# Patient Record
Sex: FEMALE | Race: BLACK OR AFRICAN AMERICAN | NOT HISPANIC OR LATINO | Employment: PART TIME | URBAN - METROPOLITAN AREA
[De-identification: names, ages, dates, MRNs, and addresses within clinical notes are randomized per-mention and may not be internally consistent; named-entity substitution may affect disease eponyms.]

---
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
## 2023-02-11 ENCOUNTER — HEALTH MAINTENANCE LETTER (OUTPATIENT)
Age: 18
End: 2023-02-11

## 2023-08-11 ENCOUNTER — OFFICE VISIT (OUTPATIENT)
Dept: MIDWIFE SERVICES | Facility: CLINIC | Age: 18
End: 2023-08-11
Payer: COMMERCIAL

## 2023-08-11 VITALS
DIASTOLIC BLOOD PRESSURE: 74 MMHG | HEIGHT: 69 IN | BODY MASS INDEX: 27.95 KG/M2 | SYSTOLIC BLOOD PRESSURE: 117 MMHG | WEIGHT: 188.7 LBS | HEART RATE: 87 BPM | OXYGEN SATURATION: 100 % | TEMPERATURE: 97.9 F

## 2023-08-11 DIAGNOSIS — A54.9 GONORRHEA: Primary | ICD-10-CM

## 2023-08-11 DIAGNOSIS — B96.89 BACTERIAL VAGINOSIS: ICD-10-CM

## 2023-08-11 DIAGNOSIS — N89.8 VAGINAL DISCHARGE: ICD-10-CM

## 2023-08-11 DIAGNOSIS — Z11.3 ROUTINE SCREENING FOR STI (SEXUALLY TRANSMITTED INFECTION): ICD-10-CM

## 2023-08-11 DIAGNOSIS — N76.0 BACTERIAL VAGINOSIS: ICD-10-CM

## 2023-08-11 DIAGNOSIS — A59.9 TRICHIMONIASIS: ICD-10-CM

## 2023-08-11 LAB
CLUE CELLS: PRESENT
TRICHOMONAS, WET PREP: PRESENT
WBC'S/HIGH POWER FIELD, WET PREP: ABNORMAL
YEAST, WET PREP: ABNORMAL

## 2023-08-11 PROCEDURE — 87591 N.GONORRHOEAE DNA AMP PROB: CPT | Performed by: ADVANCED PRACTICE MIDWIFE

## 2023-08-11 PROCEDURE — 99204 OFFICE O/P NEW MOD 45 MIN: CPT | Performed by: ADVANCED PRACTICE MIDWIFE

## 2023-08-11 PROCEDURE — 87210 SMEAR WET MOUNT SALINE/INK: CPT | Performed by: ADVANCED PRACTICE MIDWIFE

## 2023-08-11 PROCEDURE — 87491 CHLMYD TRACH DNA AMP PROBE: CPT | Performed by: ADVANCED PRACTICE MIDWIFE

## 2023-08-11 NOTE — PROGRESS NOTES
"SUBJECTIVE:  Berta ia a 18 year old  who presents to clinic today for STI testing. She has recently started to have vaginal symptoms such as extra discharge, feeling that her PH is off, and a different smell. She has had similar symptoms in the past, and had chlamydia with those symptoms. She is requesting vaginal swab STI testing. She has sex infrequently with a male partner. This partner is also having sex with others.     OBJECTIVE:  /74 (BP Location: Left arm, Patient Position: Sitting, Cuff Size: Adult Regular)   Pulse 87   Temp 97.9  F (36.6  C) (Temporal)   Ht 1.753 m (5' 9\")   Wt 85.6 kg (188 lb 11.2 oz)   LMP 2023 (Exact Date)   SpO2 100%   Breastfeeding No   BMI 27.87 kg/m    Not otherwise examined today.    ASSESSMENT/PLAN:  (A54.9) Gonorrhea  (primary encounter diagnosis)  Plan: cefTRIAXone (ROCEPHIN) in lidocaine 1% (PF) for        IM administration only 500 mg    (Z11.3) Routine screening for STI (sexually transmitted infection)  Plan: NEISSERIA GONORRHOEA PCR, CHLAMYDIA TRACHOMATIS        PCR, Wet prep - Clinic Collect    (N89.8) Vaginal discharge  Plan: NEISSERIA GONORRHOEA PCR, CHLAMYDIA TRACHOMATIS        PCR, Wet prep - Clinic Collect    (N76.0,  B96.89) Bacterial vaginosis  Plan: metroNIDAZOLE (FLAGYL) 500 MG tablet  (A59.9) Trichimoniasis  Plan: metroNIDAZOLE (FLAGYL) 500 MG tablet        Discussed recommendations for full panel STI testing including blood testing due to her history of an STI in the past as well as having sex with a high risk individual. Berta declines blood testing at this time.     Yara ZULETA    I was present with the RODM student who participated in the service and in the documentation of the services provided. I have verified the history and personally performed the physical exam and medical decision making, as documented by the student and edited by me.     Evonne Fox, CAROLA SCHILLING    "

## 2023-08-12 LAB
C TRACH DNA SPEC QL NAA+PROBE: NEGATIVE
N GONORRHOEA DNA SPEC QL NAA+PROBE: POSITIVE

## 2023-08-14 RX ORDER — CEFTRIAXONE SODIUM 1 G
500 VIAL (EA) INJECTION ONCE
Status: COMPLETED | OUTPATIENT
Start: 2023-08-14 | End: 2023-08-15

## 2023-08-14 RX ORDER — METRONIDAZOLE 500 MG/1
500 TABLET ORAL 2 TIMES DAILY
Qty: 14 TABLET | Refills: 0 | Status: SHIPPED | OUTPATIENT
Start: 2023-08-14 | End: 2023-08-21

## 2023-08-15 ENCOUNTER — ALLIED HEALTH/NURSE VISIT (OUTPATIENT)
Dept: NURSING | Facility: CLINIC | Age: 18
End: 2023-08-15
Payer: COMMERCIAL

## 2023-08-15 DIAGNOSIS — A54.9 GONORRHEA: Primary | ICD-10-CM

## 2023-08-15 PROCEDURE — 99207 PR NO CHARGE NURSE ONLY: CPT

## 2023-08-15 PROCEDURE — 96372 THER/PROPH/DIAG INJ SC/IM: CPT | Performed by: ADVANCED PRACTICE MIDWIFE

## 2023-08-15 RX ADMIN — Medication 500 MG: at 12:27

## 2023-08-24 DIAGNOSIS — L25.9 CONTACT DERMATITIS, UNSPECIFIED CONTACT DERMATITIS TYPE, UNSPECIFIED TRIGGER: ICD-10-CM

## 2023-08-24 RX ORDER — TRIAMCINOLONE ACETONIDE 1 MG/G
CREAM TOPICAL 2 TIMES DAILY
Qty: 30 G | Refills: 0 | Status: SHIPPED | OUTPATIENT
Start: 2023-08-24

## 2023-09-13 ENCOUNTER — VIRTUAL VISIT (OUTPATIENT)
Dept: PSYCHOLOGY | Facility: CLINIC | Age: 18
End: 2023-09-13
Payer: COMMERCIAL

## 2023-09-13 DIAGNOSIS — F43.23 ADJUSTMENT DISORDER WITH MIXED ANXIETY AND DEPRESSED MOOD: Primary | ICD-10-CM

## 2023-09-13 PROCEDURE — 90791 PSYCH DIAGNOSTIC EVALUATION: CPT | Mod: 95 | Performed by: SOCIAL WORKER

## 2023-09-13 ASSESSMENT — PATIENT HEALTH QUESTIONNAIRE - PHQ9
SUM OF ALL RESPONSES TO PHQ QUESTIONS 1-9: 12
10. IF YOU CHECKED OFF ANY PROBLEMS, HOW DIFFICULT HAVE THESE PROBLEMS MADE IT FOR YOU TO DO YOUR WORK, TAKE CARE OF THINGS AT HOME, OR GET ALONG WITH OTHER PEOPLE: SOMEWHAT DIFFICULT
SUM OF ALL RESPONSES TO PHQ QUESTIONS 1-9: 12

## 2023-09-13 ASSESSMENT — ANXIETY QUESTIONNAIRES
GAD7 TOTAL SCORE: 16
4. TROUBLE RELAXING: NEARLY EVERY DAY
GAD7 TOTAL SCORE: 16
2. NOT BEING ABLE TO STOP OR CONTROL WORRYING: NOT AT ALL
IF YOU CHECKED OFF ANY PROBLEMS ON THIS QUESTIONNAIRE, HOW DIFFICULT HAVE THESE PROBLEMS MADE IT FOR YOU TO DO YOUR WORK, TAKE CARE OF THINGS AT HOME, OR GET ALONG WITH OTHER PEOPLE: SOMEWHAT DIFFICULT
5. BEING SO RESTLESS THAT IT IS HARD TO SIT STILL: NEARLY EVERY DAY
1. FEELING NERVOUS, ANXIOUS, OR ON EDGE: SEVERAL DAYS
7. FEELING AFRAID AS IF SOMETHING AWFUL MIGHT HAPPEN: NEARLY EVERY DAY
6. BECOMING EASILY ANNOYED OR IRRITABLE: NEARLY EVERY DAY
3. WORRYING TOO MUCH ABOUT DIFFERENT THINGS: NEARLY EVERY DAY

## 2023-09-13 ASSESSMENT — COLUMBIA-SUICIDE SEVERITY RATING SCALE - C-SSRS
2. HAVE YOU ACTUALLY HAD ANY THOUGHTS OF KILLING YOURSELF?: NO
ATTEMPT LIFETIME: NO
1. HAVE YOU WISHED YOU WERE DEAD OR WISHED YOU COULD GO TO SLEEP AND NOT WAKE UP?: NO
6. HAVE YOU EVER DONE ANYTHING, STARTED TO DO ANYTHING, OR PREPARED TO DO ANYTHING TO END YOUR LIFE?: NO
TOTAL  NUMBER OF ABORTED OR SELF INTERRUPTED ATTEMPTS LIFETIME: NO
TOTAL  NUMBER OF INTERRUPTED ATTEMPTS LIFETIME: NO
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
## 2023-09-13 NOTE — PATIENT INSTRUCTIONS
Dear Berta,     It was nice meeting you today. Thank you for your willingness to speak with me. Today we discussed starting therapy. Before your next visit, please think about what you want to change in your life through therapy, what are your expectations for your therapy work, 2 - 3 goals you would like to accomplish in treatment, and how we will know when you've achieved them. If you struggle with this, think about this .... when you walk out of here for the last time and you have done what you came in here to do, what do you want to be different? What would you do more of or less of? What would you start doing or stop doing? What might you think differently about?    My therapy sessions are generally 45 minutes long. Please call [phone] to make, change or cancel appointments. Please allow 24 hours notice if you need to cancel or change your visit.     I am available for in-person care on Wednesdays at [facility], [facility] [address], [address] [place] [address]. The office is adjacent to the [employer] shop and across from the [facility].     I am recommending the following:  Call to schedule an appointment in primary care to address breathing symptoms and establish with primary care provider.     Abstain from alcohol and all mood-altering substances. While it can have a temporary positive impact on our mood, it is a depressant and will likely lead to lower mood and anxiety. It also negatively impacts our sleep.    Individual therapy for added support, processing feelings, identifying and developing additional coping skills, and learning and implementing healthy ways of managing stress and emotions.     Daily self care. Be intentional about taking time to engage in an activity that feels good each day.     Sleep, nutrition, exercise, and social support. These areas are important for good mental health.     Crisis  Lines  Crisis Text Line  Text 600501  You will be connected with a trained live crisis counselor to provide support.    [employer] Hotline  5.025.523.6342 [hope]    línea de crisis española  689.346.5029    [facility] [employer] Helpline  711.795.8361    National Hope Line  3.072.773.1870 [hope]    National Suicide Prevention Lifeline  Free and confidential support  988 or 1.520.226.TALK [8255]  http://suicidepreventionlifeline.org    The [place] Project (LGBTQ Youth Crisis Line)  4.874.799.6912  text START to 550-538    's Crisis Line  0.600.471.5183 (Press 1)  or text 899285    [facility] Mental Health Crisis Response  Within [place], call **CRISIS [**899582] to be connected to a mental health professional who can assist you.    [facility] Crisis  [phone]  [facility] Mobile Crisis  [phone]  [facility] Crisis  [phone]  [facility] Mobile Crisis  [phone] (adults)  [phone] (children)  [facility] Mobile Crisis  [phone] (adults)  [phone] (children)  [facility] Mobile Crisis  816.156.8278  [facility] Mobile Crisis  008.906.5055    Community Resources  Fast Tracker  Linking people to mental health and substance use disorder resources  fasttrackermn.org     [place] Mental Health Warmline  Peer to peer support  5 pm to 9 am 7 days/week  5.513.314.9708  https://mnwitw.org/samuel    National [place] on Mental Illness (DION)  872.966.9654 or 1.888.DION.HELPS  https://namimn.org/    [facility]  [facility] residents [address]  [phone]    [facility]  Free mental health counseling  https://walkin.org/  612.870.0565 X2    Mental Health Apps  Calm Harm  https://calmharm.co.uk/  My3  https://my3app.org/  [place] Safety Plan  https://www.[place]afetyplan.org/  VirtualHopeBox

## 2023-09-19 ENCOUNTER — VIRTUAL VISIT (OUTPATIENT)
Dept: PSYCHOLOGY | Facility: CLINIC | Age: 18
End: 2023-09-19
Payer: COMMERCIAL

## 2023-09-19 DIAGNOSIS — F43.23 ADJUSTMENT DISORDER WITH MIXED ANXIETY AND DEPRESSED MOOD: Primary | ICD-10-CM

## 2023-09-19 PROCEDURE — 90834 PSYTX W PT 45 MINUTES: CPT | Mod: VID | Performed by: SOCIAL WORKER

## 2023-09-19 NOTE — PROGRESS NOTES
"  [facility]                                   Progress Note    Patient Name: Berta Oseguera  Date: 9/19/2023        Service Type: Individual  Attendees:  Patient attended alone      Session Start Time: 0720  Session End Time: 0810     Session Length: 38-52 min  Session #: 1    Service Modality:  Phone Visit:      Provider verified identity through the following two step process.  Patient provided:  Patient is known previously to provider and Patient was verified at admission/transfer    Telephone Visit: The patient's condition can be safely assessed and treated via synchronous audio telemedicine encounter.      Reason for Audio Telemedicine Visit: Patient has requested telehealth visit and Patient convenience (e.g. access to timely appointments / distance to available provider)    Originating Site (Patient Location): Patient's home    Distant Site (Provider Location): Provider Remote Setting- Home Office    Consent:  The patient/guardian has verbally consented to:     1. The potential risks and benefits of telemedicine (telephone visit) versus in person care;    The patient has been notified of the following:      \"We have found that certain health care needs can be provided without the need for a face to face visit.  This service lets us provide the care you need with a phone conversation.       I will have full access to your [facility] medical record during this entire phone call.   I will be taking notes for your medical record.      Since this is like an office visit, we will bill your insurance company for this service.       There are potential benefits and risks of telephone visits (e.g. limits to patient confidentiality) that differ from in-person visits.?Confidentiality still applies for telephone services, and nobody will record the visit.  It is important to be in a quiet, private space that is free of distractions (including cell phone or other devices) during the visit.??    " "  If during the course of the call I believe a telephone visit is not appropriate, you will not be charged for this service\"     Consent has been obtained for this service by care team member: Yes     DATA  Interactive Complexity: No  Crisis: No      Progress Since Last Session (related to symptoms/goals/homework):   Symptoms: No change - denies significant change to sx    Homework: Completed in session     Episode of Care Goals: Satisfactory progress - PREPARATION (Decided to change - considering how); Intervened by negotiating a change plan and determining options / strategies for behavior change, identifying triggers, exploring social supports, and working towards setting a date to begin behavior change    Current/Ongoing Stressors and Concerns: independent, social, strong family support, financial stress, trauma hx, recently quit marijuana (9/2023)     Treatment Objective(s) Addressed in This Session: Rapport building, treatment planning    Intervention: Did not arrive to scheduled appointment. Called patient who had overslept and was amenable to meeting by phone. There were a few instances where she was distracted, as she was moving her parked vehicle to the garage and had issues unlocking a door. Presents with euthymic mood, anxious affect; open, engaged and responsive to interventions. States that she continues to abstain from marijuana, though continues vaping, which she states she doesn't believe is healthy for her. Denies significant change to symptoms or stressors. Identifies preference to address treatment planning, then to address interpersonal conflict and stress. Engaged in treatment planning. Identifies the treatment goals related to depression, anxiety and self-esteem. Completed the treatment plan. Expresses interest in meeting twice weekly, though amenable to once/week to start. Worked on strengthening the relationship through the implementation of collaborative approach, agreement on goals, " demonstration of empathy, verbalization of positive regard, and collection of feedback.      Assessments completed prior to visit:  PHQ9:       6/21/2022    10:05 PM 9/13/2023    10:54 AM   PHQ-9 SCORE   PHQ-9 Total Score MyChart 19 (Moderately severe depression) 12 (Moderate depression)   PHQ-9 Total Score 19 12     GAD7:       9/13/2023    10:56 AM   YG-7 SCORE   Total Score 16 (severe anxiety)   Total Score 16       ASSESSMENT: Current Emotional/Mental Status (status of significant symptoms):   Risk status (Self/Other harm or suicidal ideation)   Patient denies current fears or concerns for personal safety.   Patient denies current or recent suicidal ideation or behaviors.   Patient denies current or recent homicidal ideation or behaviors.   Patient denies current or recent self injurious behavior or ideation.   Patient denies other safety concerns.   Patient reports there has been no change in risk factors since their last session.     Patient reports there has been no change in protective factors since their last session.     Recommended that patient call 911 or go to the local ED should there be a change in any of these risk factors.     Appearance:   Unable to assess    Eye Contact:   Unable to assess    Psychomotor Behavior: Unable to assess    Attitude:   Cooperative  Pleasant   Orientation:   All   Speech    Rate / Production: Normal/ Responsive    Volume:  Soft    Mood:    Anxious  Normal Euthymic   Affect:    Appropriate    Thought Content:  Clear    Thought Form:  Coherent  Logical    Insight:    Fair      Medication Review: No current psychiatric medications prescribed     Medication Compliance: NA     Changes in Health Issues: None reported     Chemical Use Review:   Substance Use: Chemical use reviewed, no active concerns identified    Quit marijuana September 2023; hx daily use.     Tobacco Use: No change in amount of tobacco use since last session.  Patient declined discussion at this  "time    Diagnosis:  1. Adjustment disorder with mixed anxiety and depressed mood      Collateral Reports Completed: Not Applicable    PLAN: Personal goals for 2023    Matters to address at future session(s): How to handle situations with people trying to talk to you.     Next appt(s): 9/27, 10/2, 10/9  Prefers Mondays/Wednesdays ([facility]).    Waitlist: 9/20, 9/25 April ALMAS Mendenhall, LICSW  9/19/2023                                                   ______________________________________________________________________    Individual Treatment Plan    Patient's Name: Berta Oseguera  YOB: 2005    Date of Creation: 9/19/2023   Date Treatment Plan Last Reviewed/Revised: 9/19/2023; will next review 12/18/23     DSM5 Diagnoses: Adjustment Disorders  309.28 (F43.23) With mixed anxiety and depressed mood  Psychosocial/Contextual Factors: independent, social, strong family support, financial stress, trauma hx, recently quit marijuana (9/2023)  PROMIS (reviewed every 90 days): PROMIS-10 Scores      9/13/2023    11:11 AM   PROMIS-10 Total Score w/o Sub Scores   PROMIS TOTAL - SUBSCORES 20      Referral/Collaboration: Referral to another professional/service is not indicated at this time.    Anticipated number of session for this episode of care: 9-12 sessions  Anticipation frequency of session: Weekly  Anticipated Duration of each session: 38-52 minutes  Treatment plan will be reviewed in 90 days or when goals have been changed.     Measurable Treatment Goal(s) related to diagnosis/functional impairment(s)    \"To gain self-love and self-confidence again, talk about a lot of stuff that's been bothering me, to build a bonding relationship with you [therapist].\"     Goal 1: Patient will experience decrease in depressive symptoms as evidenced by PHQ-9 scores.     I will know I've met my goal when I have better conversations about my life and what I'm doing, and am less sad and irritable.     Objective " A    Patient will verbalize an accurate understanding of depression.  Status: New - Date: 9/19/23    Intervention(s): Therapist will, consistent with the treatment model, discuss how cognitive, behavioral, interpersonal, and/or other factors (e.g. family history) contribute to depression.     Objective B  Patient will verbalize an understanding of the rationale for treatment of depression.  Status: New - Date: 9/19/23    Intervention(s): Therapist will, consistent with the treatment model, discuss how change in cognitive, behavioral, interpersonal, and/or other factors can help alleviate depression and return to previous level of effective functioning.     Objective C    Patient will identify and replace thoughts and beliefs that support depression.  Status: New - Date: 9/19/23    Intervention(s): Therapist will conduct cognitive-behavioral therapy, first conveying the connection among cognition, depressive feelings, and actions. Assign the patient to self-monitor thoughts, feelings, and actions in a journal; process the journal material to identify, challenge, and change depressive thinking patterns and replace them with reality-based thoughts. Identify, challenge, and change depressive thinking patterns and replace them with reality-based thoughts. Assign  behavioral experiments  in and outside of sessions that test depressive automatic thoughts and beliefs against more reality-based ones toward a sustained shift reflecting hopefulness, motivation, confidence, and an improved self-concept. Facilitate and reinforce the patient's shift from biased depressive self-talk and beliefs to reality-based alternatives that enhance emotion regulation and increase adaptive functioning. Explore and restructure underlying assumptions and schema reflected in biased self-talk that may place the patient at risk for relapse or recurrence; help build the patient's self-concept from unlovable, worthless, helpless, or incompetent to  empowering alternatives.    Objective D  Patient will learn and implement behavioral strategies to overcome depression.  Status: New - Date: 9/19/23     Intervention(s): Therapist will engage the patient in  behavioral activation,  increasing his/her/their activity level and contact with sources of reward, while identifying processes that inhibit or obstruct activation; use instruction, rehearsal, role-playing, role reversal, as needed, to facilitate activity in the patient's daily life; reinforce success, problem-solve obstacles. Assist the patient in developing skills that increase the likelihood of deriving pleasure and meaning from behavioral activation (e.g., assertiveness skills, developing an exercise plan, less internal/more external focus, increased social involvement); reinforce success; problem-solve obstacles toward sustained, rewarding activation.    Objective E  Patient will increasingly verbalize hopeful and positive statements regarding self, others, and the future.  Status: New - Date: 9/19/23    Intervention(s): Therapist will teach more about depression and how to recognize and accept some sadness as a normal variation in feeling. Assign the patient to write positive affirmation statements regarding themselves and the future.      Objective F  Patient will learn and implement problem-solving and decision-making skills.                     Status: New - Date: 9/19/23     Intervention(s): Therapist will conduct problem-solving therapy using techniques such as psychoeducation, modeling, and role-playing to teach patient problem-solving skills (i.e., defining a problem specifically, generating possible solutions, evaluating the pros and cons of each solution, selecting and implementing a plan of action, evaluating the efficacy of the plan, accepting or revising the plan); accepting or revising the plan); role-play application of the problem-solving skill to a real life issue. Encourage the development  of a positive problem orientation in which problems and solving them are viewed as a natural part of life and not something to be feared, despaired, or avoided; reinforce successes toward sustained, effective use.    Objective G    Patient will verbalize an understanding of healthy and unhealthy emotions with the intent of increasing the use of healthy emotions to guide actions.  Status: New - Date: 9/19/23    Intervention(s): Therapist will use a process-experiential approach consistent with emotion-focused therapy to create a safe, nurturing environment in which the patient can process emotions, learning to identify and regulate unhealthy feelings and to generate more adaptive ones that then guide actions.     Goal 2: Patient will experience decrease in anxiety symptoms as evidenced by YG-7 scores.    I will know I've met my goal when I am spending more time doing things just for myself, like being outside or going for walks, and doing things, not just sitting with friends looking at social media.      Objective A    Patient will verbalize an understanding of the cognitive, physiological, and behavioral components of anxiety and its treatment.  Status: New - Date: 9/19/23    Intervention(s): Therapist will discuss how anxiety typically involves excessive worry about unrealistically appraised threats, various bodily expressions of overarousal, hypervigilance, and avoidance of what is threatening that interact to maintain the problem. Discuss how treatment targets worry, anxiety symptoms, and avoidance to help the patient manage worry effectively, reduce overarousal, eliminate unnecessary avoidance, and reengage in rewarding activities.    Objective B  Patient will verbalize an understanding of the role that thinking plays in worry, anxiety, and avoidance.  Status: New - Date: 9/19/23    Intervention(s): Therapist will discuss examples demonstrating how unproductive worry typically overestimates the probability of  threats and underestimates or overlooks the patient's ability to manage realistic demands. Assist the patient in analyzing worries by examining potential biases such as the probability of the negative expectation occurring, the real consequences of it occurring, ability to control the outcome, the worst possible outcome, and ability to accept it. Help the patient gain insight into the notion that worry creates acute and/or chronic anxious apprehension, leading to avoidance that precludes finding solutions to problems.    Objective C  Patient will identify, challenge, and replace biased, fearful self-talk with positive, realistic, and empowering self-talk.  Status: New - Date: 9/19/23    Intervention(s): Therapist will utilize techniques from cognitive behavioral therapies including intolerance of uncertainty and metacognitive therapies explore the patient's self-talk, underlying assumptions, schema, or metacognition that mediate anxiety; assist the patient in challenging and changing biases; conduct behavioral experiments to test biased versus unbiased predictions toward dispelling unproductive worry and increasing self-confidence in addressing the subject of worry. Assign homework exercises to identify fearful self-talk, identify biases in the self-talk, generate alternatives, and test them through behavioral experiments; review and reinforce success, providing corrective feedback toward improvement.    Objective D  Patient will learn and implement calming skills to reduce overall anxiety and manage anxiety.  Status: New - Date: 9/19/23    Intervention(s): Therapist will teach calming/relaxation/mindfulness skills (e.g., applied relaxation, progressive muscle relaxation, cue controlled relaxation; mindful breathing; biofeedback) and how to discriminate better between relaxation and tension; teach the patient how to apply these skills to daily life. Assign homework in which he/she/they practice  calming/relaxation/mindfulness skills, gradually applying them progressively from non-anxiety-provoking to anxiety-provoking situations; review and reinforce success; resolve obstacles toward sustained implementation.    Objective E  Patient will learn and implement problem-solving strategies for realistically addressing worries.  Status: New - Date: 9/19/23    Intervention(s): Therapist will teach problem-solving/solution-finding strategies to replace unproductive worry involving specifically defining a problem, generating options for addressing it, evaluating the pros and cons of each option, selecting and implementing an action plan, and reevaluating and refining the action.    Goal 3: Patient will establish an inward sense of self-worth, confidence, and competence.     I will know I've met my goal when I am wearing something other than sweats outside of work.       Objective A  Patient will increase insight into the historical and current sources of low self-esteem.  Status: New - Date: 9/19/23    Intervention(s): Therapist will help patient become aware of fear of rejection and its connection with past rejection or abandonment experiences; begin to contrast past experiences of pain with present experiences of acceptance and competence. Discuss, emphasize, and interpret the patient's incidents of abuse and how they have affected feelings about self.    Objective B  Patient will decrease the verbalized fear of rejection while increasing statements of self-acceptance.  Status: New - Date: 9/19/23    Intervention(s): Therapist will assign the patient to write positive affirmation statements regarding themselves and the future. Verbally reinforce the patient s use of positive statements of confidence and accomplishments.    Objective C  Patient will identify positive traits and talents about self.  Status: New - Date: 9/19/23    Intervention(s): Therapist will assign patient the exercise of identifying his/her/their  positive physical characteristics in a mirror to help him/her/them become more comfortable with himself/herself/themselves. Ask the patient to keep building a list of positive traits and have him/her/them read the list at the beginning and end of each session     Objective D  Patient will identify and replace negative self-talk messages used to reinforce low self-confidence.  Status: New - Date: 9/19/23     Intervention(s): Therapist will help the patient identify distorted, negative beliefs about self and the world and replace these messages with more realistic, affirmative messages. Ask the patient to complete and process self-esteem-building exercises from recommended self-help books (e.g., Ten Days to Self Esteem by Maeve; The Self-Esteem  by Haroon Haines [place], and Ayad; 10 Simple Solutions for Building Self-Esteem by Mary Jane).     Objective E  Patient will identify and engage in activities that would improve self-image by being consistent with one s values.  Status: New - Date: 9/19/23    Intervention(s): Therapist will help patient analyze his/her/their values and the congruence or incongruence between them and the patient s daily activities. Identify and assign activities congruent with the patient s values; process them toward improving self-concept and self-esteem.    Objective F  Patient will decrease the frequency of negative self-descriptive statements and increase frequency of positive self-descriptive statements.  Status: New - Date: 9/19/23    Intervention(s): Therapist will assist the patient in becoming aware of how he/she/they express or act out negative self-feelings. Help patient reframe his/her/their negative self-assessment. Assist in developing positive self-talk as a way of boosting confidence and self-image.    Objective G    Patient will demonstrate an increased ability to identify and express personal feelings.  Status: New - Date: 9/19/23    Intervention(s):  Therapist will assist patient in identifying and labeling emotions.    Objective H  Patient will articulate a plan to be proactive in trying to get identified needs met.  Status: New - Date: 9/19/23    Intervention(s): Therapist will assist the patient in identifying and verbalizing needs, met and unmet. Assist the patient in developing a specific action plan to get each need met.    Objective I    Patient will positively acknowledge verbal compliments from others.  Status: New - Date: 9/19/23    Intervention(s): Therapist will assign patient to be aware of and acknowledge graciously (without discounting) praise and compliments from others.    Objective J  Patient will take verbal responsibility for accomplishments without discounting.  Status: New - Date: 9/19/23    Intervention(s): Therapist will ask patient list accomplishments; process the integration of these into his/her/their self-image.    Patient has reviewed and agreed to the above plan.    Loree Mendenhall, [facility]  September 19, 2023

## 2023-09-27 ENCOUNTER — VIRTUAL VISIT (OUTPATIENT)
Dept: PSYCHOLOGY | Facility: CLINIC | Age: 18
End: 2023-09-27
Payer: COMMERCIAL

## 2023-09-27 DIAGNOSIS — F43.23 ADJUSTMENT DISORDER WITH MIXED ANXIETY AND DEPRESSED MOOD: Primary | ICD-10-CM

## 2023-09-27 PROCEDURE — 90834 PSYTX W PT 45 MINUTES: CPT | Mod: 95 | Performed by: SOCIAL WORKER

## 2023-09-27 ASSESSMENT — ANXIETY QUESTIONNAIRES
GAD7 TOTAL SCORE: 20
3. WORRYING TOO MUCH ABOUT DIFFERENT THINGS: NEARLY EVERY DAY
4. TROUBLE RELAXING: NEARLY EVERY DAY
GAD7 TOTAL SCORE: 20
6. BECOMING EASILY ANNOYED OR IRRITABLE: NEARLY EVERY DAY
2. NOT BEING ABLE TO STOP OR CONTROL WORRYING: NEARLY EVERY DAY
1. FEELING NERVOUS, ANXIOUS, OR ON EDGE: MORE THAN HALF THE DAYS
7. FEELING AFRAID AS IF SOMETHING AWFUL MIGHT HAPPEN: NEARLY EVERY DAY
IF YOU CHECKED OFF ANY PROBLEMS ON THIS QUESTIONNAIRE, HOW DIFFICULT HAVE THESE PROBLEMS MADE IT FOR YOU TO DO YOUR WORK, TAKE CARE OF THINGS AT HOME, OR GET ALONG WITH OTHER PEOPLE: EXTREMELY DIFFICULT
5. BEING SO RESTLESS THAT IT IS HARD TO SIT STILL: NEARLY EVERY DAY

## 2023-09-27 ASSESSMENT — PATIENT HEALTH QUESTIONNAIRE - PHQ9
SUM OF ALL RESPONSES TO PHQ QUESTIONS 1-9: 13
SUM OF ALL RESPONSES TO PHQ QUESTIONS 1-9: 13
10. IF YOU CHECKED OFF ANY PROBLEMS, HOW DIFFICULT HAVE THESE PROBLEMS MADE IT FOR YOU TO DO YOUR WORK, TAKE CARE OF THINGS AT HOME, OR GET ALONG WITH OTHER PEOPLE: VERY DIFFICULT

## 2023-09-27 NOTE — TELEPHONE ENCOUNTER
"Routing refill request to provider for review/approval because:  A break in medication    Last Written Prescription Date:  12/15/22  Last Fill Quantity: 30g ,  # refills: 0   Last office visit provider:  12/15/22    Requested Prescriptions   Pending Prescriptions Disp Refills    triamcinolone (KENALOG) 0.1 % external cream [Pharmacy Med Name: TRIAMCINOLONE 0.1% CREAM   30GM] 30 g 0     Sig: APPLY TOPICALLY TO THE AFFECTED AREA TWICE DAILY       Topical Steroids and Nonsteroidals Protocol Passed - 8/24/2023 12:16 PM        Passed - Patient is age 6 or older        Passed - Authorizing prescriber's most recent note related to this medication read.     If refill request is for ophthalmic use, please forward request to provider for approval.          Passed - High potency steroid not ordered        Passed - Recent (12 mo) or future (30 days) visit within the authorizing provider's specialty     Patient has had an office visit with the authorizing provider or a provider within the authorizing providers department within the previous 12 mos or has a future within next 30 days. See \"Patient Info\" tab in inbasket, or \"Choose Columns\" in Meds & Orders section of the refill encounter.              Passed - Medication is active on med list             Jannette Thorpe RN 08/24/23 3:09 PM  "
36.5
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
## 2023-10-02 ENCOUNTER — VIRTUAL VISIT (OUTPATIENT)
Dept: PSYCHOLOGY | Facility: CLINIC | Age: 18
End: 2023-10-02
Payer: COMMERCIAL

## 2023-10-02 DIAGNOSIS — F43.23 ADJUSTMENT DISORDER WITH MIXED ANXIETY AND DEPRESSED MOOD: Primary | ICD-10-CM

## 2023-10-02 PROCEDURE — 90837 PSYTX W PT 60 MINUTES: CPT | Mod: 95 | Performed by: SOCIAL WORKER
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
## 2023-10-06 ENCOUNTER — OFFICE VISIT (OUTPATIENT)
Dept: MIDWIFE SERVICES | Facility: CLINIC | Age: 18
End: 2023-10-06

## 2023-10-06 VITALS
DIASTOLIC BLOOD PRESSURE: 80 MMHG | BODY MASS INDEX: 28.05 KG/M2 | HEIGHT: 69 IN | WEIGHT: 189.4 LBS | SYSTOLIC BLOOD PRESSURE: 130 MMHG | TEMPERATURE: 97.8 F | HEART RATE: 80 BPM

## 2023-10-06 DIAGNOSIS — R30.0 DYSURIA: ICD-10-CM

## 2023-10-06 DIAGNOSIS — A59.01 TRICHOMONAL VAGINITIS: Primary | ICD-10-CM

## 2023-10-06 DIAGNOSIS — N89.8 VAGINAL ITCHING: ICD-10-CM

## 2023-10-06 DIAGNOSIS — N89.8 VAGINAL DISCHARGE: ICD-10-CM

## 2023-10-06 LAB
CLUE CELLS: ABNORMAL
TRICHOMONAS, WET PREP: PRESENT
WBC'S/HIGH POWER FIELD, WET PREP: ABNORMAL
YEAST, WET PREP: ABNORMAL

## 2023-10-06 PROCEDURE — 87591 N.GONORRHOEAE DNA AMP PROB: CPT | Performed by: ADVANCED PRACTICE MIDWIFE

## 2023-10-06 PROCEDURE — 99214 OFFICE O/P EST MOD 30 MIN: CPT | Performed by: ADVANCED PRACTICE MIDWIFE

## 2023-10-06 PROCEDURE — 87491 CHLMYD TRACH DNA AMP PROBE: CPT | Performed by: ADVANCED PRACTICE MIDWIFE

## 2023-10-06 PROCEDURE — 87210 SMEAR WET MOUNT SALINE/INK: CPT | Performed by: ADVANCED PRACTICE MIDWIFE

## 2023-10-06 RX ORDER — METRONIDAZOLE 500 MG/1
500 TABLET ORAL 2 TIMES DAILY
Qty: 14 TABLET | Refills: 0 | Status: SHIPPED | OUTPATIENT
Start: 2023-10-06 | End: 2023-10-13

## 2023-10-06 NOTE — RESULT ENCOUNTER NOTE
Dear Berta,    Your test results are attached below. You still have an infection with trichomonas bacteria. I prescribed you antibiotics to treat the infection to your [facility] pharmacy. You should take the full course of antibiotics and this should clear up your symptoms. If you would like you can return to the clinic a month after treatment in order to repeat the testing to make sure you cleared the infection. I can also prescribed expedited partner treatment- a course of antibiotics for your sexual partner if you wish.  If you have any questions, please contact me via [employer] or you can call our office at [phone].    Floridalma Black, SHAKIR, APRN SHAKIR, CNM

## 2023-10-06 NOTE — PROGRESS NOTES
"S: Berta presents to clinic with concerns of changes in vaginal discharge and would like to be re-tested. Appointment scheduled for UTI symptoms, she declines urinary symptoms but would still like to test urine today to confirm she does not have infection. Positive Trichomonas, BV, and GC on 8/11/23. She reports symptoms improved after tx. She is no longer with same partner, but did have intercourse with him after treatment. She reports continued yellow discharge with itching. She reports using OTC vaginal pH soap. Reviewed other cleansing methods including avoiding harsh soaps and cleansing with water only.   O: /80   Pulse 80   Temp 97.8  F (36.6  C)   Ht 1.753 m (5' 9\")   Wt 85.9 kg (189 lb 6.4 oz)   LMP 10/01/2023 (Exact Date)   BMI 27.97 kg/m    A/P:   (N89.8) Vaginal itching  (primary encounter diagnosis)  Comment:   Plan: CANCELED: UA with Microscopic reflex to Culture    (N89.8) Vaginal discharge  Comment:   Plan: NEISSERIA GONORRHOEA PCR, CHLAMYDIA TRACHOMATIS        PCR, Wet prep - Clinic Collect    (R30.0) Dysuria  Comment:   Plan: UA with Microscopic reflex to Culture - lab         collect        (A59.01) Trichomonal vaginitis  Comment:   Plan: metroNIDAZOLE (FLAGYL) 500 MG tablet      MARSHA Casper    I was present with the SHAKIR student who participated in the service and in the documentation of the services provided. I have verified the history and personally performed the physical exam and medical decision making, as documented by the student and edited by me.     Floridalma Lama CNM, APRN SHAKIR SCHILLING      "

## 2023-10-07 LAB
C TRACH DNA SPEC QL NAA+PROBE: NEGATIVE
N GONORRHOEA DNA SPEC QL NAA+PROBE: NEGATIVE

## 2023-10-07 NOTE — RESULT ENCOUNTER NOTE
Dear Berta,    Your test results are attached below. Great news. Your sti tests results were negative.  If you have any questions, please contact me via [facility] or you can call our office at [phone].    Floridalma Black CNM, APRN SHAKIR, CNM

## 2023-10-09 ENCOUNTER — VIRTUAL VISIT (OUTPATIENT)
Dept: PSYCHOLOGY | Facility: CLINIC | Age: 18
End: 2023-10-09
Payer: COMMERCIAL

## 2023-10-09 DIAGNOSIS — F43.23 ADJUSTMENT DISORDER WITH MIXED ANXIETY AND DEPRESSED MOOD: Primary | ICD-10-CM

## 2023-10-09 PROCEDURE — 90837 PSYTX W PT 60 MINUTES: CPT | Mod: VID | Performed by: SOCIAL WORKER
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
## 2023-10-18 ENCOUNTER — VIRTUAL VISIT (OUTPATIENT)
Dept: PSYCHOLOGY | Facility: CLINIC | Age: 18
End: 2023-10-18
Payer: COMMERCIAL

## 2023-10-18 DIAGNOSIS — F43.23 ADJUSTMENT DISORDER WITH MIXED ANXIETY AND DEPRESSED MOOD: Primary | ICD-10-CM

## 2023-10-18 PROCEDURE — 90834 PSYTX W PT 45 MINUTES: CPT | Mod: VID | Performed by: SOCIAL WORKER

## 2023-10-18 ASSESSMENT — ANXIETY QUESTIONNAIRES
7. FEELING AFRAID AS IF SOMETHING AWFUL MIGHT HAPPEN: SEVERAL DAYS
GAD7 TOTAL SCORE: 6
GAD7 TOTAL SCORE: 6
3. WORRYING TOO MUCH ABOUT DIFFERENT THINGS: SEVERAL DAYS
IF YOU CHECKED OFF ANY PROBLEMS ON THIS QUESTIONNAIRE, HOW DIFFICULT HAVE THESE PROBLEMS MADE IT FOR YOU TO DO YOUR WORK, TAKE CARE OF THINGS AT HOME, OR GET ALONG WITH OTHER PEOPLE: VERY DIFFICULT
2. NOT BEING ABLE TO STOP OR CONTROL WORRYING: SEVERAL DAYS
6. BECOMING EASILY ANNOYED OR IRRITABLE: SEVERAL DAYS
4. TROUBLE RELAXING: NOT AT ALL
1. FEELING NERVOUS, ANXIOUS, OR ON EDGE: MORE THAN HALF THE DAYS
5. BEING SO RESTLESS THAT IT IS HARD TO SIT STILL: NOT AT ALL

## 2023-10-18 ASSESSMENT — PATIENT HEALTH QUESTIONNAIRE - PHQ9
10. IF YOU CHECKED OFF ANY PROBLEMS, HOW DIFFICULT HAVE THESE PROBLEMS MADE IT FOR YOU TO DO YOUR WORK, TAKE CARE OF THINGS AT HOME, OR GET ALONG WITH OTHER PEOPLE: VERY DIFFICULT
SUM OF ALL RESPONSES TO PHQ QUESTIONS 1-9: 9
SUM OF ALL RESPONSES TO PHQ QUESTIONS 1-9: 9
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
## 2023-11-01 ENCOUNTER — VIRTUAL VISIT (OUTPATIENT)
Dept: PSYCHOLOGY | Facility: CLINIC | Age: 18
End: 2023-11-01
Payer: COMMERCIAL

## 2023-11-01 DIAGNOSIS — F43.23 ADJUSTMENT DISORDER WITH MIXED ANXIETY AND DEPRESSED MOOD: Primary | ICD-10-CM

## 2023-11-01 PROCEDURE — 90837 PSYTX W PT 60 MINUTES: CPT | Mod: VID | Performed by: SOCIAL WORKER

## 2023-11-01 NOTE — PROGRESS NOTES
"  [facility]                                   Progress Note    Patient Name: Berta Oseguera  Date: 11/1/2023          Service Type: Individual  Attendees:  Patient attended alone      Session Start Time: 1009  Session End Time: 1115     Session Length: 53+ min  Session #: 6    Service Modality:  Phone Visit:      Provider verified identity through the following two step process.  Patient provided:  Patient is known previously to provider and Patient was verified at admission/transfer    Telephone Visit: The patient's condition can be safely assessed and treated via synchronous audio telemedicine encounter.      Reason for Audio Telemedicine Visit: Patient has requested telehealth visit and Patient convenience (e.g. access to timely appointments / distance to available provider)    Originating Site (Patient Location): Patient's home    Distant Site (Provider Location): [facility]    Consent:  The patient/guardian has verbally consented to:     1. The potential risks and benefits of telemedicine (telephone visit) versus in person care;    The patient has been notified of the following:      \"We have found that certain health care needs can be provided without the need for a face to face visit.  This service lets us provide the care you need with a phone conversation.       I will have full access to your [facility] medical record during this entire phone call.   I will be taking notes for your medical record.      Since this is like an office visit, we will bill your insurance company for this service.       There are potential benefits and risks of telephone visits (e.g. limits to patient confidentiality) that differ from in-person visits.? Confidentiality still applies for telephone services, and nobody will record the visit.  It is important to be in a quiet, private space that is free of distractions (including cell phone or other devices) during the " "visit.??      If during the course of the call I believe a telephone visit is not appropriate, you will not be charged for this service\"     Consent has been obtained for this service by care team member: Yes     DATA  Extended Session (53+ minutes): PROLONGED SERVICE IN THE OUTPATIENT SETTING REQUIRING DIRECT (FACE-TO-FACE) PATIENT CONTACT BEYOND THE USUAL SERVICE:    - Longer session due to limited access to mental health appointments and necessity to address patient's distress / complexity  Interactive Complexity: No  Crisis: No      Progress Since Last Session (related to symptoms/goals/homework):   Symptoms: No change - denies significant change to sx    Homework: Achieved / completed to satisfaction     Episode of Care Goals: Satisfactory progress - PREPARATION (Decided to change - considering how); Intervened by negotiating a change plan and determining options / strategies for behavior change, identifying triggers, exploring social supports, and working towards setting a date to begin behavior change    Current/Ongoing Stressors and Concerns: independent, social, strong family support, financial stress, trauma hx, recently quit marijuana (9/2023)     Treatment Objective(s) Addressed in This Session:   Verbalize an accurate understanding of depression.  Verbalize an understanding of the rationale for treatment of depression.  Identify and replace thoughts and beliefs that support depression.  Increasingly verbalize hopeful and positive statements regarding self, others, and the future.  Verbalize an understanding of healthy and unhealthy emotions with the intent of increasing the use of healthy emotions to guide actions.  Verbalize an understanding of the cognitive, physiological, and behavioral components of anxiety and its treatment.  Identify, challenge, and replace biased, fearful self-talk with positive, realistic, and empowering self-talk.  Increase insight into the historical and current sources of low " self-esteem.  Decrease the verbalized fear of rejection while increasing statements of self-acceptance.  Identify positive traits and talents about self.  Identify and replace negative self-talk messages used to reinforce low self-confidence.  Identify and engage in activities that would improve self-image by being consistent with one s values.  Decrease the frequency of negative self-descriptive statements and increase frequency of positive self-descriptive statements.  Demonstrate an increased ability to identify and express personal feelings.  Articulate a plan to be proactive in trying to get identified needs met.  Positively acknowledge verbal compliments from others.  Take verbal responsibility for accomplishments without discounting.  Learn and implement:  behavioral strategies to overcome depression  problem-solving and decision-making skills  calming skills to reduce overall anxiety and manage anxiety  problem-solving strategies for realistically addressing worries    Intervention: Did not arrive to virtual appointment. Called and agreed to meet via telephone. Presents with euthymic mood, congruent affect. Endorses stable symptoms. The focus of this session was on identifying deficits and building skills in interpersonal effectiveness. Provided active listening as patient reviewed and processed interpersonal dynamics and conflict. Identified and gently confronted tendency towards all or nothing thinking, and discussed how this may be contributing to distress. Provided empathy, validation, and unconditional positive regard. Patient was openly engaged. She was responsive to the interventions offered.      Assessments completed prior to visit:  PHQ9:       6/21/2022    10:05 PM 9/13/2023    10:54 AM 9/27/2023     1:03 PM 10/18/2023     5:54 PM   PHQ-9 SCORE   PHQ-9 Total Score MyChart 19 (Moderately severe depression) 12 (Moderate depression) 13 (Moderate depression) 9 (Mild depression)   PHQ-9 Total Score 19  12 13 9     GAD7:       9/13/2023    10:56 AM 9/27/2023     1:03 PM 10/18/2023     5:55 PM   YG-7 SCORE   Total Score 16 (severe anxiety) 20 (severe anxiety) 6 (mild anxiety)   Total Score 16 20 6       ASSESSMENT: Current Emotional/Mental Status (status of significant symptoms):   Risk status (Self/Other harm or suicidal ideation)   Patient denies current fears or concerns for personal safety.   Patient denies current or recent suicidal ideation or behaviors.   Patient denies current or recent homicidal ideation or behaviors.   Patient denies current or recent self injurious behavior or ideation.   Patient denies other safety concerns.   Patient reports there has been no change in risk factors since their last session.     Patient reports there has been no change in protective factors since their last session.    Recommended that patient call 911 or go to the local ED should there be a change in any of these risk factors.     Appearance:   Unable to assess    Eye Contact:   Unable to assess    Psychomotor Behavior: Unable to assess    Attitude:   Cooperative  Pleasant   Orientation:   All   Speech    Rate / Production: Normal/ Responsive    Volume:  Soft    Mood:    Anxious  Normal Euthymic   Affect:    Appropriate    Thought Content:  Clear    Thought Form:  Coherent  Logical    Insight:    Fair      Medication Review: No current psychiatric medications prescribed     Medication Compliance: NA     Changes in Health Issues: None reported     Chemical Use Review:  Substance Use: Problem use continues with no change since last session, Reviewed concerns related to health related substance abuse risk  Patient declined discussion at this time      Quit marijuana September 2023; hx daily use.  Restarted marijuana late September 2023, using socially/recreationally.    Tobacco Use: No change in amount of tobacco use since last session.  Patient declined discussion at this time    Diagnosis:  1. Adjustment disorder with  "mixed anxiety and depressed mood      Collateral Reports Completed: Not Applicable    PLAN: Self-care.     Matters to address at future session(s): trauma related to brother's episode of keny    Next appt(s): 11/20, 11/27  Waitlist week of 11/13: Mon/Wed     Prefers Mondays/Wednesdays ([facility]).    Loree Mendenhall, LICSW  11/1/2023                                                   ______________________________________________________________________    Individual Treatment Plan    Patient's Name: Berta Oseguera  YOB: 2005    Date of Creation: 9/19/2023   Date Treatment Plan Last Reviewed/Revised: 9/19/2023; will next review 12/18/23     DSM5 Diagnoses: Adjustment Disorders  309.28 (F43.23) With mixed anxiety and depressed mood  Psychosocial/Contextual Factors: independent, social, strong family support, financial stress, trauma hx, recently quit marijuana (9/2023)  PROMIS (reviewed every 90 days): PROMIS-10 Scores      9/13/2023    11:11 AM   PROMIS-10 Total Score w/o Sub Scores   PROMIS TOTAL - SUBSCORES 20      Referral/Collaboration: Referral to another professional/service is not indicated at this time.    Anticipated number of session for this episode of care: 9-12 sessions  Anticipation frequency of session: Weekly  Anticipated Duration of each session: 38-52 minutes  Treatment plan will be reviewed in 90 days or when goals have been changed.     Measurable Treatment Goal(s) related to diagnosis/functional impairment(s)    \"To gain self-love and self-confidence again, talk about a lot of stuff that's been bothering me, to build a bonding relationship with you [therapist].\"     Goal 1: Patient will experience decrease in depressive symptoms as evidenced by PHQ-9 scores.     I will know I've met my goal when I have better conversations about my life and what I'm doing, and am less sad and irritable.     Objective A    Patient will verbalize an accurate understanding of " depression.  Status: New - Date: 9/19/23    Intervention(s): Therapist will, consistent with the treatment model, discuss how cognitive, behavioral, interpersonal, and/or other factors (e.g. family history) contribute to depression.     Objective B  Patient will verbalize an understanding of the rationale for treatment of depression.  Status: New - Date: 9/19/23    Intervention(s): Therapist will, consistent with the treatment model, discuss how change in cognitive, behavioral, interpersonal, and/or other factors can help alleviate depression and return to previous level of effective functioning.     Objective C    Patient will identify and replace thoughts and beliefs that support depression.  Status: New - Date: 9/19/23    Intervention(s): Therapist will conduct cognitive-behavioral therapy, first conveying the connection among cognition, depressive feelings, and actions. Assign the patient to self-monitor thoughts, feelings, and actions in a journal; process the journal material to identify, challenge, and change depressive thinking patterns and replace them with reality-based thoughts. Identify, challenge, and change depressive thinking patterns and replace them with reality-based thoughts. Assign  behavioral experiments  in and outside of sessions that test depressive automatic thoughts and beliefs against more reality-based ones toward a sustained shift reflecting hopefulness, motivation, confidence, and an improved self-concept. Facilitate and reinforce the patient's shift from biased depressive self-talk and beliefs to reality-based alternatives that enhance emotion regulation and increase adaptive functioning. Explore and restructure underlying assumptions and schema reflected in biased self-talk that may place the patient at risk for relapse or recurrence; help build the patient's self-concept from unlovable, worthless, helpless, or incompetent to empowering alternatives.    Objective D  Patient will learn  and implement behavioral strategies to overcome depression.  Status: New - Date: 9/19/23     Intervention(s): Therapist will engage the patient in  behavioral activation,  increasing his/her/their activity level and contact with sources of reward, while identifying processes that inhibit or obstruct activation; use instruction, rehearsal, role-playing, role reversal, as needed, to facilitate activity in the patient's daily life; reinforce success, problem-solve obstacles. Assist the patient in developing skills that increase the likelihood of deriving pleasure and meaning from behavioral activation (e.g., assertiveness skills, developing an exercise plan, less internal/more external focus, increased social involvement); reinforce success; problem-solve obstacles toward sustained, rewarding activation.    Objective E  Patient will increasingly verbalize hopeful and positive statements regarding self, others, and the future.  Status: New - Date: 9/19/23    Intervention(s): Therapist will teach more about depression and how to recognize and accept some sadness as a normal variation in feeling. Assign the patient to write positive affirmation statements regarding themselves and the future.      Objective F  Patient will learn and implement problem-solving and decision-making skills.                     Status: New - Date: 9/19/23     Intervention(s): Therapist will conduct problem-solving therapy using techniques such as psychoeducation, modeling, and role-playing to teach patient problem-solving skills (i.e., defining a problem specifically, generating possible solutions, evaluating the pros and cons of each solution, selecting and implementing a plan of action, evaluating the efficacy of the plan, accepting or revising the plan); accepting or revising the plan); role-play application of the problem-solving skill to a real life issue. Encourage the development of a positive problem orientation in which problems and  solving them are viewed as a natural part of life and not something to be feared, despaired, or avoided; reinforce successes toward sustained, effective use.    Objective G    Patient will verbalize an understanding of healthy and unhealthy emotions with the intent of increasing the use of healthy emotions to guide actions.  Status: New - Date: 9/19/23    Intervention(s): Therapist will use a process-experiential approach consistent with emotion-focused therapy to create a safe, nurturing environment in which the patient can process emotions, learning to identify and regulate unhealthy feelings and to generate more adaptive ones that then guide actions.     Goal 2: Patient will experience decrease in anxiety symptoms as evidenced by YG-7 scores.    I will know I've met my goal when I am spending more time doing things just for myself, like being outside or going for walks, and doing things, not just sitting with friends looking at social media.      Objective A    Patient will verbalize an understanding of the cognitive, physiological, and behavioral components of anxiety and its treatment.  Status: New - Date: 9/19/23    Intervention(s): Therapist will discuss how anxiety typically involves excessive worry about unrealistically appraised threats, various bodily expressions of overarousal, hypervigilance, and avoidance of what is threatening that interact to maintain the problem. Discuss how treatment targets worry, anxiety symptoms, and avoidance to help the patient manage worry effectively, reduce overarousal, eliminate unnecessary avoidance, and reengage in rewarding activities.    Objective B  Patient will verbalize an understanding of the role that thinking plays in worry, anxiety, and avoidance.  Status: New - Date: 9/19/23    Intervention(s): Therapist will discuss examples demonstrating how unproductive worry typically overestimates the probability of threats and underestimates or overlooks the patient's  ability to manage realistic demands. Assist the patient in analyzing worries by examining potential biases such as the probability of the negative expectation occurring, the real consequences of it occurring, ability to control the outcome, the worst possible outcome, and ability to accept it. Help the patient gain insight into the notion that worry creates acute and/or chronic anxious apprehension, leading to avoidance that precludes finding solutions to problems.    Objective C  Patient will identify, challenge, and replace biased, fearful self-talk with positive, realistic, and empowering self-talk.  Status: New - Date: 9/19/23    Intervention(s): Therapist will utilize techniques from cognitive behavioral therapies including intolerance of uncertainty and metacognitive therapies explore the patient's self-talk, underlying assumptions, schema, or metacognition that mediate anxiety; assist the patient in challenging and changing biases; conduct behavioral experiments to test biased versus unbiased predictions toward dispelling unproductive worry and increasing self-confidence in addressing the subject of worry. Assign homework exercises to identify fearful self-talk, identify biases in the self-talk, generate alternatives, and test them through behavioral experiments; review and reinforce success, providing corrective feedback toward improvement.    Objective D  Patient will learn and implement calming skills to reduce overall anxiety and manage anxiety.  Status: New - Date: 9/19/23    Intervention(s): Therapist will teach calming/relaxation/mindfulness skills (e.g., applied relaxation, progressive muscle relaxation, cue controlled relaxation; mindful breathing; biofeedback) and how to discriminate better between relaxation and tension; teach the patient how to apply these skills to daily life. Assign homework in which he/she/they practice calming/relaxation/mindfulness skills, gradually applying them  progressively from non-anxiety-provoking to anxiety-provoking situations; review and reinforce success; resolve obstacles toward sustained implementation.    Objective E  Patient will learn and implement problem-solving strategies for realistically addressing worries.  Status: New - Date: 9/19/23    Intervention(s): Therapist will teach problem-solving/solution-finding strategies to replace unproductive worry involving specifically defining a problem, generating options for addressing it, evaluating the pros and cons of each option, selecting and implementing an action plan, and reevaluating and refining the action.    Goal 3: Patient will establish an inward sense of self-worth, confidence, and competence.     I will know I've met my goal when I am wearing something other than sweats outside of work.       Objective A  Patient will increase insight into the historical and current sources of low self-esteem.  Status: New - Date: 9/19/23    Intervention(s): Therapist will help patient become aware of fear of rejection and its connection with past rejection or abandonment experiences; begin to contrast past experiences of pain with present experiences of acceptance and competence. Discuss, emphasize, and interpret the patient's incidents of abuse and how they have affected feelings about self.    Objective B  Patient will decrease the verbalized fear of rejection while increasing statements of self-acceptance.  Status: New - Date: 9/19/23    Intervention(s): Therapist will assign the patient to write positive affirmation statements regarding themselves and the future. Verbally reinforce the patient s use of positive statements of confidence and accomplishments.    Objective C  Patient will identify positive traits and talents about self.  Status: New - Date: 9/19/23    Intervention(s): Therapist will assign patient the exercise of identifying his/her/their positive physical characteristics in a mirror to help  him/her/them become more comfortable with himself/herself/themselves. Ask the patient to keep building a list of positive traits and have him/her/them read the list at the beginning and end of each session     Objective D  Patient will identify and replace negative self-talk messages used to reinforce low self-confidence.  Status: New - Date: 9/19/23     Intervention(s): Therapist will help the patient identify distorted, negative beliefs about self and the world and replace these messages with more realistic, affirmative messages. Ask the patient to complete and process self-esteem-building exercises from recommended self-help books (e.g., Ten Days to Self Esteem by Maeve; The Self-Esteem  by Haroon Haines [place], and Ayad; 10 Simple Solutions for Building Self-Esteem by Mary Jane).     Objective E  Patient will identify and engage in activities that would improve self-image by being consistent with one s values.  Status: New - Date: 9/19/23    Intervention(s): Therapist will help patient analyze his/her/their values and the congruence or incongruence between them and the patient s daily activities. Identify and assign activities congruent with the patient s values; process them toward improving self-concept and self-esteem.    Objective F  Patient will decrease the frequency of negative self-descriptive statements and increase frequency of positive self-descriptive statements.  Status: New - Date: 9/19/23    Intervention(s): Therapist will assist the patient in becoming aware of how he/she/they express or act out negative self-feelings. Help patient reframe his/her/their negative self-assessment. Assist in developing positive self-talk as a way of boosting confidence and self-image.    Objective G    Patient will demonstrate an increased ability to identify and express personal feelings.  Status: New - Date: 9/19/23    Intervention(s): Therapist will assist patient in identifying and labeling  emotions.    Objective H  Patient will articulate a plan to be proactive in trying to get identified needs met.  Status: New - Date: 9/19/23    Intervention(s): Therapist will assist the patient in identifying and verbalizing needs, met and unmet. Assist the patient in developing a specific action plan to get each need met.    Objective I    Patient will positively acknowledge verbal compliments from others.  Status: New - Date: 9/19/23    Intervention(s): Therapist will assign patient to be aware of and acknowledge graciously (without discounting) praise and compliments from others.    Objective J  Patient will take verbal responsibility for accomplishments without discounting.  Status: New - Date: 9/19/23    Intervention(s): Therapist will ask patient list accomplishments; process the integration of these into his/her/their self-image.    Patient has reviewed and agreed to the above plan.    Loree Mendenhall, ALANA  September 19, 2023

## 2023-11-13 ENCOUNTER — OFFICE VISIT (OUTPATIENT)
Dept: MIDWIFE SERVICES | Facility: CLINIC | Age: 18
End: 2023-11-13

## 2023-11-13 VITALS
SYSTOLIC BLOOD PRESSURE: 140 MMHG | BODY MASS INDEX: 27.02 KG/M2 | HEART RATE: 86 BPM | OXYGEN SATURATION: 96 % | DIASTOLIC BLOOD PRESSURE: 79 MMHG | WEIGHT: 183 LBS

## 2023-11-13 DIAGNOSIS — Z11.3 ROUTINE SCREENING FOR STI (SEXUALLY TRANSMITTED INFECTION): Primary | ICD-10-CM

## 2023-11-13 DIAGNOSIS — A59.9 TRICHIMONIASIS: ICD-10-CM

## 2023-11-13 PROCEDURE — 99213 OFFICE O/P EST LOW 20 MIN: CPT | Performed by: ADVANCED PRACTICE MIDWIFE

## 2023-11-13 RX ORDER — METRONIDAZOLE 500 MG/1
500 TABLET ORAL 2 TIMES DAILY
Qty: 14 TABLET | Refills: 1 | Status: SHIPPED | OUTPATIENT
Start: 2023-11-13 | End: 2023-11-20

## 2023-11-13 NOTE — PROGRESS NOTES
Berta Oseguera is a 18 year old who presents to the clinic for continued symptoms of trichomonas. She reports that she had a positive wet prep for trich several weeks ago. She started a course of metronidazole, however, after two days she lost the rest of her prescription did not complete treatment r/t a death in the family and disruption to her normal routine. She requests a new prescription for trichomonas and would like to schedule a follow up in 1-2 wks for proof of cure testing. She denies the need for expedited partner therapy as partner is no longer her partner and far away now.     O: BP (!) 140/79   Pulse 86   Wt 83 kg (183 lb)   LMP 11/02/2023 (Exact Date)   SpO2 96%   BMI 27.02 kg/m    General: well appearing, in NAD  Cardiac: well perfused  Respiratory: non-labored breathing on room air   Psych: alert and oriented     A/P:      (A59.9) Trichimoniasis  Comment: Based on symptomology and previous wet prep result without treatment.   Plan: metroNIDAZOLE (FLAGYL) 500 MG tablet        Plan for clinic visit in 1-2 wks for test of cure. Denies need of contraception currently, or any other needs.         Floridalma Lama, SHAKIR, APRN RODM

## 2023-11-15 ENCOUNTER — VIRTUAL VISIT (OUTPATIENT)
Dept: PSYCHOLOGY | Facility: CLINIC | Age: 18
End: 2023-11-15
Payer: COMMERCIAL

## 2023-11-15 DIAGNOSIS — F43.23 ADJUSTMENT DISORDER WITH MIXED ANXIETY AND DEPRESSED MOOD: Primary | ICD-10-CM

## 2023-11-15 PROCEDURE — 90837 PSYTX W PT 60 MINUTES: CPT | Mod: 95 | Performed by: SOCIAL WORKER

## 2023-11-15 ASSESSMENT — PATIENT HEALTH QUESTIONNAIRE - PHQ9
10. IF YOU CHECKED OFF ANY PROBLEMS, HOW DIFFICULT HAVE THESE PROBLEMS MADE IT FOR YOU TO DO YOUR WORK, TAKE CARE OF THINGS AT HOME, OR GET ALONG WITH OTHER PEOPLE: NOT DIFFICULT AT ALL
SUM OF ALL RESPONSES TO PHQ QUESTIONS 1-9: 7
SUM OF ALL RESPONSES TO PHQ QUESTIONS 1-9: 7

## 2023-11-15 ASSESSMENT — ANXIETY QUESTIONNAIRES
3. WORRYING TOO MUCH ABOUT DIFFERENT THINGS: NEARLY EVERY DAY
GAD7 TOTAL SCORE: 7
2. NOT BEING ABLE TO STOP OR CONTROL WORRYING: SEVERAL DAYS
7. FEELING AFRAID AS IF SOMETHING AWFUL MIGHT HAPPEN: NOT AT ALL
6. BECOMING EASILY ANNOYED OR IRRITABLE: SEVERAL DAYS
4. TROUBLE RELAXING: SEVERAL DAYS
1. FEELING NERVOUS, ANXIOUS, OR ON EDGE: SEVERAL DAYS
GAD7 TOTAL SCORE: 7
5. BEING SO RESTLESS THAT IT IS HARD TO SIT STILL: NOT AT ALL
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
## 2023-11-20 ENCOUNTER — VIRTUAL VISIT (OUTPATIENT)
Dept: PSYCHOLOGY | Facility: CLINIC | Age: 18
End: 2023-11-20
Payer: COMMERCIAL

## 2023-11-20 DIAGNOSIS — F43.23 ADJUSTMENT DISORDER WITH MIXED ANXIETY AND DEPRESSED MOOD: Primary | ICD-10-CM

## 2023-11-20 PROCEDURE — 90837 PSYTX W PT 60 MINUTES: CPT | Mod: 95 | Performed by: SOCIAL WORKER
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
## 2023-11-29 ENCOUNTER — VIRTUAL VISIT (OUTPATIENT)
Dept: PSYCHOLOGY | Facility: CLINIC | Age: 18
End: 2023-11-29
Payer: COMMERCIAL

## 2023-11-29 DIAGNOSIS — F43.23 ADJUSTMENT DISORDER WITH MIXED ANXIETY AND DEPRESSED MOOD: Primary | ICD-10-CM

## 2023-11-29 PROCEDURE — 90837 PSYTX W PT 60 MINUTES: CPT | Mod: 95 | Performed by: SOCIAL WORKER
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
## 2023-12-06 ENCOUNTER — TELEPHONE (OUTPATIENT)
Dept: PSYCHOLOGY | Facility: CLINIC | Age: 18
End: 2023-12-06

## 2023-12-06 NOTE — TELEPHONE ENCOUNTER
Contacted from waitlist to offer appointment. Unable to reach patient. VM left.     Loree Mendenhall [facility] on 12/6/2023 at 5:14 PM

## 2023-12-07 ENCOUNTER — VIRTUAL VISIT (OUTPATIENT)
Dept: PSYCHOLOGY | Facility: CLINIC | Age: 18
End: 2023-12-07
Payer: COMMERCIAL

## 2023-12-07 ENCOUNTER — TELEPHONE (OUTPATIENT)
Dept: PSYCHOLOGY | Facility: CLINIC | Age: 18
End: 2023-12-07

## 2023-12-07 DIAGNOSIS — F43.23 ADJUSTMENT DISORDER WITH MIXED ANXIETY AND DEPRESSED MOOD: Primary | ICD-10-CM

## 2023-12-07 PROCEDURE — 90834 PSYTX W PT 45 MINUTES: CPT | Mod: 95 | Performed by: SOCIAL WORKER

## 2023-12-07 NOTE — TELEPHONE ENCOUNTER
Contacted from waitlist to offer appointment. Patient amenable to meeting at 1630. Advised to call when ready.     Loree Mendenhall [facility] on 12/7/2023 at 4:08 PM

## 2023-12-07 NOTE — PROGRESS NOTES
"  [facility]                                   Progress Note    Patient Name: Berta Oseguera  Date: 12/7/2023           Service Type: Individual  Attendees:  Patient attended alone      Session Start Time: 1634  Session End Time: 1719     Session Length: 38 - 52 min  Session #: 10    Service Modality:  Phone Visit:      Provider verified identity through the following two step process.  Patient provided:  Patient is known previously to provider and Patient was verified at admission/transfer    Telephone Visit: The patient's condition can be safely assessed and treated via synchronous audio telemedicine encounter.      Reason for Audio Telemedicine Visit: Patient has requested telehealth visit and Patient convenience (e.g. access to timely appointments / distance to available provider)    Originating Site (Patient Location): Patient's place of employment    Distant Site (Provider Location): Provider Remote Setting- Home Office    Consent:  The patient/guardian has verbally consented to:     1. The potential risks and benefits of telemedicine (telephone visit) versus in person care;    The patient has been notified of the following:      \"We have found that certain health care needs can be provided without the need for a face to face visit.  This service lets us provide the care you need with a phone conversation.       I will have full access to your [facility] medical record during this entire phone call.   I will be taking notes for your medical record.      Since this is like an office visit, we will bill your insurance company for this service.       There are potential benefits and risks of telephone visits (e.g. limits to patient confidentiality) that differ from in-person visits.? Confidentiality still applies for telephone services, and nobody will record the visit.  It is important to be in a quiet, private space that is free of distractions (including cell phone or other devices) " "during the visit.??      If during the course of the call I believe a telephone visit is not appropriate, you will not be charged for this service\"     Consent has been obtained for this service by care team member: Yes     DATA  Extended Session (53+ minutes): No  Interactive Complexity: No  Crisis: No      Progress Since Last Session (related to symptoms/goals/homework):   Symptoms: No change - stable    Homework: Achieved / completed to satisfaction     Episode of Care Goals: Satisfactory progress - PREPARATION (Decided to change - considering how); Intervened by negotiating a change plan and determining options / strategies for behavior change, identifying triggers, exploring social supports, and working towards setting a date to begin behavior change    Current/Ongoing Stressors and Concerns: independent, social, strong family support, financial stress, trauma hx, recently quit marijuana (9/2023)     Treatment Objective(s) Addressed in This Session:   Verbalize an accurate understanding of depression.  Verbalize an understanding of the rationale for treatment of depression.  Identify and replace thoughts and beliefs that support depression.  Increasingly verbalize hopeful and positive statements regarding self, others, and the future.  Verbalize an understanding of healthy and unhealthy emotions with the intent of increasing the use of healthy emotions to guide actions.  Verbalize an understanding of the cognitive, physiological, and behavioral components of anxiety and its treatment.  Identify, challenge, and replace biased, fearful self-talk with positive, realistic, and empowering self-talk.  Increase insight into the historical and current sources of low self-esteem.  Decrease the verbalized fear of rejection while increasing statements of self-acceptance.  Identify positive traits and talents about self.  Identify and replace negative self-talk messages used to reinforce low self-confidence.  Identify and " engage in activities that would improve self-image by being consistent with one s values.  Decrease the frequency of negative self-descriptive statements and increase frequency of positive self-descriptive statements.  Demonstrate an increased ability to identify and express personal feelings.  Articulate a plan to be proactive in trying to get identified needs met.  Positively acknowledge verbal compliments from others.  Take verbal responsibility for accomplishments without discounting.  Learn and implement:  behavioral strategies to overcome depression  problem-solving and decision-making skills  calming skills to reduce overall anxiety and manage anxiety  problem-solving strategies for realistically addressing worries    Intervention: Presents with euthymic mood, congruent affect. Endorses stable symptoms. Shares that sister has made comments that she has body dysmorphia. Assessed symptoms with no significant findings. Provided active listening as patient reviewed and processed events since last session, including interpersonal relationships and work. Identified need for boundaries. 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 Processed anxiety related to new job endeavor. Explored self-talk and beliefs about self. Assisted in identifying and challenging biases, and generating appraisals that correct for biases. Provided empathy, validation, and unconditional positive regard. Patient was openly engaged. She responded well to the interventions and was able to use the session to make sense of her feelings and experiences.     Assessments completed prior to visit:  PHQ9:       6/21/2022    10:05 PM 9/13/2023    10:54 AM 9/27/2023     1:03 PM 10/18/2023     5:54 PM 11/15/2023    11:03 AM   PHQ-9 SCORE   PHQ-9 Total Score Brandon 19 (Moderately severe depression) 12 (Moderate depression) 13 (Moderate depression) 9 (Mild depression) 7  (Mild depression)   PHQ-9 Total Score 19 12 13 9 7    7     GAD7:       9/13/2023    10:56 AM 9/27/2023     1:03 PM 10/18/2023     5:55 PM 11/15/2023    11:02 AM   YG-7 SCORE   Total Score 16 (severe anxiety) 20 (severe anxiety) 6 (mild anxiety) 7 (mild anxiety)   Total Score 16 20 6 7    7       ASSESSMENT: Current Emotional/Mental Status (status of significant symptoms):   Risk status (Self/Other harm or suicidal ideation)   Patient denies current fears or concerns for personal safety.   Patient denies current or recent suicidal ideation or behaviors.   Patient denies current or recent homicidal ideation or behaviors.   Patient denies current or recent self injurious behavior or ideation.   Patient denies other safety concerns.   Patient reports there has been no change in risk factors since their last session.     Patient reports there has been no change in protective factors since their last session.    Recommended that patient call 911 or go to the local ED should there be a change in any of these risk factors.     Appearance:   Unable to assess    Eye Contact:   Unable to assess    Psychomotor Behavior: Unable to assess    Attitude:   Cooperative  Pleasant   Orientation:   All   Speech    Rate / Production: Normal/ Responsive    Volume:  Normal    Mood:    Normal Euthymic   Affect:    Appropriate    Thought Content:  Clear    Thought Form:  Coherent  Logical    Insight:    Fair      Medication Review: No current psychiatric medications prescribed     Medication Compliance: NA     Changes in Health Issues: None reported     Chemical Use Review:  Substance Use: Problem use continues with no change since last session, Reviewed concerns related to health related substance abuse risk  Patient declined discussion at this time      Quit marijuana September 2023; hx daily use.  Restarted marijuana late September 2023, using socially/recreationally.    Tobacco Use: No change in amount of tobacco use since last  "session.  Patient declined discussion at this time    Diagnosis:  1. 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        Matters to address at future session(s): trauma related to brother's episode of keny    Next appt(s): self-schedule online  Waitlist week of 12/11-18, Monday or Wednesday.     Prefers Mondays/Wednesdays ([facility]).    April L Abdirashid [facility]  12/7/2023                                                  ______________________________________________________________________    Individual Treatment Plan    Patient's Name: Berta Oseguera  YOB: 2005    Date of Creation: 9/19/2023   Date Treatment Plan Last Reviewed/Revised: 9/19/2023; will next review 12/18/23     DSM5 Diagnoses: Adjustment Disorders  309.28 (F43.23) With mixed anxiety and depressed mood  Psychosocial/Contextual Factors: independent, social, strong family support, financial stress, trauma hx, recently quit marijuana (9/2023)  PROMIS (reviewed every 90 days): PROMIS-10 Scores      9/13/2023    11:11 AM   PROMIS-10 Total Score w/o Sub Scores   PROMIS TOTAL - SUBSCORES 20      Referral/Collaboration: Referral to another professional/service is not indicated at this time.    Anticipated number of session for this episode of care: 9-12 sessions  Anticipation frequency of session: Weekly  Anticipated Duration of each session: 38-52 minutes  Treatment plan will be reviewed in 90 days or when goals have been changed.     Measurable Treatment Goal(s) related to diagnosis/functional impairment(s)    \"To gain self-love and self-confidence again, talk about a lot of stuff that's been bothering me, to build a bonding relationship with you [therapist].\"     Goal 1: Patient will experience decrease in depressive symptoms as evidenced by PHQ-9 scores.     I will know I've met my goal when I have better conversations about " my life and what I'm doing, and am less sad and irritable.     Objective A    Patient will verbalize an accurate understanding of depression.  Status: New - Date: 9/19/23    Intervention(s): Therapist will, consistent with the treatment model, discuss how cognitive, behavioral, interpersonal, and/or other factors (e.g. family history) contribute to depression.     Objective B  Patient will verbalize an understanding of the rationale for treatment of depression.  Status: New - Date: 9/19/23    Intervention(s): Therapist will, consistent with the treatment model, discuss how change in cognitive, behavioral, interpersonal, and/or other factors can help alleviate depression and return to previous level of effective functioning.     Objective C    Patient will identify and replace thoughts and beliefs that support depression.  Status: New - Date: 9/19/23    Intervention(s): Therapist will conduct cognitive-behavioral therapy, first conveying the connection among cognition, depressive feelings, and actions. Assign the patient to self-monitor thoughts, feelings, and actions in a journal; process the journal material to identify, challenge, and change depressive thinking patterns and replace them with reality-based thoughts. Identify, challenge, and change depressive thinking patterns and replace them with reality-based thoughts. Assign  behavioral experiments  in and outside of sessions that test depressive automatic thoughts and beliefs against more reality-based ones toward a sustained shift reflecting hopefulness, motivation, confidence, and an improved self-concept. Facilitate and reinforce the patient's shift from biased depressive self-talk and beliefs to reality-based alternatives that enhance emotion regulation and increase adaptive functioning. Explore and restructure underlying assumptions and schema reflected in biased self-talk that may place the patient at risk for relapse or recurrence; help build the  patient's self-concept from unlovable, worthless, helpless, or incompetent to empowering alternatives.    Objective D  Patient will learn and implement behavioral strategies to overcome depression.  Status: New - Date: 9/19/23     Intervention(s): Therapist will engage the patient in  behavioral activation,  increasing his/her/their activity level and contact with sources of reward, while identifying processes that inhibit or obstruct activation; use instruction, rehearsal, role-playing, role reversal, as needed, to facilitate activity in the patient's daily life; reinforce success, problem-solve obstacles. Assist the patient in developing skills that increase the likelihood of deriving pleasure and meaning from behavioral activation (e.g., assertiveness skills, developing an exercise plan, less internal/more external focus, increased social involvement); reinforce success; problem-solve obstacles toward sustained, rewarding activation.    Objective E  Patient will increasingly verbalize hopeful and positive statements regarding self, others, and the future.  Status: New - Date: 9/19/23    Intervention(s): Therapist will teach more about depression and how to recognize and accept some sadness as a normal variation in feeling. Assign the patient to write positive affirmation statements regarding themselves and the future.      Objective F  Patient will learn and implement problem-solving and decision-making skills.                     Status: New - Date: 9/19/23     Intervention(s): Therapist will conduct problem-solving therapy using techniques such as psychoeducation, modeling, and role-playing to teach patient problem-solving skills (i.e., defining a problem specifically, generating possible solutions, evaluating the pros and cons of each solution, selecting and implementing a plan of action, evaluating the efficacy of the plan, accepting or revising the plan); accepting or revising the plan); role-play  application of the problem-solving skill to a real life issue. Encourage the development of a positive problem orientation in which problems and solving them are viewed as a natural part of life and not something to be feared, despaired, or avoided; reinforce successes toward sustained, effective use.    Objective G    Patient will verbalize an understanding of healthy and unhealthy emotions with the intent of increasing the use of healthy emotions to guide actions.  Status: New - Date: 9/19/23    Intervention(s): Therapist will use a process-experiential approach consistent with emotion-focused therapy to create a safe, nurturing environment in which the patient can process emotions, learning to identify and regulate unhealthy feelings and to generate more adaptive ones that then guide actions.     Goal 2: Patient will experience decrease in anxiety symptoms as evidenced by YG-7 scores.    I will know I've met my goal when I am spending more time doing things just for myself, like being outside or going for walks, and doing things, not just sitting with friends looking at social media.      Objective A    Patient will verbalize an understanding of the cognitive, physiological, and behavioral components of anxiety and its treatment.  Status: New - Date: 9/19/23    Intervention(s): Therapist will discuss how anxiety typically involves excessive worry about unrealistically appraised threats, various bodily expressions of overarousal, hypervigilance, and avoidance of what is threatening that interact to maintain the problem. Discuss how treatment targets worry, anxiety symptoms, and avoidance to help the patient manage worry effectively, reduce overarousal, eliminate unnecessary avoidance, and reengage in rewarding activities.    Objective B  Patient will verbalize an understanding of the role that thinking plays in worry, anxiety, and avoidance.  Status: New - Date: 9/19/23    Intervention(s): Therapist will discuss  examples demonstrating how unproductive worry typically overestimates the probability of threats and underestimates or overlooks the patient's ability to manage realistic demands. Assist the patient in analyzing worries by examining potential biases such as the probability of the negative expectation occurring, the real consequences of it occurring, ability to control the outcome, the worst possible outcome, and ability to accept it. Help the patient gain insight into the notion that worry creates acute and/or chronic anxious apprehension, leading to avoidance that precludes finding solutions to problems.    Objective C  Patient will identify, challenge, and replace biased, fearful self-talk with positive, realistic, and empowering self-talk.  Status: New - Date: 9/19/23    Intervention(s): Therapist will utilize techniques from cognitive behavioral therapies including intolerance of uncertainty and metacognitive therapies explore the patient's self-talk, underlying assumptions, schema, or metacognition that mediate anxiety; assist the patient in challenging and changing biases; conduct behavioral experiments to test biased versus unbiased predictions toward dispelling unproductive worry and increasing self-confidence in addressing the subject of worry. Assign homework exercises to identify fearful self-talk, identify biases in the self-talk, generate alternatives, and test them through behavioral experiments; review and reinforce success, providing corrective feedback toward improvement.    Objective D  Patient will learn and implement calming skills to reduce overall anxiety and manage anxiety.  Status: New - Date: 9/19/23    Intervention(s): Therapist will teach calming/relaxation/mindfulness skills (e.g., applied relaxation, progressive muscle relaxation, cue controlled relaxation; mindful breathing; biofeedback) and how to discriminate better between relaxation and tension; teach the patient how to apply these  skills to daily life. Assign homework in which he/she/they practice calming/relaxation/mindfulness skills, gradually applying them progressively from non-anxiety-provoking to anxiety-provoking situations; review and reinforce success; resolve obstacles toward sustained implementation.    Objective E  Patient will learn and implement problem-solving strategies for realistically addressing worries.  Status: New - Date: 9/19/23    Intervention(s): Therapist will teach problem-solving/solution-finding strategies to replace unproductive worry involving specifically defining a problem, generating options for addressing it, evaluating the pros and cons of each option, selecting and implementing an action plan, and reevaluating and refining the action.    Goal 3: Patient will establish an inward sense of self-worth, confidence, and competence.     I will know I've met my goal when I am wearing something other than sweats outside of work.       Objective A  Patient will increase insight into the historical and current sources of low self-esteem.  Status: New - Date: 9/19/23    Intervention(s): Therapist will help patient become aware of fear of rejection and its connection with past rejection or abandonment experiences; begin to contrast past experiences of pain with present experiences of acceptance and competence. Discuss, emphasize, and interpret the patient's incidents of abuse and how they have affected feelings about self.    Objective B  Patient will decrease the verbalized fear of rejection while increasing statements of self-acceptance.  Status: New - Date: 9/19/23    Intervention(s): Therapist will assign the patient to write positive affirmation statements regarding themselves and the future. Verbally reinforce the patient s use of positive statements of confidence and accomplishments.    Objective C  Patient will identify positive traits and talents about self.  Status: New - Date: 9/19/23    Intervention(s):  Therapist will assign patient the exercise of identifying his/her/their positive physical characteristics in a mirror to help him/her/them become more comfortable with himself/herself/themselves. Ask the patient to keep building a list of positive traits and have him/her/them read the list at the beginning and end of each session     Objective D  Patient will identify and replace negative self-talk messages used to reinforce low self-confidence.  Status: New - Date: 9/19/23     Intervention(s): Therapist will help the patient identify distorted, negative beliefs about self and the world and replace these messages with more realistic, affirmative messages. Ask the patient to complete and process self-esteem-building exercises from recommended self-help books (e.g., Ten Days to Self Esteem by Maeve; The Self-Esteem  by Haroon Haines [place] and Ayad; 10 Simple Solutions for Building Self-Esteem by Mary Jane).     Objective E  Patient will identify and engage in activities that would improve self-image by being consistent with one s values.  Status: New - Date: 9/19/23    Intervention(s): Therapist will help patient analyze his/her/their values and the congruence or incongruence between them and the patient s daily activities. Identify and assign activities congruent with the patient s values; process them toward improving self-concept and self-esteem.    Objective F  Patient will decrease the frequency of negative self-descriptive statements and increase frequency of positive self-descriptive statements.  Status: New - Date: 9/19/23    Intervention(s): Therapist will assist the patient in becoming aware of how he/she/they express or act out negative self-feelings. Help patient reframe his/her/their negative self-assessment. Assist in developing positive self-talk as a way of boosting confidence and self-image.    Objective G    Patient will demonstrate an increased ability to identify and express  personal feelings.  Status: New - Date: 9/19/23    Intervention(s): Therapist will assist patient in identifying and labeling emotions.    Objective H  Patient will articulate a plan to be proactive in trying to get identified needs met.  Status: New - Date: 9/19/23    Intervention(s): Therapist will assist the patient in identifying and verbalizing needs, met and unmet. Assist the patient in developing a specific action plan to get each need met.    Objective I    Patient will positively acknowledge verbal compliments from others.  Status: New - Date: 9/19/23    Intervention(s): Therapist will assign patient to be aware of and acknowledge graciously (without discounting) praise and compliments from others.    Objective J  Patient will take verbal responsibility for accomplishments without discounting.  Status: New - Date: 9/19/23    Intervention(s): Therapist will ask patient list accomplishments; process the integration of these into his/her/their self-image.    Patient has reviewed and agreed to the above plan.    Loree Mendenhall, [facility]  September 19, 2023

## 2023-12-14 ENCOUNTER — MYC MEDICAL ADVICE (OUTPATIENT)
Dept: PSYCHOLOGY | Facility: CLINIC | Age: 18
End: 2023-12-14

## 2023-12-19 NOTE — TELEPHONE ENCOUNTER
Called and spoke with patient who confirms ability to meet virtually tomorrow.     Loree Mendenhall [facility] on 12/19/2023 at 2:13 PM
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
## 2023-12-20 ENCOUNTER — VIRTUAL VISIT (OUTPATIENT)
Dept: PSYCHOLOGY | Facility: CLINIC | Age: 18
End: 2023-12-20
Payer: COMMERCIAL

## 2023-12-20 DIAGNOSIS — F43.23 ADJUSTMENT DISORDER WITH MIXED ANXIETY AND DEPRESSED MOOD: Primary | ICD-10-CM

## 2023-12-20 PROCEDURE — 90837 PSYTX W PT 60 MINUTES: CPT | Mod: 95 | Performed by: SOCIAL WORKER

## 2023-12-20 ASSESSMENT — ANXIETY QUESTIONNAIRES
IF YOU CHECKED OFF ANY PROBLEMS ON THIS QUESTIONNAIRE, HOW DIFFICULT HAVE THESE PROBLEMS MADE IT FOR YOU TO DO YOUR WORK, TAKE CARE OF THINGS AT HOME, OR GET ALONG WITH OTHER PEOPLE: SOMEWHAT DIFFICULT
3. WORRYING TOO MUCH ABOUT DIFFERENT THINGS: MORE THAN HALF THE DAYS
4. TROUBLE RELAXING: NOT AT ALL
GAD7 TOTAL SCORE: 7
6. BECOMING EASILY ANNOYED OR IRRITABLE: NOT AT ALL
5. BEING SO RESTLESS THAT IT IS HARD TO SIT STILL: NOT AT ALL
2. NOT BEING ABLE TO STOP OR CONTROL WORRYING: NEARLY EVERY DAY
7. FEELING AFRAID AS IF SOMETHING AWFUL MIGHT HAPPEN: NOT AT ALL
1. FEELING NERVOUS, ANXIOUS, OR ON EDGE: MORE THAN HALF THE DAYS
GAD7 TOTAL SCORE: 7

## 2023-12-20 ASSESSMENT — PATIENT HEALTH QUESTIONNAIRE - PHQ9
10. IF YOU CHECKED OFF ANY PROBLEMS, HOW DIFFICULT HAVE THESE PROBLEMS MADE IT FOR YOU TO DO YOUR WORK, TAKE CARE OF THINGS AT HOME, OR GET ALONG WITH OTHER PEOPLE: SOMEWHAT DIFFICULT
SUM OF ALL RESPONSES TO PHQ QUESTIONS 1-9: 3
SUM OF ALL RESPONSES TO PHQ QUESTIONS 1-9: 3

## 2023-12-20 NOTE — PROGRESS NOTES
"  [facility]                                   Progress Note    Patient Name: Berta Oseguera  Date: 12/20/2023           Service Type: Individual  Attendees:  Patient attended alone      Session Start Time: 1507  Session End Time: 1602     Session Length: 53+ min  Session #: 11    Service Modality:  Phone Visit:      Provider verified identity through the following two step process.  Patient provided:  Patient is known previously to provider and Patient was verified at admission/transfer  Telephone Visit: The patient's condition can be safely assessed and treated via synchronous audio telemedicine encounter.    Reason for Audio Telemedicine Visit: Patient has requested telehealth visit and Patient convenience (e.g. access to timely appointments / distance to available provider)  Originating Site (Patient Location): Patient's place of employment  Distant Site (Provider Location): Provider Remote Setting- Home Office  Consent:  The patient/guardian has verbally consented to:   1. The potential risks and benefits of telemedicine (telephone visit) versus in person care;  The patient has been notified of the following:    \"We have found that certain health care needs can be provided without the need for a face to face visit.  This service lets us provide the care you need with a phone conversation.    I will have full access to your [facility] medical record during this entire phone call.   I will be taking notes for your medical record.   Since this is like an office visit, we will bill your insurance company for this service.    There are potential benefits and risks of telephone visits (e.g. limits to patient confidentiality) that differ from in-person visits.? Confidentiality still applies for telephone services, and nobody will record the visit.  It is important to be in a quiet, private space that is free of distractions (including cell phone or other devices) during the visit.??    If " "during the course of the call I believe a telephone visit is not appropriate, you will not be charged for this service\"  Consent has been obtained for this service by care team member: Yes     DATA  Extended Session (53+ minutes): PROLONGED SERVICE IN THE OUTPATIENT SETTING REQUIRING DIRECT (FACE-TO-FACE) PATIENT CONTACT BEYOND THE USUAL SERVICE:    - Longer session due to limited access to mental health appointments and necessity to address patient's distress / complexity    - Patient's presenting concerns require more intensive intervention than could be completed within the usual service  Interactive Complexity: No  Crisis: No      Progress Since Last Session (related to symptoms/goals/homework):   Symptoms: No change - stable    Homework: Achieved / completed to satisfaction     Episode of Care Goals: Satisfactory progress - PREPARATION (Decided to change - considering how); Intervened by negotiating a change plan and determining options / strategies for behavior change, identifying triggers, exploring social supports, and working towards setting a date to begin behavior change    Current/Ongoing Stressors and Concerns: independent, social, strong family support, financial stress, trauma hx, recently quit marijuana (9/2023)     Treatment Objective(s) Addressed in This Session:   Verbalize an accurate understanding of depression.  Verbalize an understanding of the rationale for treatment of depression.  Identify and replace thoughts and beliefs that support depression.  Increasingly verbalize hopeful and positive statements regarding self, others, and the future.  Verbalize an understanding of healthy and unhealthy emotions with the intent of increasing the use of healthy emotions to guide actions.  Verbalize an understanding of the cognitive, physiological, and behavioral components of anxiety and its treatment.  Identify, challenge, and replace biased, fearful self-talk with positive, realistic, and empowering " "self-talk.  Increase insight into the historical and current sources of low self-esteem.  Decrease the verbalized fear of rejection while increasing statements of self-acceptance.  Identify positive traits and talents about self.  Identify and replace negative self-talk messages used to reinforce low self-confidence.  Identify and engage in activities that would improve self-image by being consistent with one s values.  Decrease the frequency of negative self-descriptive statements and increase frequency of positive self-descriptive statements.  Demonstrate an increased ability to identify and express personal feelings.  Articulate a plan to be proactive in trying to get identified needs met.  Positively acknowledge verbal compliments from others.  Take verbal responsibility for accomplishments without discounting.  Learn and implement:  behavioral strategies to overcome depression  problem-solving and decision-making skills  calming skills to reduce overall anxiety and manage anxiety  problem-solving strategies for realistically addressing worries    Intervention: Presents with euthymic mood, congruent affect. Reports lower mood and increased anxiety secondary to financial stress, which is not reflected in the PHQ and YG scores. Checked-in with patient with regards to therapeutic process, progress, and relationship. Patient endorses positive rapport and reports feeling that therapy is beneficial. Reports that therapy \"helps [her] a lot, safe place to work through my feelings and better understand myself.\" Denies concerns, aspects that aren't working, or need for change from writer with regards to style or process. Expresses desire to continue working towards previously identified treatment goals, which were updated to reflect growth and additional opportunities. Treatment plan updated accordingly. Provided active listening as patient reviewed and processed events since last session, including BB coaching, and job " search. Explored the ways patient expresses or acts out uncomfortable feelings. Worked to build self-awareness to support patient in choosing differently in the future. Provided empathy, validation, and unconditional positive regard. Patient was openly engaged. She responded well to the interventions and was able to use the session to make sense of her feelings and experiences.     Assessments completed prior to visit:  PHQ9:       6/21/2022    10:05 PM 9/13/2023    10:54 AM 9/27/2023     1:03 PM 10/18/2023     5:54 PM 11/15/2023    11:03 AM 12/20/2023     3:14 PM   PHQ-9 SCORE   PHQ-9 Total Score MyChart 19 (Moderately severe depression) 12 (Moderate depression) 13 (Moderate depression) 9 (Mild depression) 7 (Mild depression) 3 (Minimal depression)   PHQ-9 Total Score 19 12 13 9 7    7 3     GAD7:       9/13/2023    10:56 AM 9/27/2023     1:03 PM 10/18/2023     5:55 PM 11/15/2023    11:02 AM 12/20/2023     3:16 PM   YG-7 SCORE   Total Score 16 (severe anxiety) 20 (severe anxiety) 6 (mild anxiety) 7 (mild anxiety) 7 (mild anxiety)   Total Score 16 20 6 7    7 7       ASSESSMENT: Current Emotional/Mental Status (status of significant symptoms):   Risk status (Self/Other harm or suicidal ideation)   Patient denies current fears or concerns for personal safety.   Patient denies current or recent suicidal ideation or behaviors.   Patient denies current or recent homicidal ideation or behaviors.   Patient denies current or recent self injurious behavior or ideation.   Patient denies other safety concerns.   Patient reports there has been no change in risk factors since their last session.     Patient reports there has been no change in protective factors since their last session.    Recommended that patient call 911 or go to the local ED should there be a change in any of these risk factors.     Appearance:   Unable to assess    Eye Contact:   Unable to assess    Psychomotor Behavior: Unable to assess     Attitude:   Cooperative  Pleasant   Orientation:   All   Speech    Rate / Production: Normal/ Responsive    Volume:  Normal    Mood:    Normal Euthymic   Affect:    Appropriate    Thought Content:  Clear    Thought Form:  Coherent  Logical    Insight:    Fair      Medication Review: No current psychiatric medications prescribed     Medication Compliance: NA     Changes in Health Issues: None reported     Chemical Use Review:  Substance Use: Problem use continues with no change since last session, Reviewed concerns related to health related substance abuse risk  Patient declined discussion at this time      Quit marijuana September 2023; hx daily use.  Restarted marijuana late September 2023, using socially/recreationally.    Tobacco Use: No change in amount of tobacco use since last session.  Patient declined discussion at this time    Diagnosis:  1. Adjustment disorder with mixed anxiety and depressed mood      Collateral Reports Completed: Not Applicable    PLAN: self-schedule follow up online      Next appt(s): self-schedule online  Waitlist week of 12/25, 1/2 Monday or Wednesday.  Prefers Mondays/Wednesdays ([facility]).    April ALMAS [facility]  12/20/2023                                                  ______________________________________________________________________    Individual Treatment Plan    Patient's Name: Berta Oseguera  YOB: 2005    Date of Creation: 9/19/2023   Date Treatment Plan Last Reviewed/Revised: 12/20/2023; will next review 3/19/24      DSM5 Diagnoses: Adjustment Disorders  309.28 (F43.23) With mixed anxiety and depressed mood  Psychosocial/Contextual Factors: independent, social, strong family support, financial stress, trauma hx, recently quit marijuana (9/2023)  PROMIS (reviewed every 90 days): PROMIS-10 Scores      9/13/2023    11:11 AM 12/20/2023     3:19 PM   PROMIS-10 Total Score w/o Sub Scores   PROMIS TOTAL - SUBSCORES 20 25     "  Referral/Collaboration: Referral to another professional/service is not indicated at this time.    Anticipated number of session for this episode of care: 9-12 sessions  Anticipation frequency of session: Weekly  Anticipated Duration of each session: 38-52 minutes  Treatment plan will be reviewed in 90 days or when goals have been changed.     Measurable Treatment Goal(s) related to diagnosis/functional impairment(s)    \"To gain self-love and self-confidence again, talk about a lot of stuff that's been bothering me, to build a bonding relationship with you [therapist].\"     Goal 1: Patient will experience decrease in depressive symptoms as evidenced by PHQ-9 scores.    I will know I've met my goal when I have better conversations about my life and what I'm doing, and am less sad and irritable.   12/20/23: \"Marci the same. I'm less irritable, but still .... I\"m just always worried.\"     Objective A    Patient will verbalize an accurate understanding of depression.  Status:  Started 9/19/23, continued 12/20/23    Intervention(s): Therapist will, consistent with the treatment model, discuss how cognitive, behavioral, interpersonal, and/or other factors (e.g. family history) contribute to depression.     Objective B  Patient will verbalize an understanding of the rationale for treatment of depression.  Status:  Started 9/19/23, continued 12/20/23    Intervention(s): Therapist will, consistent with the treatment model, discuss how change in cognitive, behavioral, interpersonal, and/or other factors can help alleviate depression and return to previous level of effective functioning.     Objective C    Patient will identify and replace thoughts and beliefs that support depression.  Status:  Started 9/19/23, continued 12/20/23    Intervention(s): Therapist will conduct cognitive-behavioral therapy, first conveying the connection among cognition, depressive feelings, and actions. Assign the patient to self-monitor " thoughts, feelings, and actions in a journal; process the journal material to identify, challenge, and change depressive thinking patterns and replace them with reality-based thoughts. Identify, challenge, and change depressive thinking patterns and replace them with reality-based thoughts. Assign  behavioral experiments  in and outside of sessions that test depressive automatic thoughts and beliefs against more reality-based ones toward a sustained shift reflecting hopefulness, motivation, confidence, and an improved self-concept. Facilitate and reinforce the patient's shift from biased depressive self-talk and beliefs to reality-based alternatives that enhance emotion regulation and increase adaptive functioning. Explore and restructure underlying assumptions and schema reflected in biased self-talk that may place the patient at risk for relapse or recurrence; help build the patient's self-concept from unlovable, worthless, helpless, or incompetent to empowering alternatives.    Objective D  Patient will learn and implement behavioral strategies to overcome depression.  Status:  Started 9/19/23, continued 12/20/23     Intervention(s): Therapist will engage the patient in  behavioral activation,  increasing his/her/their activity level and contact with sources of reward, while identifying processes that inhibit or obstruct activation; use instruction, rehearsal, role-playing, role reversal, as needed, to facilitate activity in the patient's daily life; reinforce success, problem-solve obstacles. Assist the patient in developing skills that increase the likelihood of deriving pleasure and meaning from behavioral activation (e.g., assertiveness skills, developing an exercise plan, less internal/more external focus, increased social involvement); reinforce success; problem-solve obstacles toward sustained, rewarding activation.    Objective E  Patient will increasingly verbalize hopeful and positive statements  regarding self, others, and the future.  Status:  Started 9/19/23, continued 12/20/23    Intervention(s): Therapist will teach more about depression and how to recognize and accept some sadness as a normal variation in feeling. Assign the patient to write positive affirmation statements regarding themselves and the future.      Objective F  Patient will learn and implement problem-solving and decision-making skills.                     Status:  Started 9/19/23, continued 12/20/23     Intervention(s): Therapist will conduct problem-solving therapy using techniques such as psychoeducation, modeling, and role-playing to teach patient problem-solving skills (i.e., defining a problem specifically, generating possible solutions, evaluating the pros and cons of each solution, selecting and implementing a plan of action, evaluating the efficacy of the plan, accepting or revising the plan); accepting or revising the plan); role-play application of the problem-solving skill to a real life issue. Encourage the development of a positive problem orientation in which problems and solving them are viewed as a natural part of life and not something to be feared, despaired, or avoided; reinforce successes toward sustained, effective use.    Objective G    Patient will verbalize an understanding of healthy and unhealthy emotions with the intent of increasing the use of healthy emotions to guide actions.  Status:  Started 9/19/23, continued 12/20/23    Intervention(s): Therapist will use a process-experiential approach consistent with emotion-focused therapy to create a safe, nurturing environment in which the patient can process emotions, learning to identify and regulate unhealthy feelings and to generate more adaptive ones that then guide actions.     Goal 2: Patient will experience decrease in anxiety symptoms as evidenced by YG-7 scores.   I will know I've met my goal when I am spending more time doing things just for myself,  "like being outside or going for walks, and doing things, not just sitting with friends looking at social media.    12/20/23: \"This has been better. I hang out with my siblings and family a lot more.\" Worrying more, always worried about the next bill, how I'm gonna pay it, how I'm gonna pay for food and gas.\"     Objective A    Patient will verbalize an understanding of the cognitive, physiological, and behavioral components of anxiety and its treatment.  Status:  Started 9/19/23, continued 12/20/23    Intervention(s): Therapist will discuss how anxiety typically involves excessive worry about unrealistically appraised threats, various bodily expressions of overarousal, hypervigilance, and avoidance of what is threatening that interact to maintain the problem. Discuss how treatment targets worry, anxiety symptoms, and avoidance to help the patient manage worry effectively, reduce overarousal, eliminate unnecessary avoidance, and reengage in rewarding activities.    Objective B  Patient will verbalize an understanding of the role that thinking plays in worry, anxiety, and avoidance.  Status:  Started 9/19/23, continued 12/20/23    Intervention(s): Therapist will discuss examples demonstrating how unproductive worry typically overestimates the probability of threats and underestimates or overlooks the patient's ability to manage realistic demands. Assist the patient in analyzing worries by examining potential biases such as the probability of the negative expectation occurring, the real consequences of it occurring, ability to control the outcome, the worst possible outcome, and ability to accept it. Help the patient gain insight into the notion that worry creates acute and/or chronic anxious apprehension, leading to avoidance that precludes finding solutions to problems.    Objective C  Patient will identify, challenge, and replace biased, fearful self-talk with positive, realistic, and empowering self-talk.  Status:  " Started 9/19/23, continued 12/20/23    Intervention(s): Therapist will utilize techniques from cognitive behavioral therapies including intolerance of uncertainty and metacognitive therapies explore the patient's self-talk, underlying assumptions, schema, or metacognition that mediate anxiety; assist the patient in challenging and changing biases; conduct behavioral experiments to test biased versus unbiased predictions toward dispelling unproductive worry and increasing self-confidence in addressing the subject of worry. Assign homework exercises to identify fearful self-talk, identify biases in the self-talk, generate alternatives, and test them through behavioral experiments; review and reinforce success, providing corrective feedback toward improvement.    Objective D  Patient will learn and implement calming skills to reduce overall anxiety and manage anxiety.  Status:  Started 9/19/23, continued 12/20/23    Intervention(s): Therapist will teach calming/relaxation/mindfulness skills (e.g., applied relaxation, progressive muscle relaxation, cue controlled relaxation; mindful breathing; biofeedback) and how to discriminate better between relaxation and tension; teach the patient how to apply these skills to daily life. Assign homework in which he/she/they practice calming/relaxation/mindfulness skills, gradually applying them progressively from non-anxiety-provoking to anxiety-provoking situations; review and reinforce success; resolve obstacles toward sustained implementation.    Objective E  Patient will learn and implement problem-solving strategies for realistically addressing worries.  Status:  Started 9/19/23, continued 12/20/23    Intervention(s): Therapist will teach problem-solving/solution-finding strategies to replace unproductive worry involving specifically defining a problem, generating options for addressing it, evaluating the pros and cons of each option, selecting and implementing an action plan,  and reevaluating and refining the action.    Goal 3: Patient will establish an inward sense of self-worth, confidence, and competence.    I will know I've met my goal when I am wearing something other than sweats outside of work.    12/20/23: No change. Wears sweats a lot in winter because of cold. Will take more pride in appearance. Would like to pick at pimples less.     Objective A  Patient will increase insight into the historical and current sources of low self-esteem.  Status:  Started 9/19/23, continued 12/20/23    Intervention(s): Therapist will help patient become aware of fear of rejection and its connection with past rejection or abandonment experiences; begin to contrast past experiences of pain with present experiences of acceptance and competence. Discuss, emphasize, and interpret the patient's incidents of abuse and how they have affected feelings about self.    Objective B  Patient will decrease the verbalized fear of rejection while increasing statements of self-acceptance.  Status:  Started 9/19/23, continued 12/20/23    Intervention(s): Therapist will assign the patient to write positive affirmation statements regarding themselves and the future. Verbally reinforce the patient s use of positive statements of confidence and accomplishments.    Objective C  Patient will identify positive traits and talents about self.  Status:  Started 9/19/23, continued 12/20/23    Intervention(s): Therapist will assign patient the exercise of identifying his/her/their positive physical characteristics in a mirror to help him/her/them become more comfortable with himself/herself/themselves. Ask the patient to keep building a list of positive traits and have him/her/them read the list at the beginning and end of each session     Objective D  Patient will identify and replace negative self-talk messages used to reinforce low self-confidence.  Status:  Started 9/19/23, continued 12/20/23     Intervention(s): Therapist  will help the patient identify distorted, negative beliefs about self and the world and replace these messages with more realistic, affirmative messages. Ask the patient to complete and process self-esteem-building exercises from recommended self-help books (e.g., Ten Days to Self Esteem by Maeve; The Self-Esteem  by Haroon Haines [place] and Ayad; 10 Simple Solutions for Building Self-Esteem by Mary Jane).     Objective E  Patient will identify and engage in activities that would improve self-image by being consistent with one s values.  Status:  Started 9/19/23, continued 12/20/23    Intervention(s): Therapist will help patient analyze his/her/their values and the congruence or incongruence between them and the patient s daily activities. Identify and assign activities congruent with the patient s values; process them toward improving self-concept and self-esteem.    Objective F  Patient will decrease the frequency of negative self-descriptive statements and increase frequency of positive self-descriptive statements.  Status:  Started 9/19/23, continued 12/20/23    Intervention(s): Therapist will assist the patient in becoming aware of how he/she/they express or act out negative self-feelings. Help patient reframe his/her/their negative self-assessment. Assist in developing positive self-talk as a way of boosting confidence and self-image.    Objective G    Patient will demonstrate an increased ability to identify and express personal feelings.  Status:  Started 9/19/23, continued 12/20/23    Intervention(s): Therapist will assist patient in identifying and labeling emotions.    Objective H  Patient will articulate a plan to be proactive in trying to get identified needs met.  Status:  Started 9/19/23, continued 12/20/23    Intervention(s): Therapist will assist the patient in identifying and verbalizing needs, met and unmet. Assist the patient in developing a specific action plan to get each  need met.    Objective I    Patient will positively acknowledge verbal compliments from others.  Status:  Started 9/19/23, continued 12/20/23    Intervention(s): Therapist will assign patient to be aware of and acknowledge graciously (without discounting) praise and compliments from others.    Objective J  Patient will take verbal responsibility for accomplishments without discounting.  Status:  Started 9/19/23, continued 12/20/23    Intervention(s): Therapist will ask patient list accomplishments; process the integration of these into his/her/their self-image.    Patient has [id number] and agreed to the above [email or web address]    Loree Mendenhall, LICSW  December 20, 2023

## 2024-01-04 ENCOUNTER — VIRTUAL VISIT (OUTPATIENT)
Dept: PSYCHOLOGY | Facility: CLINIC | Age: 19
End: 2024-01-04
Payer: COMMERCIAL

## 2024-01-04 DIAGNOSIS — F43.23 ADJUSTMENT DISORDER WITH MIXED ANXIETY AND DEPRESSED MOOD: Primary | ICD-10-CM

## 2024-01-04 PROCEDURE — 90837 PSYTX W PT 60 MINUTES: CPT | Mod: FQ | Performed by: SOCIAL WORKER

## 2024-01-04 NOTE — PROGRESS NOTES
"  [facility]                                   Progress Note    Patient Name: Berta Oseguera  Date: 1/4/2024            Service Type: Individual  Attendees:  Patient attended alone      Session Start Time: 1621  Session End Time: 1725     Session Length: 53+ min  Session #: 12    Service Modality:  Phone Visit:      Provider verified identity through the following two step process.  Patient provided:  Patient is known previously to provider and Patient was verified at admission/transfer  Telephone Visit: The patient's condition can be safely assessed and treated via synchronous audio telemedicine encounter.    Reason for Audio Telemedicine Visit: Patient has requested telehealth visit and Patient convenience (e.g. access to timely appointments / distance to available provider)  Originating Site (Patient Location): Patient's place of employment  Distant Site (Provider Location): Provider Remote Setting- Home Office  Consent:  The patient/guardian has verbally consented to:   1. The potential risks and benefits of telemedicine (telephone visit) versus in person care;  The patient has been notified of the following:    \"We have found that certain health care needs can be provided without the need for a face to face visit.  This service lets us provide the care you need with a phone conversation.    I will have full access to your [facility] medical record during this entire phone call.   I will be taking notes for your medical record.   Since this is like an office visit, we will bill your insurance company for this service.    There are potential benefits and risks of telephone visits (e.g. limits to patient confidentiality) that differ from in-person visits.? Confidentiality still applies for telephone services, and nobody will record the visit.  It is important to be in a quiet, private space that is free of distractions (including cell phone or other devices) during the visit.??    If " "during the course of the call I believe a telephone visit is not appropriate, you will not be charged for this service\"  Consent has been obtained for this service by care team member: Yes     DATA  Extended Session (53+ minutes): PROLONGED SERVICE IN THE OUTPATIENT SETTING REQUIRING DIRECT (FACE-TO-FACE) PATIENT CONTACT BEYOND THE USUAL SERVICE:    - Longer session due to limited access to mental health appointments and necessity to address patient's distress / complexity    - Patient's presenting concerns require more intensive intervention than could be completed within the usual service  Interactive Complexity: No  Crisis: No      Progress Since Last Session (related to symptoms/goals/homework):   Symptoms: No change - stable    Homework: Achieved / completed to satisfaction     Episode of Care Goals: Satisfactory progress - PREPARATION (Decided to change - considering how); Intervened by negotiating a change plan and determining options / strategies for behavior change, identifying triggers, exploring social supports, and working towards setting a date to begin behavior change    Current/Ongoing Stressors and Concerns: independent, social, strong family support, financial stress, trauma hx, recently quit marijuana (9/2023)     Treatment Objective(s) Addressed in This Session:   Verbalize an accurate understanding of depression.  Verbalize an understanding of the rationale for treatment of depression.  Identify and replace thoughts and beliefs that support depression.  Increasingly verbalize hopeful and positive statements regarding self, others, and the future.  Verbalize an understanding of healthy and unhealthy emotions with the intent of increasing the use of healthy emotions to guide actions.  Verbalize an understanding of the cognitive, physiological, and behavioral components of anxiety and its treatment.  Identify, challenge, and replace biased, fearful self-talk with positive, realistic, and empowering " "self-talk.  Increase insight into the historical and current sources of low self-esteem.  Decrease the verbalized fear of rejection while increasing statements of self-acceptance.  Identify positive traits and talents about self.  Identify and replace negative self-talk messages used to reinforce low self-confidence.  Identify and engage in activities that would improve self-image by being consistent with one s values.  Decrease the frequency of negative self-descriptive statements and increase frequency of positive self-descriptive statements.  Demonstrate an increased ability to identify and express personal feelings.  Articulate a plan to be proactive in trying to get identified needs met.  Positively acknowledge verbal compliments from others.  Take verbal responsibility for accomplishments without discounting.  Learn and implement:  behavioral strategies to overcome depression  problem-solving and decision-making skills  calming skills to reduce overall anxiety and manage anxiety  problem-solving strategies for realistically addressing worries    Intervention: Contacted from waitlist; patient amenable to meeting. Presents with euthymic mood, congruent affect. Endorses stable symptoms. Reports feeling \"irritated today,\" citing frustrations at work as the source. Processed work frustrations. Assisted in identifying specific behaviors that have caused difficulty. Processed negative consequences of behavior(s), and worked to increase awareness of the impact of behavior on self and others. Shares that she has started a new job at a local [employer] and will be working six days/week; is looking forward to the additional income, which has helped to alleviate some stress. Explored romantic feelings towards two peers. Assisted in reducing the defensiveness so as to be able to build relationships. Reinforced demonstration of insight into the impact of her behavior on self and others. Provided empathy, validation, and " unconditional positive regard. Patient was active and engaged in all aspects of the session. She responded well to the interventions and was able to use the session to make sense of her feelings and experiences.     Assessments completed prior to visit:  PHQ9:       6/21/2022    10:05 PM 9/13/2023    10:54 AM 9/27/2023     1:03 PM 10/18/2023     5:54 PM 11/15/2023    11:03 AM 12/20/2023     3:14 PM   PHQ-9 SCORE   PHQ-9 Total Score MyChart 19 (Moderately severe depression) 12 (Moderate depression) 13 (Moderate depression) 9 (Mild depression) 7 (Mild depression) 3 (Minimal depression)   PHQ-9 Total Score 19 12 13 9 7    7 3     GAD7:       9/13/2023    10:56 AM 9/27/2023     1:03 PM 10/18/2023     5:55 PM 11/15/2023    11:02 AM 12/20/2023     3:16 PM   YG-7 SCORE   Total Score 16 (severe anxiety) 20 (severe anxiety) 6 (mild anxiety) 7 (mild anxiety) 7 (mild anxiety)   Total Score 16 20 6 7    7 7       ASSESSMENT: Current Emotional/Mental Status (status of significant symptoms):   Risk status (Self/Other harm or suicidal ideation)   Patient denies current fears or concerns for personal safety.   Patient denies current or recent suicidal ideation or behaviors.   Patient denies current or recent homicidal ideation or behaviors.   Patient denies current or recent self injurious behavior or ideation.   Patient denies other safety concerns.   Patient reports there has been no change in risk factors since their last session.     Patient reports there has been no change in protective factors since their last session.    Recommended that patient call 911 or go to the local ED should there be a change in any of these risk factors.     Appearance:   Unable to assess    Eye Contact:   Unable to assess    Psychomotor Behavior: Unable to assess    Attitude:   Cooperative  Pleasant   Orientation:   All   Speech    Rate / Production: Normal/ Responsive    Volume:  Normal    Mood:    Normal Euthymic   Affect:    Appropriate     Thought Content:  Clear    Thought Form:  Coherent  Logical    Insight:    Fair      Medication Review: No current psychiatric medications prescribed     Medication Compliance: NA     Changes in Health Issues: None reported     Chemical Use Review:  Substance Use: Problem use continues with no change since last session, Reviewed concerns related to health related substance abuse risk  Patient declined discussion at this time      Quit marijuana September 2023; hx daily use.  Restarted marijuana late September 2023, using socially/recreationally.    Tobacco Use: No change in amount of tobacco use since last session.  Patient declined discussion at this time    Diagnosis:  1. Adjustment disorder with mixed anxiety and depressed mood      Collateral Reports Completed: Not Applicable    PLAN: Complete questionnaires and self-schedule online. Plan to be to work 20 minutes early.       Next appt(s): self-schedule  Prefers Mondays/Wednesdays ([facility]).    Waitlist week of January 8, Tuesday or Wednesday ([facility]).     April ALMAS Mendenhall [facility]  1/4/2024                                                   ______________________________________________________________________    Individual Treatment Plan    Patient's Name: Berta Oseguera  YOB: 2005    Date of Creation: 9/19/2023   Date Treatment Plan Last Reviewed/Revised: 12/20/2023; will next review 3/19/24      DSM5 Diagnoses: Adjustment Disorders  309.28 (F43.23) With mixed anxiety and depressed mood  Psychosocial/Contextual Factors: independent, social, strong family support, financial stress, trauma hx, recently quit marijuana (9/2023)  PROMIS (reviewed every 90 days): PROMIS-10 Scores      9/13/2023    11:11 AM 12/20/2023     3:19 PM   PROMIS-10 Total Score w/o Sub Scores   PROMIS TOTAL - SUBSCORES 20 25      Referral/Collaboration: Referral to another professional/service is not indicated at this time.    Anticipated number of session for this  "episode of care: 9-12 sessions  Anticipation frequency of session: Weekly  Anticipated Duration of each session: 38-52 minutes  Treatment plan will be reviewed in 90 days or when goals have been changed.     Measurable Treatment Goal(s) related to diagnosis/functional impairment(s)    \"To gain self-love and self-confidence again, talk about a lot of stuff that's been bothering me, to build a bonding relationship with you [therapist].\"     Goal 1: Patient will experience decrease in depressive symptoms as evidenced by PHQ-9 scores.    I will know I've met my goal when I have better conversations about my life and what I'm doing, and am less sad and irritable.   12/20/23: \"Marci the same. I'm less irritable, but still .... I\"m just always worried.\"     Objective A    Patient will verbalize an accurate understanding of depression.  Status:  Started 9/19/23, continued 12/20/23    Intervention(s): Therapist will, consistent with the treatment model, discuss how cognitive, behavioral, interpersonal, and/or other factors (e.g. family history) contribute to depression.     Objective B  Patient will verbalize an understanding of the rationale for treatment of depression.  Status:  Started 9/19/23, continued 12/20/23    Intervention(s): Therapist will, consistent with the treatment model, discuss how change in cognitive, behavioral, interpersonal, and/or other factors can help alleviate depression and return to previous level of effective functioning.     Objective C    Patient will identify and replace thoughts and beliefs that support depression.  Status:  Started 9/19/23, continued 12/20/23    Intervention(s): Therapist will conduct cognitive-behavioral therapy, first conveying the connection among cognition, depressive feelings, and actions. Assign the patient to self-monitor thoughts, feelings, and actions in a journal; process the journal material to identify, challenge, and change depressive thinking patterns and " replace them with reality-based thoughts. Identify, challenge, and change depressive thinking patterns and replace them with reality-based thoughts. Assign  behavioral experiments  in and outside of sessions that test depressive automatic thoughts and beliefs against more reality-based ones toward a sustained shift reflecting hopefulness, motivation, confidence, and an improved self-concept. Facilitate and reinforce the patient's shift from biased depressive self-talk and beliefs to reality-based alternatives that enhance emotion regulation and increase adaptive functioning. Explore and restructure underlying assumptions and schema reflected in biased self-talk that may place the patient at risk for relapse or recurrence; help build the patient's self-concept from unlovable, worthless, helpless, or incompetent to empowering alternatives.    Objective D  Patient will learn and implement behavioral strategies to overcome depression.  Status:  Started 9/19/23, continued 12/20/23     Intervention(s): Therapist will engage the patient in  behavioral activation,  increasing his/her/their activity level and contact with sources of reward, while identifying processes that inhibit or obstruct activation; use instruction, rehearsal, role-playing, role reversal, as needed, to facilitate activity in the patient's daily life; reinforce success, problem-solve obstacles. Assist the patient in developing skills that increase the likelihood of deriving pleasure and meaning from behavioral activation (e.g., assertiveness skills, developing an exercise plan, less internal/more external focus, increased social involvement); reinforce success; problem-solve obstacles toward sustained, rewarding activation.    Objective E  Patient will increasingly verbalize hopeful and positive statements regarding self, others, and the future.  Status:  Started 9/19/23, continued 12/20/23    Intervention(s): Therapist will teach more about depression  "and how to recognize and accept some sadness as a normal variation in feeling. Assign the patient to write positive affirmation statements regarding themselves and the future.      Objective F  Patient will learn and implement problem-solving and decision-making skills.                     Status:  Started 9/19/23, continued 12/20/23     Intervention(s): Therapist will conduct problem-solving therapy using techniques such as psychoeducation, modeling, and role-playing to teach patient problem-solving skills (i.e., defining a problem specifically, generating possible solutions, evaluating the pros and cons of each solution, selecting and implementing a plan of action, evaluating the efficacy of the plan, accepting or revising the plan); accepting or revising the plan); role-play application of the problem-solving skill to a real life issue. Encourage the development of a positive problem orientation in which problems and solving them are viewed as a natural part of life and not something to be feared, despaired, or avoided; reinforce successes toward sustained, effective use.    Objective G    Patient will verbalize an understanding of healthy and unhealthy emotions with the intent of increasing the use of healthy emotions to guide actions.  Status:  Started 9/19/23, continued 12/20/23    Intervention(s): Therapist will use a process-experiential approach consistent with emotion-focused therapy to create a safe, nurturing environment in which the patient can process emotions, learning to identify and regulate unhealthy feelings and to generate more adaptive ones that then guide actions.     Goal 2: Patient will experience decrease in anxiety symptoms as evidenced by YG-7 scores.   I will know I've met my goal when I am spending more time doing things just for myself, like being outside or going for walks, and doing things, not just sitting with friends looking at social media.    12/20/23: \"This has been better. I " "hang out with my siblings and family a lot more.\" Worrying more, always worried about the next bill, how I'm gonna pay it, how I'm gonna pay for food and gas.\"     Objective A    Patient will verbalize an understanding of the cognitive, physiological, and behavioral components of anxiety and its treatment.  Status:  Started 9/19/23, continued 12/20/23    Intervention(s): Therapist will discuss how anxiety typically involves excessive worry about unrealistically appraised threats, various bodily expressions of overarousal, hypervigilance, and avoidance of what is threatening that interact to maintain the problem. Discuss how treatment targets worry, anxiety symptoms, and avoidance to help the patient manage worry effectively, reduce overarousal, eliminate unnecessary avoidance, and reengage in rewarding activities.    Objective B  Patient will verbalize an understanding of the role that thinking plays in worry, anxiety, and avoidance.  Status:  Started 9/19/23, continued 12/20/23    Intervention(s): Therapist will discuss examples demonstrating how unproductive worry typically overestimates the probability of threats and underestimates or overlooks the patient's ability to manage realistic demands. Assist the patient in analyzing worries by examining potential biases such as the probability of the negative expectation occurring, the real consequences of it occurring, ability to control the outcome, the worst possible outcome, and ability to accept it. Help the patient gain insight into the notion that worry creates acute and/or chronic anxious apprehension, leading to avoidance that precludes finding solutions to problems.    Objective C  Patient will identify, challenge, and replace biased, fearful self-talk with positive, realistic, and empowering self-talk.  Status:  Started 9/19/23, continued 12/20/23    Intervention(s): Therapist will utilize techniques from cognitive behavioral therapies including intolerance " of uncertainty and metacognitive therapies explore the patient's self-talk, underlying assumptions, schema, or metacognition that mediate anxiety; assist the patient in challenging and changing biases; conduct behavioral experiments to test biased versus unbiased predictions toward dispelling unproductive worry and increasing self-confidence in addressing the subject of worry. Assign homework exercises to identify fearful self-talk, identify biases in the self-talk, generate alternatives, and test them through behavioral experiments; review and reinforce success, providing corrective feedback toward improvement.    Objective D  Patient will learn and implement calming skills to reduce overall anxiety and manage anxiety.  Status:  Started 9/19/23, continued 12/20/23    Intervention(s): Therapist will teach calming/relaxation/mindfulness skills (e.g., applied relaxation, progressive muscle relaxation, cue controlled relaxation; mindful breathing; biofeedback) and how to discriminate better between relaxation and tension; teach the patient how to apply these skills to daily life. Assign homework in which he/she/they practice calming/relaxation/mindfulness skills, gradually applying them progressively from non-anxiety-provoking to anxiety-provoking situations; review and reinforce success; resolve obstacles toward sustained implementation.    Objective E  Patient will learn and implement problem-solving strategies for realistically addressing worries.  Status:  Started 9/19/23, continued 12/20/23    Intervention(s): Therapist will teach problem-solving/solution-finding strategies to replace unproductive worry involving specifically defining a problem, generating options for addressing it, evaluating the pros and cons of each option, selecting and implementing an action plan, and reevaluating and refining the action.    Goal 3: Patient will establish an inward sense of self-worth, confidence, and competence.    I will  know I've met my goal when I am wearing something other than sweats outside of work.    12/20/23: No change. Wears sweats a lot in winter because of cold. Will take more pride in appearance. Would like to pick at pimples less.     Objective A  Patient will increase insight into the historical and current sources of low self-esteem.  Status:  Started 9/19/23, continued 12/20/23    Intervention(s): Therapist will help patient become aware of fear of rejection and its connection with past rejection or abandonment experiences; begin to contrast past experiences of pain with present experiences of acceptance and competence. Discuss, emphasize, and interpret the patient's incidents of abuse and how they have affected feelings about self.    Objective B  Patient will decrease the verbalized fear of rejection while increasing statements of self-acceptance.  Status:  Started 9/19/23, continued 12/20/23    Intervention(s): Therapist will assign the patient to write positive affirmation statements regarding themselves and the future. Verbally reinforce the patient s use of positive statements of confidence and accomplishments.    Objective C  Patient will identify positive traits and talents about self.  Status:  Started 9/19/23, continued 12/20/23    Intervention(s): Therapist will assign patient the exercise of identifying his/her/their positive physical characteristics in a mirror to help him/her/them become more comfortable with himself/herself/themselves. Ask the patient to keep building a list of positive traits and have him/her/them read the list at the beginning and end of each session     Objective D  Patient will identify and replace negative self-talk messages used to reinforce low self-confidence.  Status:  Started 9/19/23, continued 12/20/23     Intervention(s): Therapist will help the patient identify distorted, negative beliefs about self and the world and replace these messages with more realistic, affirmative  messages. Ask the patient to complete and process self-esteem-building exercises from recommended self-help books (e.g., Ten Days to Self Esteem by Maeve; The Self-Esteem  by Haroon Haines [place], and [place]; 10 Simple Solutions for Building Self-Esteem by Mary Jane).     Objective E  Patient will identify and engage in activities that would improve self-image by being consistent with one s values.  Status:  Started 9/19/23, continued 12/20/23    Intervention(s): Therapist will help patient analyze his/her/their values and the congruence or incongruence between them and the patient s daily activities. Identify and assign activities congruent with the patient s values; process them toward improving self-concept and self-esteem.    Objective F  Patient will decrease the frequency of negative self-descriptive statements and increase frequency of positive self-descriptive statements.  Status:  Started 9/19/23, continued 12/20/23    Intervention(s): Therapist will assist the patient in becoming aware of how he/she/they express or act out negative self-feelings. Help patient reframe his/her/their negative self-assessment. Assist in developing positive self-talk as a way of boosting confidence and self-image.    Objective G    Patient will demonstrate an increased ability to identify and express personal feelings.  Status:  Started 9/19/23, continued 12/20/23    Intervention(s): Therapist will assist patient in identifying and labeling emotions.    Objective H  Patient will articulate a plan to be proactive in trying to get identified needs met.  Status:  Started 9/19/23, continued 12/20/23    Intervention(s): Therapist will assist the patient in identifying and verbalizing needs, met and unmet. Assist the patient in developing a specific action plan to get each need met.    Objective I    Patient will positively acknowledge verbal compliments from others.  Status:  Started 9/19/23, continued 12/20/23     Intervention(s): Therapist will assign patient to be aware of and acknowledge graciously (without discounting) praise and compliments from others.    Objective J  Patient will take verbal responsibility for accomplishments without discounting.  Status:  Started 9/19/23, continued 12/20/23    Intervention(s): Therapist will ask patient list accomplishments; process the integration of these into his/her/their self-image.    Patient has reviewed and agreed to the above plan.    Loree Mendenhall, LICSW  December 20, 2023

## 2024-01-14 ENCOUNTER — OFFICE VISIT (OUTPATIENT)
Dept: URGENT CARE | Facility: URGENT CARE | Age: 19
End: 2024-01-14
Payer: COMMERCIAL

## 2024-01-14 VITALS
OXYGEN SATURATION: 98 % | RESPIRATION RATE: 16 BRPM | BODY MASS INDEX: 26.66 KG/M2 | HEIGHT: 69 IN | DIASTOLIC BLOOD PRESSURE: 68 MMHG | SYSTOLIC BLOOD PRESSURE: 104 MMHG | TEMPERATURE: 98.1 F | HEART RATE: 66 BPM | WEIGHT: 180 LBS

## 2024-01-14 DIAGNOSIS — L03.211 FACIAL CELLULITIS: ICD-10-CM

## 2024-01-14 DIAGNOSIS — K04.7 DENTAL INFECTION: Primary | ICD-10-CM

## 2024-01-14 PROCEDURE — 99213 OFFICE O/P EST LOW 20 MIN: CPT | Performed by: PHYSICIAN ASSISTANT

## 2024-01-14 NOTE — PROGRESS NOTES
Assessment & Plan     1. Dental infection  - amoxicillin-clavulanate (AUGMENTIN) 875-125 MG tablet; Take 1 tablet by mouth 2 times daily for 10 days  Dispense: 20 tablet; Refill: 0    2. Facial cellulitis    Symptoms have been present for the past 1 week, and slowly been worsening.  She has tenderness on the upper gumline on the left side, along with associated facial swelling.  No erythema of her skin.  Appears to have a dental infection with facial cellulitis.  No evidence of more extensive infection into her throat or neck.  Treatment with Augmentin.  Encouraged her to take ibuprofen for pain and swelling. We discussed indications for follow-up.     Return in about 1 week (around 1/21/2024) for Dentist.    Diagnosis and treatment plan was reviewed with patient and/or family.   We went over any labs or imaging. Discussed worsening symptoms or little to no relief despite treatment plan to follow-up with PCP or return to clinic.  Patient verbalizes understanding. All questions were addressed and answered.     Violeta Blake PA-C  [facility] [place]    CHIEF COMPLAINT:   Chief Complaint   Patient presents with    Urgent Care     Facial swelling on left side and a bump on left cheek, x 1 week     Subjective     Berta is a 18 year old female who presents to clinic today for evaluation of dental pain and swelling.  Noticed having dental pain about a month ago, worsening in the past 1 week now.  Now for the past week she has also had left-sided facial swelling.      No past medical history on file.  No past surgical history on file.  Social History     Tobacco Use    Smoking status: Some Days     Types: Cigarettes    Smokeless tobacco: Never   Substance Use Topics    Alcohol use: Not on file     Current Outpatient Medications   Medication    amoxicillin-clavulanate (AUGMENTIN) 875-125 MG tablet    Acetaminophen (TYLENOL PO)    albuterol (PROAIR HFA/PROVENTIL HFA/VENTOLIN HFA) 108 (90 Base)  "MCG/ACT inhaler    EPINEPHrine (ANY BX GENERIC EQUIV) 0.3 MG/0.3ML injection 2-pack    escitalopram (LEXAPRO) 10 MG tablet    IBUPROFEN PO    norgestimate-ethinyl estradiol (ORTHO-CYCLEN) 0.25-35 MG-MCG tablet    triamcinolone (KENALOG) 0.1 % external cream     No current facility-administered medications for this visit.     Allergies   Allergen Reactions    Shrimp        10 point ROS of systems were all negative except for pertinent positives noted in my HPI.      Exam:   /68   Pulse 66   Temp 98.1  F (36.7  C) (Temporal)   Resp 16   Ht 1.753 m (5' 9\")   Wt 81.6 kg (180 lb)   SpO2 98%   BMI 26.58 kg/m    Constitutional: healthy, alert and no distress  Head: Normocephalic, atraumatic.  Eyes: conjunctiva clear, no drainage  ENT: Nasal mucosa pink and moist, throat without tonsillar hypertrophy or erythema  Neck: neck is supple, no cervical lymphadenopathy or nuchal rigidity  Dental: On the left gumline upper, she has tenderness to palpation.  No swelling noted of her gums.  She does have associated left-sided facial swelling, no erythema noted of the skin.  Cardiovascular: RRR  Respiratory: CTA bilaterally, no rhonchi or rales  Skin: no rashes  Neurologic: Speech clear, gait normal. Moves all extremities.    No results found for any visits on 01/14/24.        "

## 2024-01-14 NOTE — PATIENT INSTRUCTIONS
Start taking Augmentin for dental infection  Ibuprofen for pain and swelling  Ice to the face  Follow-up with dentist in about one week for a recheck    If you have fever, chills, worsening swelling, difficulty swallowing or breathing please follow-up right away

## 2024-01-19 ENCOUNTER — OFFICE VISIT (OUTPATIENT)
Dept: MIDWIFE SERVICES | Facility: CLINIC | Age: 19
End: 2024-01-19
Payer: COMMERCIAL

## 2024-01-19 VITALS
BODY MASS INDEX: 26.88 KG/M2 | TEMPERATURE: 97.6 F | HEART RATE: 78 BPM | SYSTOLIC BLOOD PRESSURE: 126 MMHG | DIASTOLIC BLOOD PRESSURE: 83 MMHG | WEIGHT: 182 LBS | RESPIRATION RATE: 16 BRPM

## 2024-01-19 DIAGNOSIS — Z11.8 SPECIAL SCREENING EXAMINATION FOR CHLAMYDIAL DISEASE: ICD-10-CM

## 2024-01-19 DIAGNOSIS — Z11.3 SCREENING FOR GONORRHEA: ICD-10-CM

## 2024-01-19 DIAGNOSIS — Z86.19 HISTORY OF TRICHOMONIASIS: ICD-10-CM

## 2024-01-19 DIAGNOSIS — Z11.3 SCREEN FOR STD (SEXUALLY TRANSMITTED DISEASE): Primary | ICD-10-CM

## 2024-01-19 LAB
CLUE CELLS: ABNORMAL
TRICHOMONAS, WET PREP: ABNORMAL
WBC'S/HIGH POWER FIELD, WET PREP: ABNORMAL
YEAST, WET PREP: ABNORMAL

## 2024-01-19 PROCEDURE — 87210 SMEAR WET MOUNT SALINE/INK: CPT | Performed by: ADVANCED PRACTICE MIDWIFE

## 2024-01-19 PROCEDURE — 87491 CHLMYD TRACH DNA AMP PROBE: CPT | Performed by: ADVANCED PRACTICE MIDWIFE

## 2024-01-19 PROCEDURE — 87591 N.GONORRHOEAE DNA AMP PROB: CPT | Performed by: ADVANCED PRACTICE MIDWIFE

## 2024-01-19 PROCEDURE — 99213 OFFICE O/P EST LOW 20 MIN: CPT | Performed by: ADVANCED PRACTICE MIDWIFE

## 2024-01-19 NOTE — PROGRESS NOTES
"S: Berta is here for repeat STI testing. She was positive and received treatment for trichomonas in Oct 2023, has had both GC and CT in the past. She continues to have \"bumps\" intermittently on her inner thighs, but declines physical exam today.     O:/83 (BP Location: Right arm, Patient Position: Sitting, Cuff Size: Adult Regular)   Pulse 78   Temp 97.6  F (36.4  C)   Resp 16   Wt 82.6 kg (182 lb)   LMP 01/04/2024 (Exact Date)   BMI 26.88 kg/m    Exam:  Constitutional: healthy, alert, and no distress  Psychiatric: mentation appears normal and affect normal/bright  Wet prep: neg trich    A/P:  (Z11.3) Screen for STD (sexually transmitted disease)  (primary encounter diagnosis)  Plan: Wet preparation, NEISSERIA GONORRHOEA PCR,         CHLAMYDIA TRACHOMATIS PCR     (Z86.19) History of trichomoniasis  Plan: Wet preparation    (Z11.3) Screening for gonorrhea  Plan: NEISSERIA GONORRHOEA PCR    (Z11.8) Special screening examination for chlamydial disease  Plan: CHLAMYDIA TRACHOMATIS PCR     Jose Rodríguez CNM        "

## 2024-01-20 LAB
C TRACH DNA SPEC QL NAA+PROBE: NEGATIVE
N GONORRHOEA DNA SPEC QL NAA+PROBE: NEGATIVE

## 2024-01-24 ENCOUNTER — VIRTUAL VISIT (OUTPATIENT)
Dept: PSYCHOLOGY | Facility: CLINIC | Age: 19
End: 2024-01-24
Payer: COMMERCIAL

## 2024-01-24 DIAGNOSIS — F43.23 ADJUSTMENT DISORDER WITH MIXED ANXIETY AND DEPRESSED MOOD: Primary | ICD-10-CM

## 2024-01-24 PROCEDURE — 90837 PSYTX W PT 60 MINUTES: CPT | Mod: 93 | Performed by: SOCIAL WORKER

## 2024-01-24 NOTE — PROGRESS NOTES
"  [facility]                                   Progress Note    Patient Name: Berta Oseguera  Date: 1/24/2024            Service Type: Individual  Attendees:  Patient attended alone      Session Start Time: 1508  Session End Time: 1606     Session Length: 53+ min       Session #: 13    Service Modality:  Phone Visit:      Provider verified identity through the following two step process.  Patient provided:  Patient is known previously to provider and Patient was verified at admission/transfer  Telephone Visit: The patient's condition can be safely assessed and treated via synchronous audio telemedicine encounter.    Reason for Audio Telemedicine Visit: Patient has requested telehealth visit and Patient convenience (e.g. access to timely appointments / distance to available provider)  Originating Site (Patient Location): Patient's place of employment  Distant Site (Provider Location): [facility]  Consent:  The patient/guardian has verbally consented to:   1. The potential risks and benefits of telemedicine (telephone visit) versus in person care;  The patient has been notified of the following:    \"We have found that certain health care needs can be provided without the need for a face to face visit.  This service lets us provide the care you need with a phone conversation.    I will have full access to your [facility] medical record during this entire phone call.   I will be taking notes for your medical record.   Since this is like an office visit, we will bill your insurance company for this service.    There are potential benefits and risks of telephone visits (e.g. limits to patient confidentiality) that differ from in-person visits.? Confidentiality still applies for telephone services, and nobody will record the visit.  It is important to be in a quiet, private space that is free of distractions (including cell phone or other devices) during the visit.?? " "   If during the course of the call I believe a telephone visit is not appropriate, you will not be charged for this service\"  Consent has been obtained for this service by care team member: Yes     DATA  Extended Session (53+ minutes): PROLONGED SERVICE IN THE OUTPATIENT SETTING REQUIRING DIRECT (FACE-TO-FACE) PATIENT CONTACT BEYOND THE USUAL SERVICE:    - Patient's presenting concerns require more intensive intervention than could be completed within the usual service  Interactive Complexity: No  Crisis: No      Progress Since Last Session (related to symptoms/goals/homework):   Symptoms: Worsening - increased depressive and anxiety sx    Homework: Achieved / completed to satisfaction     Episode of Care Goals: Satisfactory progress - PREPARATION (Decided to change - considering how); Intervened by negotiating a change plan and determining options / strategies for behavior change, identifying triggers, exploring social supports, and working towards setting a date to begin behavior change    Current/Ongoing Stressors and Concerns: independent, social, strong family support, financial stress, trauma hx, recently quit marijuana (9/2023)     Treatment Objective(s) Addressed in This Session:   Verbalize an accurate understanding of depression.  Verbalize an understanding of the rationale for treatment of depression.  Identify and replace thoughts and beliefs that support depression.  Increasingly verbalize hopeful and positive statements regarding self, others, and the future.  Verbalize an understanding of healthy and unhealthy emotions with the intent of increasing the use of healthy emotions to guide actions.  Verbalize an understanding of the cognitive, physiological, and behavioral components of anxiety and its treatment.  Identify, challenge, and replace biased, fearful self-talk with positive, realistic, and empowering self-talk.  Increase insight into the historical and current sources of low " self-esteem.  Decrease the verbalized fear of rejection while increasing statements of self-acceptance.  Identify positive traits and talents about self.  Identify and replace negative self-talk messages used to reinforce low self-confidence.  Identify and engage in activities that would improve self-image by being consistent with one s values.  Decrease the frequency of negative self-descriptive statements and increase frequency of positive self-descriptive statements.  Demonstrate an increased ability to identify and express personal feelings.  Articulate a plan to be proactive in trying to get identified needs met.  Positively acknowledge verbal compliments from others.  Take verbal responsibility for accomplishments without discounting.  Learn and implement:  behavioral strategies to overcome depression  problem-solving and decision-making skills  calming skills to reduce overall anxiety and manage anxiety  problem-solving strategies for realistically addressing worries    Intervention: Presents with euthymic mood, congruent affect. Endorses worsening symptoms. Provided active listening as patient reviewed and processed events since last session, including work-related frustrations and interpersonal conflict. Actively worked to build sense of self-efficacy by exploring strengths and reinforcing past successes. Utilized ACT techniques to examine strategies that have not been effective, and identify opportunities for self-empowerment within the current construct. Worked with patient to build insight into and change self-defeating beliefs and behaviors stemming from previous relationships and experiences. Worked on improving perspective taking related to previously identified maladaptive behaviors. CBT methods were utilized to challenge biases in automatic negative thoughts. Provided empathy, validation, and unconditional positive regard. Patient was active and engaged in all aspects of the session. She responded  well to the interventions and was able to use the session to make sense of her feelings and experiences.     Assessments completed prior to visit:  PHQ9:       6/21/2022    10:05 PM 9/13/2023    10:54 AM 9/27/2023     1:03 PM 10/18/2023     5:54 PM 11/15/2023    11:03 AM 12/20/2023     3:14 PM 1/24/2024    12:03 PM   PHQ-9 SCORE   PHQ-9 Total Score MyChart 19 (Moderately severe depression) 12 (Moderate depression) 13 (Moderate depression) 9 (Mild depression) 7 (Mild depression) 3 (Minimal depression) 21 (Severe depression)   PHQ-9 Total Score 19 12 13 9 7    7 3 21     GAD7:       9/13/2023    10:56 AM 9/27/2023     1:03 PM 10/18/2023     5:55 PM 11/15/2023    11:02 AM 12/20/2023     3:16 PM 1/24/2024    12:02 PM   YG-7 SCORE   Total Score 16 (severe anxiety) 20 (severe anxiety) 6 (mild anxiety) 7 (mild anxiety) 7 (mild anxiety) 15 (severe anxiety)   Total Score 16 20 6 7    7 7 15       ASSESSMENT: Current Emotional/Mental Status (status of significant symptoms):   Risk status (Self/Other harm or suicidal ideation)   Patient denies current fears or concerns for personal safety.   Patient denies current or recent suicidal ideation or behaviors.   Patient denies current or recent homicidal ideation or behaviors.   Patient denies current or recent self injurious behavior or ideation.   Patient denies other safety concerns.   Patient reports there has been no change in risk factors since their last session.     Patient reports there has been no change in protective factors since their last session.    Recommended that patient call 911 or go to the local ED should there be a change in any of these risk factors.     Appearance:   Unable to assess    Eye Contact:   Unable to assess    Psychomotor Behavior: Unable to assess    Attitude:   Cooperative  Pleasant   Orientation:   All   Speech    Rate / Production: Normal/ Responsive    Volume:  Normal    Mood:    Normal Euthymic   Affect:    Appropriate    Thought  Content:  Clear    Thought Form:  Coherent  Logical    Insight:    Fair      Medication Review: No current psychiatric medications prescribed     Medication Compliance: NA     Changes in Health Issues: None reported     Chemical Use Review:  Substance Use: Problem use continues with no change since last session, Reviewed concerns related to health related substance abuse risk  Patient declined discussion at this time      Quit marijuana September 2023; hx daily use.  Restarted marijuana late September 2023, using socially/recreationally.    Tobacco Use: No change in amount of tobacco use since last session.  Patient declined discussion at this time    Diagnosis:  1. Adjustment disorder with mixed anxiety and depressed mood      Collateral Reports Completed: Not Applicable    PLAN: Be gentler with yourself and others. Write down the things you need to do and make a plan to get them done.     Next appt(s): 2/14  Prefers Mondays/Wednesdays ([facility]).    Waitlist week of 1/29, 2/5 (Monday/Wednesday)    April JONATAN Bunn  1/24/2024                                                   ______________________________________________________________________    Individual Treatment Plan    Patient's Name: Berta Oseguera  YOB: 2005    Date of Creation: 9/19/2023   Date Treatment Plan Last Reviewed/Revised: 12/20/2023; will next review 3/19/24      DSM5 Diagnoses: Adjustment Disorders  309.28 (F43.23) With mixed anxiety and depressed mood  Psychosocial/Contextual Factors: independent, social, strong family support, financial stress, trauma hx, recently quit marijuana (9/2023)  PROMIS (reviewed every 90 days): PROMIS-10 Scores      9/13/2023    11:11 AM 12/20/2023     3:19 PM   PROMIS-10 Total Score w/o Sub Scores   PROMIS TOTAL - SUBSCORES 20 25      Referral/Collaboration: Referral to another professional/service is not indicated at this time.    Anticipated number of session for this episode of  "care: 9-12 sessions  Anticipation frequency of session: Weekly  Anticipated Duration of each session: 38-52 minutes  Treatment plan will be reviewed in 90 days or when goals have been changed.     Measurable Treatment Goal(s) related to diagnosis/functional impairment(s)    \"To gain self-love and self-confidence again, talk about a lot of stuff that's been bothering me, to build a bonding relationship with you [therapist].\"     Goal 1: Patient will experience decrease in depressive symptoms as evidenced by PHQ-9 scores.    I will know I've met my goal when I have better conversations about my life and what I'm doing, and am less sad and irritable.   12/20/23: \"Marci the same. I'm less irritable, but still .... I\"m just always worried.\"     Objective A    Patient will verbalize an accurate understanding of depression.  Status:  Started 9/19/23, continued 12/20/23    Intervention(s): Therapist will, consistent with the treatment model, discuss how cognitive, behavioral, interpersonal, and/or other factors (e.g. family history) contribute to depression.     Objective B  Patient will verbalize an understanding of the rationale for treatment of depression.  Status:  Started 9/19/23, continued 12/20/23    Intervention(s): Therapist will, consistent with the treatment model, discuss how change in cognitive, behavioral, interpersonal, and/or other factors can help alleviate depression and return to previous level of effective functioning.     Objective C    Patient will identify and replace thoughts and beliefs that support depression.  Status:  Started 9/19/23, continued 12/20/23    Intervention(s): Therapist will conduct cognitive-behavioral therapy, first conveying the connection among cognition, depressive feelings, and actions. Assign the patient to self-monitor thoughts, feelings, and actions in a journal; process the journal material to identify, challenge, and change depressive thinking patterns and replace them " with reality-based thoughts. Identify, challenge, and change depressive thinking patterns and replace them with reality-based thoughts. Assign  behavioral experiments  in and outside of sessions that test depressive automatic thoughts and beliefs against more reality-based ones toward a sustained shift reflecting hopefulness, motivation, confidence, and an improved self-concept. Facilitate and reinforce the patient's shift from biased depressive self-talk and beliefs to reality-based alternatives that enhance emotion regulation and increase adaptive functioning. Explore and restructure underlying assumptions and schema reflected in biased self-talk that may place the patient at risk for relapse or recurrence; help build the patient's self-concept from unlovable, worthless, helpless, or incompetent to empowering alternatives.    Objective D  Patient will learn and implement behavioral strategies to overcome depression.  Status:  Started 9/19/23, continued 12/20/23     Intervention(s): Therapist will engage the patient in  behavioral activation,  increasing his/her/their activity level and contact with sources of reward, while identifying processes that inhibit or obstruct activation; use instruction, rehearsal, role-playing, role reversal, as needed, to facilitate activity in the patient's daily life; reinforce success, problem-solve obstacles. Assist the patient in developing skills that increase the likelihood of deriving pleasure and meaning from behavioral activation (e.g., assertiveness skills, developing an exercise plan, less internal/more external focus, increased social involvement); reinforce success; problem-solve obstacles toward sustained, rewarding activation.    Objective E  Patient will increasingly verbalize hopeful and positive statements regarding self, others, and the future.  Status:  Started 9/19/23, continued 12/20/23    Intervention(s): Therapist will teach more about depression and how to  "recognize and accept some sadness as a normal variation in feeling. Assign the patient to write positive affirmation statements regarding themselves and the future.      Objective F  Patient will learn and implement problem-solving and decision-making skills.                     Status:  Started 9/19/23, continued 12/20/23     Intervention(s): Therapist will conduct problem-solving therapy using techniques such as psychoeducation, modeling, and role-playing to teach patient problem-solving skills (i.e., defining a problem specifically, generating possible solutions, evaluating the pros and cons of each solution, selecting and implementing a plan of action, evaluating the efficacy of the plan, accepting or revising the plan); accepting or revising the plan); role-play application of the problem-solving skill to a real life issue. Encourage the development of a positive problem orientation in which problems and solving them are viewed as a natural part of life and not something to be feared, despaired, or avoided; reinforce successes toward sustained, effective use.    Objective G    Patient will verbalize an understanding of healthy and unhealthy emotions with the intent of increasing the use of healthy emotions to guide actions.  Status:  Started 9/19/23, continued 12/20/23    Intervention(s): Therapist will use a process-experiential approach consistent with emotion-focused therapy to create a safe, nurturing environment in which the patient can process emotions, learning to identify and regulate unhealthy feelings and to generate more adaptive ones that then guide actions.     Goal 2: Patient will experience decrease in anxiety symptoms as evidenced by YG-7 scores.   I will know I've met my goal when I am spending more time doing things just for myself, like being outside or going for walks, and doing things, not just sitting with friends looking at social media.    12/20/23: \"This has been better. I hang out " "with my siblings and family a lot more.\" Worrying more, always worried about the next bill, how I'm gonna pay it, how I'm gonna pay for food and gas.\"     Objective A    Patient will verbalize an understanding of the cognitive, physiological, and behavioral components of anxiety and its treatment.  Status:  Started 9/19/23, continued 12/20/23    Intervention(s): Therapist will discuss how anxiety typically involves excessive worry about unrealistically appraised threats, various bodily expressions of overarousal, hypervigilance, and avoidance of what is threatening that interact to maintain the problem. Discuss how treatment targets worry, anxiety symptoms, and avoidance to help the patient manage worry effectively, reduce overarousal, eliminate unnecessary avoidance, and reengage in rewarding activities.    Objective B  Patient will verbalize an understanding of the role that thinking plays in worry, anxiety, and avoidance.  Status:  Started 9/19/23, continued 12/20/23    Intervention(s): Therapist will discuss examples demonstrating how unproductive worry typically overestimates the probability of threats and underestimates or overlooks the patient's ability to manage realistic demands. Assist the patient in analyzing worries by examining potential biases such as the probability of the negative expectation occurring, the real consequences of it occurring, ability to control the outcome, the worst possible outcome, and ability to accept it. Help the patient gain insight into the notion that worry creates acute and/or chronic anxious apprehension, leading to avoidance that precludes finding solutions to problems.    Objective C  Patient will identify, challenge, and replace biased, fearful self-talk with positive, realistic, and empowering self-talk.  Status:  Started 9/19/23, continued 12/20/23    Intervention(s): Therapist will utilize techniques from cognitive behavioral therapies including intolerance of " uncertainty and metacognitive therapies explore the patient's self-talk, underlying assumptions, schema, or metacognition that mediate anxiety; assist the patient in challenging and changing biases; conduct behavioral experiments to test biased versus unbiased predictions toward dispelling unproductive worry and increasing self-confidence in addressing the subject of worry. Assign homework exercises to identify fearful self-talk, identify biases in the self-talk, generate alternatives, and test them through behavioral experiments; review and reinforce success, providing corrective feedback toward improvement.    Objective D  Patient will learn and implement calming skills to reduce overall anxiety and manage anxiety.  Status:  Started 9/19/23, continued 12/20/23    Intervention(s): Therapist will teach calming/relaxation/mindfulness skills (e.g., applied relaxation, progressive muscle relaxation, cue controlled relaxation; mindful breathing; biofeedback) and how to discriminate better between relaxation and tension; teach the patient how to apply these skills to daily life. Assign homework in which he/she/they practice calming/relaxation/mindfulness skills, gradually applying them progressively from non-anxiety-provoking to anxiety-provoking situations; review and reinforce success; resolve obstacles toward sustained implementation.    Objective E  Patient will learn and implement problem-solving strategies for realistically addressing worries.  Status:  Started 9/19/23, continued 12/20/23    Intervention(s): Therapist will teach problem-solving/solution-finding strategies to replace unproductive worry involving specifically defining a problem, generating options for addressing it, evaluating the pros and cons of each option, selecting and implementing an action plan, and reevaluating and refining the action.    Goal 3: Patient will establish an inward sense of self-worth, confidence, and competence.    I will know  I've met my goal when I am wearing something other than sweats outside of work.    12/20/23: No change. Wears sweats a lot in winter because of cold. Will take more pride in appearance. Would like to pick at pimples less.     Objective A  Patient will increase insight into the historical and current sources of low self-esteem.  Status:  Started 9/19/23, continued 12/20/23    Intervention(s): Therapist will help patient become aware of fear of rejection and its connection with past rejection or abandonment experiences; begin to contrast past experiences of pain with present experiences of acceptance and competence. Discuss, emphasize, and interpret the patient's incidents of abuse and how they have affected feelings about self.    Objective B  Patient will decrease the verbalized fear of rejection while increasing statements of self-acceptance.  Status:  Started 9/19/23, continued 12/20/23    Intervention(s): Therapist will assign the patient to write positive affirmation statements regarding themselves and the future. Verbally reinforce the patient s use of positive statements of confidence and accomplishments.    Objective C  Patient will identify positive traits and talents about self.  Status:  Started 9/19/23, continued 12/20/23    Intervention(s): Therapist will assign patient the exercise of identifying his/her/their positive physical characteristics in a mirror to help him/her/them become more comfortable with himself/herself/themselves. Ask the patient to keep building a list of positive traits and have him/her/them read the list at the beginning and end of each session     Objective D  Patient will identify and replace negative self-talk messages used to reinforce low self-confidence.  Status:  Started 9/19/23, continued 12/20/23     Intervention(s): Therapist will help the patient identify distorted, negative beliefs about self and the world and replace these messages with more realistic, affirmative  messages. Ask the patient to complete and process self-esteem-building exercises from recommended self-help books (e.g., Ten Days to Self Esteem by Maeve; The Self-Esteem  by Haroon Haines [place], and [place]; 10 Simple Solutions for Building Self-Esteem by Mary Jane).     Objective E  Patient will identify and engage in activities that would improve self-image by being consistent with one s values.  Status:  Started 9/19/23, continued 12/20/23    Intervention(s): Therapist will help patient analyze his/her/their values and the congruence or incongruence between them and the patient s daily activities. Identify and assign activities congruent with the patient s values; process them toward improving self-concept and self-esteem.    Objective F  Patient will decrease the frequency of negative self-descriptive statements and increase frequency of positive self-descriptive statements.  Status:  Started 9/19/23, continued 12/20/23    Intervention(s): Therapist will assist the patient in becoming aware of how he/she/they express or act out negative self-feelings. Help patient reframe his/her/their negative self-assessment. Assist in developing positive self-talk as a way of boosting confidence and self-image.    Objective G    Patient will demonstrate an increased ability to identify and express personal feelings.  Status:  Started 9/19/23, continued 12/20/23    Intervention(s): Therapist will assist patient in identifying and labeling emotions.    Objective H  Patient will articulate a plan to be proactive in trying to get identified needs met.  Status:  Started 9/19/23, continued 12/20/23    Intervention(s): Therapist will assist the patient in identifying and verbalizing needs, met and unmet. Assist the patient in developing a specific action plan to get each need met.    Objective I    Patient will positively acknowledge verbal compliments from others.  Status:  Started 9/19/23, continued 12/20/23     Intervention(s): Therapist will assign patient to be aware of and acknowledge graciously (without discounting) praise and compliments from others.    Objective J  Patient will take verbal responsibility for accomplishments without discounting.  Status:  Started 9/19/23, continued 12/20/23    Intervention(s): Therapist will ask patient list accomplishments; process the integration of these into his/her/their self-image.    Patient has reviewed and agreed to the above plan.    Loree Mendenhall, LICSW  December 20, 2023

## 2024-01-31 ENCOUNTER — VIRTUAL VISIT (OUTPATIENT)
Dept: PSYCHOLOGY | Facility: CLINIC | Age: 19
End: 2024-01-31
Payer: COMMERCIAL

## 2024-01-31 DIAGNOSIS — F43.23 ADJUSTMENT DISORDER WITH MIXED ANXIETY AND DEPRESSED MOOD: Primary | ICD-10-CM

## 2024-01-31 PROCEDURE — 90837 PSYTX W PT 60 MINUTES: CPT | Mod: 93 | Performed by: SOCIAL WORKER

## 2024-02-01 NOTE — PROGRESS NOTES
"  [facility]                                   Progress Note    Patient Name: Berta Oseguera  Date: 1/31/2024             Service Type: Individual  Attendees:  Patient attended alone      Session Start Time: 1813  Session End Time: 1915     Session Length: 53+ min       Session #: 14    Service Modality:  Phone Visit:      Provider verified identity through the following two step process.  Patient provided:  Patient is known previously to provider and Patient was verified at admission/transfer  Telephone Visit: The patient's condition can be safely assessed and treated via synchronous audio telemedicine encounter.    Reason for Audio Telemedicine Visit: Patient has requested telehealth visit and Patient convenience (e.g. access to timely appointments / distance to available provider)  Originating Site (Patient Location): Patient's home  Distant Site (Provider Location): [facility]  Consent:  The patient/guardian has verbally consented to:   1. The potential risks and benefits of telemedicine (telephone visit) versus in person care;  The patient has been notified of the following:    \"We have found that certain health care needs can be provided without the need for a face to face visit.  This service lets us provide the care you need with a phone conversation.    I will have full access to your [facility] medical record during this entire phone call.   I will be taking notes for your medical record.   Since this is like an office visit, we will bill your insurance company for this service.    There are potential benefits and risks of telephone visits (e.g. limits to patient confidentiality) that differ from in-person visits.? Confidentiality still applies for telephone services, and nobody will record the visit.  It is important to be in a quiet, private space that is free of distractions (including cell phone or other devices) during the visit.??    If during " "the course of the call I believe a telephone visit is not appropriate, you will not be charged for this service\"  Consent has been obtained for this service by care team member: Yes     DATA  Extended Session (53+ minutes): PROLONGED SERVICE IN THE OUTPATIENT SETTING REQUIRING DIRECT (FACE-TO-FACE) PATIENT CONTACT BEYOND THE USUAL SERVICE:    - Patient's presenting concerns require more intensive intervention than could be completed within the usual service  Interactive Complexity: No  Crisis: No      Progress Since Last Session (related to symptoms/goals/homework):   Symptoms: No change - stable    Homework: Achieved / completed to satisfaction     Episode of Care Goals: Satisfactory progress - PREPARATION (Decided to change - considering how); Intervened by negotiating a change plan and determining options / strategies for behavior change, identifying triggers, exploring social supports, and working towards setting a date to begin behavior change    Current/Ongoing Stressors and Concerns: independent, social, strong family support, financial stress, trauma hx, recently quit marijuana (9/2023)     Treatment Objective(s) Addressed in This Session:   Verbalize an accurate understanding of depression.  Verbalize an understanding of the rationale for treatment of depression.  Identify and replace thoughts and beliefs that support depression.  Increasingly verbalize hopeful and positive statements regarding self, others, and the future.  Verbalize an understanding of healthy and unhealthy emotions with the intent of increasing the use of healthy emotions to guide actions.  Verbalize an understanding of the cognitive, physiological, and behavioral components of anxiety and its treatment.  Identify, challenge, and replace biased, fearful self-talk with positive, realistic, and empowering self-talk.  Increase insight into the historical and current sources of low self-esteem.  Decrease the verbalized fear of rejection " while increasing statements of self-acceptance.  Identify positive traits and talents about self.  Identify and replace negative self-talk messages used to reinforce low self-confidence.  Identify and engage in activities that would improve self-image by being consistent with one s values.  Decrease the frequency of negative self-descriptive statements and increase frequency of positive self-descriptive statements.  Demonstrate an increased ability to identify and express personal feelings.  Articulate a plan to be proactive in trying to get identified needs met.  Positively acknowledge verbal compliments from others.  Take verbal responsibility for accomplishments without discounting.  Learn and implement:  behavioral strategies to overcome depression  problem-solving and decision-making skills  calming skills to reduce overall anxiety and manage anxiety  problem-solving strategies for realistically addressing worries    Intervention: Scheduled from waitlist. Presents with low mood, congruent affect. Endorses stable symptoms. Provided active listening as patient reviewed and processed events since last session, including conflict at work and in interpersonal relationships. Affirmed and reinforced reported improvement in arriving to work on time consistently this past month. Worked with patient to build insight into and change self-defeating beliefs and behaviors stemming from previous relationships and experiences. Pointed out and confronted self-defeating behavior being interpreted as a reflection of projecting feelings onto others. REBT strategies employed to identify and dispute irrational thoughts contributing to distress, and to generate rationale, balanced alternatives. Provided empathy, validation, and unconditional positive regard. Patient was active and engaged in all aspects of the session.     Assessments completed prior to visit:  PHQ9:       6/21/2022    10:05 PM 9/13/2023    10:54 AM 9/27/2023      1:03 PM 10/18/2023     5:54 PM 11/15/2023    11:03 AM 12/20/2023     3:14 PM 1/24/2024    12:03 PM   PHQ-9 SCORE   PHQ-9 Total Score MyChart 19 (Moderately severe depression) 12 (Moderate depression) 13 (Moderate depression) 9 (Mild depression) 7 (Mild depression) 3 (Minimal depression) 21 (Severe depression)   PHQ-9 Total Score 19 12 13 9 7    7 3 21     GAD7:       9/13/2023    10:56 AM 9/27/2023     1:03 PM 10/18/2023     5:55 PM 11/15/2023    11:02 AM 12/20/2023     3:16 PM 1/24/2024    12:02 PM   YG-7 SCORE   Total Score 16 (severe anxiety) 20 (severe anxiety) 6 (mild anxiety) 7 (mild anxiety) 7 (mild anxiety) 15 (severe anxiety)   Total Score 16 20 6 7    7 7 15       ASSESSMENT: Current Emotional/Mental Status (status of significant symptoms):   Risk status (Self/Other harm or suicidal ideation)   Patient denies current fears or concerns for personal safety.   Patient denies current or recent suicidal ideation or behaviors.   Patient denies current or recent homicidal ideation or behaviors.   Patient denies current or recent self injurious behavior or ideation.   Patient denies other safety concerns.   Patient reports there has been no change in risk factors since their last session.     Patient reports there has been no change in protective factors since their last session.    Recommended that patient call 911 or go to the local ED should there be a change in any of these risk factors.     Appearance:   Unable to assess    Eye Contact:   Unable to assess    Psychomotor Behavior: Unable to assess    Attitude:   Cooperative  Pleasant   Orientation:   All   Speech    Rate / Production: Normal/ Responsive    Volume:  Normal    Mood:    Normal Euthymic   Affect:    Appropriate    Thought Content:  Clear    Thought Form:  Coherent  Logical    Insight:    Fair      Medication Review: No current psychiatric medications prescribed     Medication Compliance: NA     Changes in Health Issues: None reported     Chemical  Use Review:  Substance Use: Problem use continues with no change since last session, Reviewed concerns related to health related substance abuse risk  Patient declined discussion at this time      Quit marijuana September 2023; hx daily use.  Restarted marijuana late September 2023, using socially/recreationally.    Tobacco Use: No change in amount of tobacco use since last session.  Patient declined discussion at this time    Diagnosis:  1. Adjustment disorder with mixed anxiety and depressed mood      Collateral Reports Completed: Not Applicable    PLAN: Remember not everyone can do the things you do or do them as well as you do. Be kind to yourself and others.     Next appt(s): 2/14  Prefers Mondays/Wednesdays ([facility]).    Waitlist week of 2/5 (Monday/Wednesday)    April JONATAN Bunn  1/31/2024                                                    ______________________________________________________________________    Individual Treatment Plan    Patient's Name: Berta Oseguera  YOB: 2005    Date of Creation: 9/19/2023   Date Treatment Plan Last Reviewed/Revised: 12/20/2023; will next review 3/19/24      DSM5 Diagnoses: Adjustment Disorders  309.28 (F43.23) With mixed anxiety and depressed mood  Psychosocial/Contextual Factors: independent, social, strong family support, financial stress, trauma hx, recently quit marijuana (9/2023)  PROMIS (reviewed every 90 days): PROMIS-10 Scores      9/13/2023    11:11 AM 12/20/2023     3:19 PM   PROMIS-10 Total Score w/o Sub Scores   PROMIS TOTAL - SUBSCORES 20 25      Referral/Collaboration: Referral to another professional/service is not indicated at this time.    Anticipated number of session for this episode of care: 9-12 sessions  Anticipation frequency of session: Weekly  Anticipated Duration of each session: 38-52 minutes  Treatment plan will be reviewed in 90 days or when goals have been changed.     Measurable Treatment Goal(s) related  "to diagnosis/functional impairment(s)    \"To gain self-love and self-confidence again, talk about a lot of stuff that's been bothering me, to build a bonding relationship with you [therapist].\"     Goal 1: Patient will experience decrease in depressive symptoms as evidenced by PHQ-9 scores.    I will know I've met my goal when I have better conversations about my life and what I'm doing, and am less sad and irritable.   12/20/23: \"Marci the same. I'm less irritable, but still .... I\"m just always worried.\"     Objective A    Patient will verbalize an accurate understanding of depression.  Status:  Started 9/19/23, continued 12/20/23    Intervention(s): Therapist will, consistent with the treatment model, discuss how cognitive, behavioral, interpersonal, and/or other factors (e.g. family history) contribute to depression.     Objective B  Patient will verbalize an understanding of the rationale for treatment of depression.  Status:  Started 9/19/23, continued 12/20/23    Intervention(s): Therapist will, consistent with the treatment model, discuss how change in cognitive, behavioral, interpersonal, and/or other factors can help alleviate depression and return to previous level of effective functioning.     Objective C    Patient will identify and replace thoughts and beliefs that support depression.  Status:  Started 9/19/23, continued 12/20/23    Intervention(s): Therapist will conduct cognitive-behavioral therapy, first conveying the connection among cognition, depressive feelings, and actions. Assign the patient to self-monitor thoughts, feelings, and actions in a journal; process the journal material to identify, challenge, and change depressive thinking patterns and replace them with reality-based thoughts. Identify, challenge, and change depressive thinking patterns and replace them with reality-based thoughts. Assign  behavioral experiments  in and outside of sessions that test depressive automatic thoughts " and beliefs against more reality-based ones toward a sustained shift reflecting hopefulness, motivation, confidence, and an improved self-concept. Facilitate and reinforce the patient's shift from biased depressive self-talk and beliefs to reality-based alternatives that enhance emotion regulation and increase adaptive functioning. Explore and restructure underlying assumptions and schema reflected in biased self-talk that may place the patient at risk for relapse or recurrence; help build the patient's self-concept from unlovable, worthless, helpless, or incompetent to empowering alternatives.    Objective D  Patient will learn and implement behavioral strategies to overcome depression.  Status:  Started 9/19/23, continued 12/20/23     Intervention(s): Therapist will engage the patient in  behavioral activation,  increasing his/her/their activity level and contact with sources of reward, while identifying processes that inhibit or obstruct activation; use instruction, rehearsal, role-playing, role reversal, as needed, to facilitate activity in the patient's daily life; reinforce success, problem-solve obstacles. Assist the patient in developing skills that increase the likelihood of deriving pleasure and meaning from behavioral activation (e.g., assertiveness skills, developing an exercise plan, less internal/more external focus, increased social involvement); reinforce success; problem-solve obstacles toward sustained, rewarding activation.    Objective E  Patient will increasingly verbalize hopeful and positive statements regarding self, others, and the future.  Status:  Started 9/19/23, continued 12/20/23    Intervention(s): Therapist will teach more about depression and how to recognize and accept some sadness as a normal variation in feeling. Assign the patient to write positive affirmation statements regarding themselves and the future.      Objective F  Patient will learn and implement problem-solving and  "decision-making skills.                     Status:  Started 9/19/23, continued 12/20/23     Intervention(s): Therapist will conduct problem-solving therapy using techniques such as psychoeducation, modeling, and role-playing to teach patient problem-solving skills (i.e., defining a problem specifically, generating possible solutions, evaluating the pros and cons of each solution, selecting and implementing a plan of action, evaluating the efficacy of the plan, accepting or revising the plan); accepting or revising the plan); role-play application of the problem-solving skill to a real life issue. Encourage the development of a positive problem orientation in which problems and solving them are viewed as a natural part of life and not something to be feared, despaired, or avoided; reinforce successes toward sustained, effective use.    Objective G    Patient will verbalize an understanding of healthy and unhealthy emotions with the intent of increasing the use of healthy emotions to guide actions.  Status:  Started 9/19/23, continued 12/20/23    Intervention(s): Therapist will use a process-experiential approach consistent with emotion-focused therapy to create a safe, nurturing environment in which the patient can process emotions, learning to identify and regulate unhealthy feelings and to generate more adaptive ones that then guide actions.     Goal 2: Patient will experience decrease in anxiety symptoms as evidenced by YG-7 scores.   I will know I've met my goal when I am spending more time doing things just for myself, like being outside or going for walks, and doing things, not just sitting with friends looking at social media.    12/20/23: \"This has been better. I hang out with my siblings and family a lot more.\" Worrying more, always worried about the next bill, how I'm gonna pay it, how I'm gonna pay for food and gas.\"     Objective A    Patient will verbalize an understanding of the cognitive, " physiological, and behavioral components of anxiety and its treatment.  Status:  Started 9/19/23, continued 12/20/23    Intervention(s): Therapist will discuss how anxiety typically involves excessive worry about unrealistically appraised threats, various bodily expressions of overarousal, hypervigilance, and avoidance of what is threatening that interact to maintain the problem. Discuss how treatment targets worry, anxiety symptoms, and avoidance to help the patient manage worry effectively, reduce overarousal, eliminate unnecessary avoidance, and reengage in rewarding activities.    Objective B  Patient will verbalize an understanding of the role that thinking plays in worry, anxiety, and avoidance.  Status:  Started 9/19/23, continued 12/20/23    Intervention(s): Therapist will discuss examples demonstrating how unproductive worry typically overestimates the probability of threats and underestimates or overlooks the patient's ability to manage realistic demands. Assist the patient in analyzing worries by examining potential biases such as the probability of the negative expectation occurring, the real consequences of it occurring, ability to control the outcome, the worst possible outcome, and ability to accept it. Help the patient gain insight into the notion that worry creates acute and/or chronic anxious apprehension, leading to avoidance that precludes finding solutions to problems.    Objective C  Patient will identify, challenge, and replace biased, fearful self-talk with positive, realistic, and empowering self-talk.  Status:  Started 9/19/23, continued 12/20/23    Intervention(s): Therapist will utilize techniques from cognitive behavioral therapies including intolerance of uncertainty and metacognitive therapies explore the patient's self-talk, underlying assumptions, schema, or metacognition that mediate anxiety; assist the patient in challenging and changing biases; conduct behavioral experiments to  test biased versus unbiased predictions toward dispelling unproductive worry and increasing self-confidence in addressing the subject of worry. Assign homework exercises to identify fearful self-talk, identify biases in the self-talk, generate alternatives, and test them through behavioral experiments; review and reinforce success, providing corrective feedback toward improvement.    Objective D  Patient will learn and implement calming skills to reduce overall anxiety and manage anxiety.  Status:  Started 9/19/23, continued 12/20/23    Intervention(s): Therapist will teach calming/relaxation/mindfulness skills (e.g., applied relaxation, progressive muscle relaxation, cue controlled relaxation; mindful breathing; biofeedback) and how to discriminate better between relaxation and tension; teach the patient how to apply these skills to daily life. Assign homework in which he/she/they practice calming/relaxation/mindfulness skills, gradually applying them progressively from non-anxiety-provoking to anxiety-provoking situations; review and reinforce success; resolve obstacles toward sustained implementation.    Objective E  Patient will learn and implement problem-solving strategies for realistically addressing worries.  Status:  Started 9/19/23, continued 12/20/23    Intervention(s): Therapist will teach problem-solving/solution-finding strategies to replace unproductive worry involving specifically defining a problem, generating options for addressing it, evaluating the pros and cons of each option, selecting and implementing an action plan, and reevaluating and refining the action.    Goal 3: Patient will establish an inward sense of self-worth, confidence, and competence.    I will know I've met my goal when I am wearing something other than sweats outside of work.    12/20/23: No change. Wears sweats a lot in winter because of cold. Will take more pride in appearance. Would like to pick at pimples less.      Objective A  Patient will increase insight into the historical and current sources of low self-esteem.  Status:  Started 9/19/23, continued 12/20/23    Intervention(s): Therapist will help patient become aware of fear of rejection and its connection with past rejection or abandonment experiences; begin to contrast past experiences of pain with present experiences of acceptance and competence. Discuss, emphasize, and interpret the patient's incidents of abuse and how they have affected feelings about self.    Objective B  Patient will decrease the verbalized fear of rejection while increasing statements of self-acceptance.  Status:  Started 9/19/23, continued 12/20/23    Intervention(s): Therapist will assign the patient to write positive affirmation statements regarding themselves and the future. Verbally reinforce the patient s use of positive statements of confidence and accomplishments.    Objective C  Patient will identify positive traits and talents about self.  Status:  Started 9/19/23, continued 12/20/23    Intervention(s): Therapist will assign patient the exercise of identifying his/her/their positive physical characteristics in a mirror to help him/her/them become more comfortable with himself/herself/themselves. Ask the patient to keep building a list of positive traits and have him/her/them read the list at the beginning and end of each session     Objective D  Patient will identify and replace negative self-talk messages used to reinforce low self-confidence.  Status:  Started 9/19/23, continued 12/20/23     Intervention(s): Therapist will help the patient identify distorted, negative beliefs about self and the world and replace these messages with more realistic, affirmative messages. Ask the patient to complete and process self-esteem-building exercises from recommended self-help books (e.g., Ten Days to Self Esteem by Maeve; The Self-Esteem  by Haroon Haines [place], and Ayad; 10  Simple Solutions for Building Self-Esteem by Tamia.     Objective E  Patient will identify and engage in activities that would improve self-image by being consistent with one s values.  Status:  Started 9/19/23, continued 12/20/23    Intervention(s): Therapist will help patient analyze his/her/their values and the congruence or incongruence between them and the patient s daily activities. Identify and assign activities congruent with the patient s values; process them toward improving self-concept and self-esteem.    Objective F  Patient will decrease the frequency of negative self-descriptive statements and increase frequency of positive self-descriptive statements.  Status:  Started 9/19/23, continued 12/20/23    Intervention(s): Therapist will assist the patient in becoming aware of how he/she/they express or act out negative self-feelings. Help patient reframe his/her/their negative self-assessment. Assist in developing positive self-talk as a way of boosting confidence and self-image.    Objective G    Patient will demonstrate an increased ability to identify and express personal feelings.  Status:  Started 9/19/23, continued 12/20/23    Intervention(s): Therapist will assist patient in identifying and labeling emotions.    Objective H  Patient will articulate a plan to be proactive in trying to get identified needs met.  Status:  Started 9/19/23, continued 12/20/23    Intervention(s): Therapist will assist the patient in identifying and verbalizing needs, met and unmet. Assist the patient in developing a specific action plan to get each need met.    Objective I    Patient will positively acknowledge verbal compliments from others.  Status:  Started 9/19/23, continued 12/20/23    Intervention(s): Therapist will assign patient to be aware of and acknowledge graciously (without discounting) praise and compliments from others.    Objective J  Patient will take verbal responsibility for accomplishments without  discounting.  Status:  Started 9/19/23, continued 12/20/23    Intervention(s): Therapist will ask patient list accomplishments; process the integration of these into his/her/their self-image.    Patient has reviewed and agreed to the above plan.    Loree Mendenhall, LICSW  December 20, 2023

## 2024-02-05 ENCOUNTER — TELEPHONE (OUTPATIENT)
Dept: PSYCHOLOGY | Facility: CLINIC | Age: 19
End: 2024-02-05
Payer: COMMERCIAL

## 2024-02-05 NOTE — TELEPHONE ENCOUNTER
Contacted from waitlist to offer appointment this afternoon. Left VM requesting return call if interested/available.     JONATAN Knight on 2/5/2024 at 3:12 PM

## 2024-02-06 ENCOUNTER — VIRTUAL VISIT (OUTPATIENT)
Dept: PSYCHOLOGY | Facility: CLINIC | Age: 19
End: 2024-02-06
Payer: COMMERCIAL

## 2024-02-06 DIAGNOSIS — F43.23 ADJUSTMENT DISORDER WITH MIXED ANXIETY AND DEPRESSED MOOD: Primary | ICD-10-CM

## 2024-02-06 PROCEDURE — 90834 PSYTX W PT 45 MINUTES: CPT | Mod: 93 | Performed by: SOCIAL WORKER

## 2024-02-06 NOTE — PROGRESS NOTES
"  [facility]                                   Progress Note    Patient Name: Berta Oseguera  Date: 2/6/2024              Service Type: Individual  Attendees:  Patient attended alone      Session Start Time: 1009  Session End Time: 1056     Session Length: 38 - 52 min  Session #: 15    Service Modality: Phone Visit:      Provider verified identity through the following two step process.  Patient provided:  Patient is known previously to provider and Patient was verified at admission/transfer  Telephone Visit: The patient's condition can be safely assessed and treated via synchronous audio telemedicine encounter.    Reason for Audio Telemedicine Visit: Patient has requested telehealth visit and Patient convenience (e.g. access to timely appointments / distance to available provider)  Originating Site (Patient Location): Patient's home  Distant Site (Provider Location): Provider Remote Setting- Home Office  Consent:  The patient/guardian has verbally consented to:   1. The potential risks and benefits of telemedicine (telephone visit) versus in person care;  The patient has been notified of the following:    \"We have found that certain health care needs can be provided without the need for a face to face visit.  This service lets us provide the care you need with a phone conversation.    I will have full access to your [facility] medical record during this entire phone call.   I will be taking notes for your medical record.   Since this is like an office visit, we will bill your insurance company for this service.    There are potential benefits and risks of telephone visits (e.g. limits to patient confidentiality) that differ from in-person visits.? Confidentiality still applies for telephone services, and nobody will record the visit.  It is important to be in a quiet, private space that is free of distractions (including cell phone or other devices) during the visit.??    If during the " "course of the call I believe a telephone visit is not appropriate, you will not be charged for this service\"  Consent has been obtained for this service by care team member: Yes     DATA  Extended Session (53+ minutes): No  Interactive Complexity: No  Crisis: No      Progress Since Last Session (related to symptoms/goals/homework):   Symptoms: No change - stable    Homework: Achieved / completed to satisfaction     Episode of Care Goals: Satisfactory progress - PREPARATION (Decided to change - considering how); Intervened by negotiating a change plan and determining options / strategies for behavior change, identifying triggers, exploring social supports, and working towards setting a date to begin behavior change    Current/Ongoing Stressors and Concerns: independent, social, strong family support, financial stress, trauma hx, recently quit marijuana (9/2023)     Treatment Objective(s) Addressed in This Session:   Verbalize an accurate understanding of depression.  Verbalize an understanding of the rationale for treatment of depression.  Identify and replace thoughts and beliefs that support depression.  Increasingly verbalize hopeful and positive statements regarding self, others, and the future.  Verbalize an understanding of healthy and unhealthy emotions with the intent of increasing the use of healthy emotions to guide actions.  Verbalize an understanding of the cognitive, physiological, and behavioral components of anxiety and its treatment.  Identify, challenge, and replace biased, fearful self-talk with positive, realistic, and empowering self-talk.  Increase insight into the historical and current sources of low self-esteem.  Decrease the verbalized fear of rejection while increasing statements of self-acceptance.  Identify positive traits and talents about self.  Identify and replace negative self-talk messages used to reinforce low self-confidence.  Identify and engage in activities that would improve " self-image by being consistent with one s values.  Decrease the frequency of negative self-descriptive statements and increase frequency of positive self-descriptive statements.  Demonstrate an increased ability to identify and express personal feelings.  Articulate a plan to be proactive in trying to get identified needs met.  Positively acknowledge verbal compliments from others.  Take verbal responsibility for accomplishments without discounting.  Learn and implement:  behavioral strategies to overcome depression  problem-solving and decision-making skills  calming skills to reduce overall anxiety and manage anxiety  problem-solving strategies for realistically addressing worries    Intervention: Scheduled from waitlist. Presents with euthymic mood, congruent affect. Endorses stable symptoms. Provided active listening as patient shared about frustrations with friends and at work. Empathy and Socratic questions employed to explore recent upsetting events, negative thoughts, and negative emotions related to interpersonal relationships and interactions. REBT strategies employed to identify and dispute irrational thoughts contributing to distress, and to generate rationale, balanced alternatives. Assisted patient in resolving interpersonal conflicts (e.g., using problem-solving, assertiveness training, adaptive communication habits, etc.). Discussed importance of not letting feelings drive actions; actions driven from negative emotions leading to regret, possibly guilt, making one more susceptible to sacrificing needs or boundaries in maladaptive attempt to make reparations. Provided empathy, validation, and unconditional positive regard. Patient was openly engaged. She responded well to the interventions and was able to use the session to make sense of her feelings and experiences.     Assessments completed prior to visit:  PHQ9:       6/21/2022    10:05 PM 9/13/2023    10:54 AM 9/27/2023     1:03 PM 10/18/2023      5:54 PM 11/15/2023    11:03 AM 12/20/2023     3:14 PM 1/24/2024    12:03 PM   PHQ-9 SCORE   PHQ-9 Total Score MyChart 19 (Moderately severe depression) 12 (Moderate depression) 13 (Moderate depression) 9 (Mild depression) 7 (Mild depression) 3 (Minimal depression) 21 (Severe depression)   PHQ-9 Total Score 19 12 13 9 7    7 3 21     GAD7:       9/13/2023    10:56 AM 9/27/2023     1:03 PM 10/18/2023     5:55 PM 11/15/2023    11:02 AM 12/20/2023     3:16 PM 1/24/2024    12:02 PM   YG-7 SCORE   Total Score 16 (severe anxiety) 20 (severe anxiety) 6 (mild anxiety) 7 (mild anxiety) 7 (mild anxiety) 15 (severe anxiety)   Total Score 16 20 6 7    7 7 15       ASSESSMENT: Current Emotional/Mental Status (status of significant symptoms):   Risk status (Self/Other harm or suicidal ideation)   Patient denies current fears or concerns for personal safety.   Patient denies current or recent suicidal ideation or behaviors.   Patient denies current or recent homicidal ideation or behaviors.   Patient denies current or recent self injurious behavior or ideation.   Patient denies other safety concerns.   Patient reports there has been no change in risk factors since their last session.     Patient reports there has been no change in protective factors since their last session.    Recommended that patient call 911 or go to the local ED should there be a change in any of these risk factors.     Appearance:   Unable to assess    Eye Contact:   Unable to assess    Psychomotor Behavior: Unable to assess    Attitude:   Cooperative  Pleasant   Orientation:   All   Speech    Rate / Production: Normal/ Responsive    Volume:  Normal    Mood:    Normal Euthymic   Affect:    Appropriate    Thought Content:  Clear    Thought Form:  Coherent  Logical    Insight:    Fair      Medication Review: No current psychiatric medications prescribed     Medication Compliance: NA     Changes in Health Issues: None reported     Chemical Use Review:  Substance  "Use: Problem use continues with no change since last session, Reviewed concerns related to health related substance abuse risk  Patient declined discussion at this time      Quit marijuana September 2023; hx daily use.  Restarted marijuana late September 2023, using socially/recreationally.    Tobacco Use: Yes, decrease.  Patient reports frequency of use abstaining. Provided support and affirmation for steps taken towards quiting     Diagnosis:  1. Adjustment disorder with mixed anxiety and depressed mood      Collateral Reports Completed: Not Applicable    PLAN: Reflect on what you see as your role in the current conflicts.      Next appt(s): 2/14  Prefers Mondays/Wednesdays ([facility]).    April ALMAS JONATAN Mendenhall  2/6/2024                                            ______________________________________________________________________    Individual Treatment Plan    Patient's Name: Berta Oseguera  YOB: 2005    Date of Creation: 9/19/2023   Date Treatment Plan Last Reviewed/Revised: 12/20/2023; will next review 3/19/24      DSM5 Diagnoses: Adjustment Disorders  309.28 (F43.23) With mixed anxiety and depressed mood  Psychosocial/Contextual Factors: independent, social, strong family support, financial stress, trauma hx, recently quit marijuana (9/2023)  PROMIS (reviewed every 90 days): PROMIS-10 Scores      9/13/2023    11:11 AM 12/20/2023     3:19 PM   PROMIS-10 Total Score w/o Sub Scores   PROMIS TOTAL - SUBSCORES 20 25      Referral/Collaboration: Referral to another professional/service is not indicated at this time.    Anticipated number of session for this episode of care: 9-12 sessions  Anticipation frequency of session: Weekly  Anticipated Duration of each session: 38-52 minutes  Treatment plan will be reviewed in 90 days or when goals have been changed.     Measurable Treatment Goal(s) related to diagnosis/functional impairment(s)    \"To gain self-love and self-confidence again, talk " "about a lot of stuff that's been bothering me, to build a bonding relationship with you [therapist].\"     Goal 1: Patient will experience decrease in depressive symptoms as evidenced by PHQ-9 scores.    I will know I've met my goal when I have better conversations about my life and what I'm doing, and am less sad and irritable.   12/20/23: \"Marci the same. I'm less irritable, but still .... I\"m just always worried.\"     Objective A    Patient will verbalize an accurate understanding of depression.  Status:  Started 9/19/23, continued 12/20/23    Intervention(s): Therapist will, consistent with the treatment model, discuss how cognitive, behavioral, interpersonal, and/or other factors (e.g. family history) contribute to depression.     Objective B  Patient will verbalize an understanding of the rationale for treatment of depression.  Status:  Started 9/19/23, continued 12/20/23    Intervention(s): Therapist will, consistent with the treatment model, discuss how change in cognitive, behavioral, interpersonal, and/or other factors can help alleviate depression and return to previous level of effective functioning.     Objective C    Patient will identify and replace thoughts and beliefs that support depression.  Status:  Started 9/19/23, continued 12/20/23    Intervention(s): Therapist will conduct cognitive-behavioral therapy, first conveying the connection among cognition, depressive feelings, and actions. Assign the patient to self-monitor thoughts, feelings, and actions in a journal; process the journal material to identify, challenge, and change depressive thinking patterns and replace them with reality-based thoughts. Identify, challenge, and change depressive thinking patterns and replace them with reality-based thoughts. Assign  behavioral experiments  in and outside of sessions that test depressive automatic thoughts and beliefs against more reality-based ones toward a sustained shift reflecting hopefulness, " motivation, confidence, and an improved self-concept. Facilitate and reinforce the patient's shift from biased depressive self-talk and beliefs to reality-based alternatives that enhance emotion regulation and increase adaptive functioning. Explore and restructure underlying assumptions and schema reflected in biased self-talk that may place the patient at risk for relapse or recurrence; help build the patient's self-concept from unlovable, worthless, helpless, or incompetent to empowering alternatives.    Objective D  Patient will learn and implement behavioral strategies to overcome depression.  Status:  Started 9/19/23, continued 12/20/23     Intervention(s): Therapist will engage the patient in  behavioral activation,  increasing his/her/their activity level and contact with sources of reward, while identifying processes that inhibit or obstruct activation; use instruction, rehearsal, role-playing, role reversal, as needed, to facilitate activity in the patient's daily life; reinforce success, problem-solve obstacles. Assist the patient in developing skills that increase the likelihood of deriving pleasure and meaning from behavioral activation (e.g., assertiveness skills, developing an exercise plan, less internal/more external focus, increased social involvement); reinforce success; problem-solve obstacles toward sustained, rewarding activation.    Objective E  Patient will increasingly verbalize hopeful and positive statements regarding self, others, and the future.  Status:  Started 9/19/23, continued 12/20/23    Intervention(s): Therapist will teach more about depression and how to recognize and accept some sadness as a normal variation in feeling. Assign the patient to write positive affirmation statements regarding themselves and the future.      Objective F  Patient will learn and implement problem-solving and decision-making skills.                     Status:  Started 9/19/23, continued 12/20/23    "  Intervention(s): Therapist will conduct problem-solving therapy using techniques such as psychoeducation, modeling, and role-playing to teach patient problem-solving skills (i.e., defining a problem specifically, generating possible solutions, evaluating the pros and cons of each solution, selecting and implementing a plan of action, evaluating the efficacy of the plan, accepting or revising the plan); accepting or revising the plan); role-play application of the problem-solving skill to a real life issue. Encourage the development of a positive problem orientation in which problems and solving them are viewed as a natural part of life and not something to be feared, despaired, or avoided; reinforce successes toward sustained, effective use.    Objective G    Patient will verbalize an understanding of healthy and unhealthy emotions with the intent of increasing the use of healthy emotions to guide actions.  Status:  Started 9/19/23, continued 12/20/23    Intervention(s): Therapist will use a process-experiential approach consistent with emotion-focused therapy to create a safe, nurturing environment in which the patient can process emotions, learning to identify and regulate unhealthy feelings and to generate more adaptive ones that then guide actions.     Goal 2: Patient will experience decrease in anxiety symptoms as evidenced by YG-7 scores.   I will know I've met my goal when I am spending more time doing things just for myself, like being outside or going for walks, and doing things, not just sitting with friends looking at social media.    12/20/23: \"This has been better. I hang out with my siblings and family a lot more.\" Worrying more, always worried about the next bill, how I'm gonna pay it, how I'm gonna pay for food and gas.\"     Objective A    Patient will verbalize an understanding of the cognitive, physiological, and behavioral components of anxiety and its treatment.  Status:  Started 9/19/23, " continued 12/20/23    Intervention(s): Therapist will discuss how anxiety typically involves excessive worry about unrealistically appraised threats, various bodily expressions of overarousal, hypervigilance, and avoidance of what is threatening that interact to maintain the problem. Discuss how treatment targets worry, anxiety symptoms, and avoidance to help the patient manage worry effectively, reduce overarousal, eliminate unnecessary avoidance, and reengage in rewarding activities.    Objective B  Patient will verbalize an understanding of the role that thinking plays in worry, anxiety, and avoidance.  Status:  Started 9/19/23, continued 12/20/23    Intervention(s): Therapist will discuss examples demonstrating how unproductive worry typically overestimates the probability of threats and underestimates or overlooks the patient's ability to manage realistic demands. Assist the patient in analyzing worries by examining potential biases such as the probability of the negative expectation occurring, the real consequences of it occurring, ability to control the outcome, the worst possible outcome, and ability to accept it. Help the patient gain insight into the notion that worry creates acute and/or chronic anxious apprehension, leading to avoidance that precludes finding solutions to problems.    Objective C  Patient will identify, challenge, and replace biased, fearful self-talk with positive, realistic, and empowering self-talk.  Status:  Started 9/19/23, continued 12/20/23    Intervention(s): Therapist will utilize techniques from cognitive behavioral therapies including intolerance of uncertainty and metacognitive therapies explore the patient's self-talk, underlying assumptions, schema, or metacognition that mediate anxiety; assist the patient in challenging and changing biases; conduct behavioral experiments to test biased versus unbiased predictions toward dispelling unproductive worry and increasing  self-confidence in addressing the subject of worry. Assign homework exercises to identify fearful self-talk, identify biases in the self-talk, generate alternatives, and test them through behavioral experiments; review and reinforce success, providing corrective feedback toward improvement.    Objective D  Patient will learn and implement calming skills to reduce overall anxiety and manage anxiety.  Status:  Started 9/19/23, continued 12/20/23    Intervention(s): Therapist will teach calming/relaxation/mindfulness skills (e.g., applied relaxation, progressive muscle relaxation, cue controlled relaxation; mindful breathing; biofeedback) and how to discriminate better between relaxation and tension; teach the patient how to apply these skills to daily life. Assign homework in which he/she/they practice calming/relaxation/mindfulness skills, gradually applying them progressively from non-anxiety-provoking to anxiety-provoking situations; review and reinforce success; resolve obstacles toward sustained implementation.    Objective E  Patient will learn and implement problem-solving strategies for realistically addressing worries.  Status:  Started 9/19/23, continued 12/20/23    Intervention(s): Therapist will teach problem-solving/solution-finding strategies to replace unproductive worry involving specifically defining a problem, generating options for addressing it, evaluating the pros and cons of each option, selecting and implementing an action plan, and reevaluating and refining the action.    Goal 3: Patient will establish an inward sense of self-worth, confidence, and competence.    I will know I've met my goal when I am wearing something other than sweats outside of work.    12/20/23: No change. Wears sweats a lot in winter because of cold. Will take more pride in appearance. Would like to pick at pimples less.     Objective A  Patient will increase insight into the historical and current sources of low  self-esteem.  Status:  Started 9/19/23, continued 12/20/23    Intervention(s): Therapist will help patient become aware of fear of rejection and its connection with past rejection or abandonment experiences; begin to contrast past experiences of pain with present experiences of acceptance and competence. Discuss, emphasize, and interpret the patient's incidents of abuse and how they have affected feelings about self.    Objective B  Patient will decrease the verbalized fear of rejection while increasing statements of self-acceptance.  Status:  Started 9/19/23, continued 12/20/23    Intervention(s): Therapist will assign the patient to write positive affirmation statements regarding themselves and the future. Verbally reinforce the patient s use of positive statements of confidence and accomplishments.    Objective C  Patient will identify positive traits and talents about self.  Status:  Started 9/19/23, continued 12/20/23    Intervention(s): Therapist will assign patient the exercise of identifying his/her/their positive physical characteristics in a mirror to help him/her/them become more comfortable with himself/herself/themselves. Ask the patient to keep building a list of positive traits and have him/her/them read the list at the beginning and end of each session     Objective D  Patient will identify and replace negative self-talk messages used to reinforce low self-confidence.  Status:  Started 9/19/23, continued 12/20/23     Intervention(s): Therapist will help the patient identify distorted, negative beliefs about self and the world and replace these messages with more realistic, affirmative messages. Ask the patient to complete and process self-esteem-building exercises from recommended self-help books (e.g., Ten Days to Self Esteem by Maeve; The Self-Esteem  by Haroon Haines [place], and Ayad; 10 Simple Solutions for Building Self-Esteem by Mary Jane).     Objective E  Patient will  identify and engage in activities that would improve self-image by being consistent with one s values.  Status:  Started 9/19/23, continued 12/20/23    Intervention(s): Therapist will help patient analyze his/her/their values and the congruence or incongruence between them and the patient s daily activities. Identify and assign activities congruent with the patient s values; process them toward improving self-concept and self-esteem.    Objective F  Patient will decrease the frequency of negative self-descriptive statements and increase frequency of positive self-descriptive statements.  Status:  Started 9/19/23, continued 12/20/23    Intervention(s): Therapist will assist the patient in becoming aware of how he/she/they express or act out negative self-feelings. Help patient reframe his/her/their negative self-assessment. Assist in developing positive self-talk as a way of boosting confidence and self-image.    Objective G    Patient will demonstrate an increased ability to identify and express personal feelings.  Status:  Started 9/19/23, continued 12/20/23    Intervention(s): Therapist will assist patient in identifying and labeling emotions.    Objective H  Patient will articulate a plan to be proactive in trying to get identified needs met.  Status:  Started 9/19/23, continued 12/20/23    Intervention(s): Therapist will assist the patient in identifying and verbalizing needs, met and unmet. Assist the patient in developing a specific action plan to get each need met.    Objective I    Patient will positively acknowledge verbal compliments from others.  Status:  Started 9/19/23, continued 12/20/23    Intervention(s): Therapist will assign patient to be aware of and acknowledge graciously (without discounting) praise and compliments from others.    Objective J  Patient will take verbal responsibility for accomplishments without discounting.  Status:  Started 9/19/23, continued 12/20/23    Intervention(s):  Therapist will ask patient list accomplishments; process the integration of these into his/her/their self-image.    Patient has reviewed and agreed to the above plan.    Loree Mendenhall, LICSW  December 20, 2023

## 2024-02-13 ENCOUNTER — VIRTUAL VISIT (OUTPATIENT)
Dept: PSYCHOLOGY | Facility: CLINIC | Age: 19
End: 2024-02-13
Payer: COMMERCIAL

## 2024-02-13 DIAGNOSIS — F43.23 ADJUSTMENT DISORDER WITH MIXED ANXIETY AND DEPRESSED MOOD: Primary | ICD-10-CM

## 2024-02-13 PROCEDURE — 90834 PSYTX W PT 45 MINUTES: CPT | Mod: 93 | Performed by: SOCIAL WORKER

## 2024-02-13 NOTE — PROGRESS NOTES
"  [facility]                                   Progress Note    Patient Name: Berta Oseguera  Date: 2/13/2024              Service Type: Individual  Attendees:  Patient attended alone      Session Start Time: 0700  Session End Time: 0743     Session Length: 38 - 52 min  Session #: 16    Service Modality: Phone Visit:      Provider verified identity through the following two step process.  Patient provided:  Patient is known previously to provider and Patient was verified at admission/transfer  Telephone Visit: The patient's condition can be safely assessed and treated via synchronous audio telemedicine encounter.    Reason for Audio Telemedicine Visit: Patient has requested telehealth visit and Patient convenience (e.g. access to timely appointments / distance to available provider)  Originating Site (Patient Location): Patient's home  Distant Site (Provider Location): Provider Remote Setting- Home Office  Consent:  The patient/guardian has verbally consented to:   1. The potential risks and benefits of telemedicine (telephone visit) versus in person care;  The patient has been notified of the following:    \"We have found that certain health care needs can be provided without the need for a face to face visit.  This service lets us provide the care you need with a phone conversation.    I will have full access to your [facility] medical record during this entire phone call.   I will be taking notes for your medical record.   Since this is like an office visit, we will bill your insurance company for this service.    There are potential benefits and risks of telephone visits (e.g. limits to patient confidentiality) that differ from in-person visits.? Confidentiality still applies for telephone services, and nobody will record the visit.  It is important to be in a quiet, private space that is free of distractions (including cell phone or other devices) during the visit.??    If during the " "course of the call I believe a telephone visit is not appropriate, you will not be charged for this service\"  Consent has been obtained for this service by care team member: Yes     DATA  Extended Session (53+ minutes): No  Interactive Complexity: No  Crisis: No      Progress Since Last Session (related to symptoms/goals/homework):   Symptoms: No change - stable    Homework: Achieved / completed to satisfaction     Episode of Care Goals: Satisfactory progress - PREPARATION (Decided to change - considering how); Intervened by negotiating a change plan and determining options / strategies for behavior change, identifying triggers, exploring social supports, and working towards setting a date to begin behavior change    Current/Ongoing Stressors and Concerns: independent, social, strong family support, financial stress, trauma hx, recently quit marijuana (9/2023)     Treatment Objective(s) Addressed in This Session:   Verbalize an accurate understanding of depression.  Verbalize an understanding of the rationale for treatment of depression.  Identify and replace thoughts and beliefs that support depression.  Increasingly verbalize hopeful and positive statements regarding self, others, and the future.  Verbalize an understanding of healthy and unhealthy emotions with the intent of increasing the use of healthy emotions to guide actions.  Verbalize an understanding of the cognitive, physiological, and behavioral components of anxiety and its treatment.  Identify, challenge, and replace biased, fearful self-talk with positive, realistic, and empowering self-talk.  Increase insight into the historical and current sources of low self-esteem.  Decrease the verbalized fear of rejection while increasing statements of self-acceptance.  Identify positive traits and talents about self.  Identify and replace negative self-talk messages used to reinforce low self-confidence.  Identify and engage in activities that would improve " self-image by being consistent with one s values.  Decrease the frequency of negative self-descriptive statements and increase frequency of positive self-descriptive statements.  Demonstrate an increased ability to identify and express personal feelings.  Articulate a plan to be proactive in trying to get identified needs met.  Positively acknowledge verbal compliments from others.  Take verbal responsibility for accomplishments without discounting.  Learn and implement:  behavioral strategies to overcome depression  problem-solving and decision-making skills  calming skills to reduce overall anxiety and manage anxiety  problem-solving strategies for realistically addressing worries    Intervention: Presents with euthymic mood, congruent affect. Endorses stable symptoms. Shares that her mood has improved some since last week, and attributes this to people not pissing her off. 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 Provided active listening as patient shared about relationship with mother, and aspects of the relationship that are more difficult. Affirmed and reinforced reported assertiveness in addressing issues. Discussed how people do not always behave the way we wish they would or believe they should, and that a natural consequence of that is often distancing. Explored additional ways of caring for self in challenging relationships. Shares that she finds sessions helpful, and would thinks it would be helpful for her mother to see a therapist. Provided empathy, validation, and unconditional positive regard. Patient was openly engaged. She responded well to the interventions and was able to use the session to make sense of her feelings and experiences.     Assessments completed prior to visit:  PHQ9:       6/21/2022    10:05 PM 9/13/2023    10:54 AM 9/27/2023     1:03 PM 10/18/2023     5:54 PM 11/15/2023    11:03 AM 12/20/2023     3:14 PM 1/24/2024    12:03 PM    PHQ-9 SCORE   PHQ-9 Total Score [facility] 19 (Moderately severe depression) 12 (Moderate depression) 13 (Moderate depression) 9 (Mild depression) 7 (Mild depression) 3 (Minimal depression) 21 (Severe depression)   PHQ-9 Total Score 19 12 13 9 7    7 3 21     GAD7:       9/13/2023    10:56 AM 9/27/2023     1:03 PM 10/18/2023     5:55 PM 11/15/2023    11:02 AM 12/20/2023     3:16 PM 1/24/2024    12:02 PM   YG-7 SCORE   Total Score 16 (severe anxiety) 20 (severe anxiety) 6 (mild anxiety) 7 (mild anxiety) 7 (mild anxiety) 15 (severe anxiety)   Total Score 16 20 6 7    7 7 15       ASSESSMENT: Current Emotional/Mental Status (status of significant symptoms):   Risk status (Self/Other harm or suicidal ideation)   Patient denies current fears or concerns for personal safety.   Patient denies current or recent suicidal ideation or behaviors.   Patient denies current or recent homicidal ideation or behaviors.   Patient denies current or recent self injurious behavior or ideation.   Patient denies other safety concerns.   Patient reports there has been no change in risk factors since their last session.     Patient reports there has been no change in protective factors since their last session.    Recommended that patient call 911 or go to the local ED should there be a change in any of these risk factors.     Appearance:   Unable to assess    Eye Contact:   Unable to assess    Psychomotor Behavior: Unable to assess    Attitude:   Cooperative  Pleasant   Orientation:   All   Speech    Rate / Production: Normal/ Responsive    Volume:  Normal    Mood:    Normal Euthymic   Affect:    Appropriate    Thought Content:  Clear    Thought Form:  Coherent  Logical    Insight:    Fair      Medication Review: No current psychiatric medications prescribed     Medication Compliance: NA     Changes in Health Issues: None reported     Chemical Use Review:  Substance Use: Problem use continues with no change since last session, Reviewed  "concerns related to health related substance abuse risk  Patient declined discussion at this time      Quit marijuana September 2023; hx daily use.  Restarted marijuana late September 2023, using socially/recreationally.    Tobacco Use: Yes, decrease.  Patient reports frequency of use abstaining. Provided support and affirmation for steps taken towards quiting     Diagnosis:  1. Adjustment disorder with mixed anxiety and depressed mood      Collateral Reports Completed: Not Applicable    PLAN: Prefer vs. Must. Self-schedule.       Next appt(s): self-schedule; waitlist in the interim  Prefers Mondays/Wednesdays ([facility]).    April ALMAS Abdirashid [facility]  2/13/2024                                            ______________________________________________________________________    Individual Treatment Plan    Patient's Name: Berta Oseguera  YOB: 2005    Date of Creation: 9/19/2023   Date Treatment Plan Last Reviewed/Revised: 12/20/2023; will next review 3/19/24      DSM5 Diagnoses: Adjustment Disorders  309.28 (F43.23) With mixed anxiety and depressed mood  Psychosocial/Contextual Factors: independent, social, strong family support, financial stress, trauma hx, recently quit marijuana (9/2023)  PROMIS (reviewed every 90 days): PROMIS-10 Scores      9/13/2023    11:11 AM 12/20/2023     3:19 PM   PROMIS-10 Total Score w/o Sub Scores   PROMIS TOTAL - SUBSCORES 20 25      Referral/Collaboration: Referral to another professional/service is not indicated at this time.    Anticipated number of session for this episode of care: 9-12 sessions  Anticipation frequency of session: Weekly  Anticipated Duration of each session: 38-52 minutes  Treatment plan will be reviewed in 90 days or when goals have been changed.     Measurable Treatment Goal(s) related to diagnosis/functional impairment(s)    \"To gain self-love and self-confidence again, talk about a lot of stuff that's been bothering me, to build a bonding " "relationship with you [therapist].\"     Goal 1: Patient will experience decrease in depressive symptoms as evidenced by PHQ-9 scores.    I will know I've met my goal when I have better conversations about my life and what I'm doing, and am less sad and irritable.   12/20/23: \"Marci the same. I'm less irritable, but still .... I\"m just always worried.\"     Objective A    Patient will verbalize an accurate understanding of depression.  Status:  Started 9/19/23, continued 12/20/23    Intervention(s): Therapist will, consistent with the treatment model, discuss how cognitive, behavioral, interpersonal, and/or other factors (e.g. family history) contribute to depression.     Objective B  Patient will verbalize an understanding of the rationale for treatment of depression.  Status:  Started 9/19/23, continued 12/20/23    Intervention(s): Therapist will, consistent with the treatment model, discuss how change in cognitive, behavioral, interpersonal, and/or other factors can help alleviate depression and return to previous level of effective functioning.     Objective C    Patient will identify and replace thoughts and beliefs that support depression.  Status:  Started 9/19/23, continued 12/20/23    Intervention(s): Therapist will conduct cognitive-behavioral therapy, first conveying the connection among cognition, depressive feelings, and actions. Assign the patient to self-monitor thoughts, feelings, and actions in a journal; process the journal material to identify, challenge, and change depressive thinking patterns and replace them with reality-based thoughts. Identify, challenge, and change depressive thinking patterns and replace them with reality-based thoughts. Assign  behavioral experiments  in and outside of sessions that test depressive automatic thoughts and beliefs against more reality-based ones toward a sustained shift reflecting hopefulness, motivation, confidence, and an improved self-concept. Facilitate " and reinforce the patient's shift from biased depressive self-talk and beliefs to reality-based alternatives that enhance emotion regulation and increase adaptive functioning. Explore and restructure underlying assumptions and schema reflected in biased self-talk that may place the patient at risk for relapse or recurrence; help build the patient's self-concept from unlovable, worthless, helpless, or incompetent to empowering alternatives.    Objective D  Patient will learn and implement behavioral strategies to overcome depression.  Status:  Started 9/19/23, continued 12/20/23     Intervention(s): Therapist will engage the patient in  behavioral activation,  increasing his/her/their activity level and contact with sources of reward, while identifying processes that inhibit or obstruct activation; use instruction, rehearsal, role-playing, role reversal, as needed, to facilitate activity in the patient's daily life; reinforce success, problem-solve obstacles. Assist the patient in developing skills that increase the likelihood of deriving pleasure and meaning from behavioral activation (e.g., assertiveness skills, developing an exercise plan, less internal/more external focus, increased social involvement); reinforce success; problem-solve obstacles toward sustained, rewarding activation.    Objective E  Patient will increasingly verbalize hopeful and positive statements regarding self, others, and the future.  Status:  Started 9/19/23, continued 12/20/23    Intervention(s): Therapist will teach more about depression and how to recognize and accept some sadness as a normal variation in feeling. Assign the patient to write positive affirmation statements regarding themselves and the future.      Objective F  Patient will learn and implement problem-solving and decision-making skills.                     Status:  Started 9/19/23, continued 12/20/23     Intervention(s): Therapist will conduct problem-solving therapy  "using techniques such as psychoeducation, modeling, and role-playing to teach patient problem-solving skills (i.e., defining a problem specifically, generating possible solutions, evaluating the pros and cons of each solution, selecting and implementing a plan of action, evaluating the efficacy of the plan, accepting or revising the plan); accepting or revising the plan); role-play application of the problem-solving skill to a real life issue. Encourage the development of a positive problem orientation in which problems and solving them are viewed as a natural part of life and not something to be feared, despaired, or avoided; reinforce successes toward sustained, effective use.    Objective G    Patient will verbalize an understanding of healthy and unhealthy emotions with the intent of increasing the use of healthy emotions to guide actions.  Status:  Started 9/19/23, continued 12/20/23    Intervention(s): Therapist will use a process-experiential approach consistent with emotion-focused therapy to create a safe, nurturing environment in which the patient can process emotions, learning to identify and regulate unhealthy feelings and to generate more adaptive ones that then guide actions.     Goal 2: Patient will experience decrease in anxiety symptoms as evidenced by YG-7 scores.   I will know I've met my goal when I am spending more time doing things just for myself, like being outside or going for walks, and doing things, not just sitting with friends looking at social media.    12/20/23: \"This has been better. I hang out with my siblings and family a lot more.\" Worrying more, always worried about the next bill, how I'm gonna pay it, how I'm gonna pay for food and gas.\"     Objective A    Patient will verbalize an understanding of the cognitive, physiological, and behavioral components of anxiety and its treatment.  Status:  Started 9/19/23, continued 12/20/23    Intervention(s): Therapist will discuss how " anxiety typically involves excessive worry about unrealistically appraised threats, various bodily expressions of overarousal, hypervigilance, and avoidance of what is threatening that interact to maintain the problem. Discuss how treatment targets worry, anxiety symptoms, and avoidance to help the patient manage worry effectively, reduce overarousal, eliminate unnecessary avoidance, and reengage in rewarding activities.    Objective B  Patient will verbalize an understanding of the role that thinking plays in worry, anxiety, and avoidance.  Status:  Started 9/19/23, continued 12/20/23    Intervention(s): Therapist will discuss examples demonstrating how unproductive worry typically overestimates the probability of threats and underestimates or overlooks the patient's ability to manage realistic demands. Assist the patient in analyzing worries by examining potential biases such as the probability of the negative expectation occurring, the real consequences of it occurring, ability to control the outcome, the worst possible outcome, and ability to accept it. Help the patient gain insight into the notion that worry creates acute and/or chronic anxious apprehension, leading to avoidance that precludes finding solutions to problems.    Objective C  Patient will identify, challenge, and replace biased, fearful self-talk with positive, realistic, and empowering self-talk.  Status:  Started 9/19/23, continued 12/20/23    Intervention(s): Therapist will utilize techniques from cognitive behavioral therapies including intolerance of uncertainty and metacognitive therapies explore the patient's self-talk, underlying assumptions, schema, or metacognition that mediate anxiety; assist the patient in challenging and changing biases; conduct behavioral experiments to test biased versus unbiased predictions toward dispelling unproductive worry and increasing self-confidence in addressing the subject of worry. Assign homework  exercises to identify fearful self-talk, identify biases in the self-talk, generate alternatives, and test them through behavioral experiments; review and reinforce success, providing corrective feedback toward improvement.    Objective D  Patient will learn and implement calming skills to reduce overall anxiety and manage anxiety.  Status:  Started 9/19/23, continued 12/20/23    Intervention(s): Therapist will teach calming/relaxation/mindfulness skills (e.g., applied relaxation, progressive muscle relaxation, cue controlled relaxation; mindful breathing; biofeedback) and how to discriminate better between relaxation and tension; teach the patient how to apply these skills to daily life. Assign homework in which he/she/they practice calming/relaxation/mindfulness skills, gradually applying them progressively from non-anxiety-provoking to anxiety-provoking situations; review and reinforce success; resolve obstacles toward sustained implementation.    Objective E  Patient will learn and implement problem-solving strategies for realistically addressing worries.  Status:  Started 9/19/23, continued 12/20/23    Intervention(s): Therapist will teach problem-solving/solution-finding strategies to replace unproductive worry involving specifically defining a problem, generating options for addressing it, evaluating the pros and cons of each option, selecting and implementing an action plan, and reevaluating and refining the action.    Goal 3: Patient will establish an inward sense of self-worth, confidence, and competence.    I will know I've met my goal when I am wearing something other than sweats outside of work.    12/20/23: No change. Wears sweats a lot in winter because of cold. Will take more pride in appearance. Would like to pick at pimples less.     Objective A  Patient will increase insight into the historical and current sources of low self-esteem.  Status:  Started 9/19/23, continued 12/20/23     Intervention(s): Therapist will help patient become aware of fear of rejection and its connection with past rejection or abandonment experiences; begin to contrast past experiences of pain with present experiences of acceptance and competence. Discuss, emphasize, and interpret the patient's incidents of abuse and how they have affected feelings about self.    Objective B  Patient will decrease the verbalized fear of rejection while increasing statements of self-acceptance.  Status:  Started 9/19/23, continued 12/20/23    Intervention(s): Therapist will assign the patient to write positive affirmation statements regarding themselves and the future. Verbally reinforce the patient s use of positive statements of confidence and accomplishments.    Objective C  Patient will identify positive traits and talents about self.  Status:  Started 9/19/23, continued 12/20/23    Intervention(s): Therapist will assign patient the exercise of identifying his/her/their positive physical characteristics in a mirror to help him/her/them become more comfortable with himself/herself/themselves. Ask the patient to keep building a list of positive traits and have him/her/them read the list at the beginning and end of each session     Objective D  Patient will identify and replace negative self-talk messages used to reinforce low self-confidence.  Status:  Started 9/19/23, continued 12/20/23     Intervention(s): Therapist will help the patient identify distorted, negative beliefs about self and the world and replace these messages with more realistic, affirmative messages. Ask the patient to complete and process self-esteem-building exercises from recommended self-help books (e.g., Ten Days to Self Esteem by Mcfarlane; The Self-Esteem  by Haroon Haines [place], and Ayad; 10 Simple Solutions for Building Self-Esteem by Mary Jane).     Objective E  Patient will identify and engage in activities that would improve self-image by  being consistent with one s values.  Status:  Started 9/19/23, continued 12/20/23    Intervention(s): Therapist will help patient analyze his/her/their values and the congruence or incongruence between them and the patient s daily activities. Identify and assign activities congruent with the patient s values; process them toward improving self-concept and self-esteem.    Objective F  Patient will decrease the frequency of negative self-descriptive statements and increase frequency of positive self-descriptive statements.  Status:  Started 9/19/23, continued 12/20/23    Intervention(s): Therapist will assist the patient in becoming aware of how he/she/they express or act out negative self-feelings. Help patient reframe his/her/their negative self-assessment. Assist in developing positive self-talk as a way of boosting confidence and self-image.    Objective G    Patient will demonstrate an increased ability to identify and express personal feelings.  Status:  Started 9/19/23, continued 12/20/23    Intervention(s): Therapist will assist patient in identifying and labeling emotions.    Objective H  Patient will articulate a plan to be proactive in trying to get identified needs met.  Status:  Started 9/19/23, continued 12/20/23    Intervention(s): Therapist will assist the patient in identifying and verbalizing needs, met and unmet. Assist the patient in developing a specific action plan to get each need met.    Objective I    Patient will positively acknowledge verbal compliments from others.  Status:  Started 9/19/23, continued 12/20/23    Intervention(s): Therapist will assign patient to be aware of and acknowledge graciously (without discounting) praise and compliments from others.    Objective J  Patient will take verbal responsibility for accomplishments without discounting.  Status:  Started 9/19/23, continued 12/20/23    Intervention(s): Therapist will ask patient list accomplishments; process the integration  of these into his/her/their self-image.    Patient has reviewed and agreed to the above plan.    Loree Mendenhall, [facility]  December 20, 2023

## 2024-02-18 ENCOUNTER — HOSPITAL ENCOUNTER (EMERGENCY)
Facility: CLINIC | Age: 19
Discharge: HOME | End: 2024-02-18
Attending: FAMILY MEDICINE | Admitting: FAMILY MEDICINE
Payer: COMMERCIAL

## 2024-02-18 ENCOUNTER — APPOINTMENT (OUTPATIENT)
Dept: CT IMAGING | Facility: CLINIC | Age: 19
End: 2024-02-18
Attending: FAMILY MEDICINE
Payer: COMMERCIAL

## 2024-02-18 VITALS
BODY MASS INDEX: 26.66 KG/M2 | SYSTOLIC BLOOD PRESSURE: 127 MMHG | WEIGHT: 180 LBS | DIASTOLIC BLOOD PRESSURE: 88 MMHG | TEMPERATURE: 97.6 F | HEART RATE: 74 BPM | HEIGHT: 69 IN | RESPIRATION RATE: 20 BRPM | OXYGEN SATURATION: 99 %

## 2024-02-18 DIAGNOSIS — R19.7 NAUSEA VOMITING AND DIARRHEA: ICD-10-CM

## 2024-02-18 DIAGNOSIS — R10.31 RIGHT LOWER QUADRANT ABDOMINAL PAIN: ICD-10-CM

## 2024-02-18 DIAGNOSIS — R11.2 NAUSEA VOMITING AND DIARRHEA: ICD-10-CM

## 2024-02-18 LAB
ALBUMIN SERPL BCG-MCNC: 4.8 G/DL (ref 3.5–5.2)
ALP SERPL-CCNC: 68 U/L (ref 40–150)
ALT SERPL W P-5'-P-CCNC: 15 U/L (ref 0–50)
ANION GAP SERPL CALCULATED.3IONS-SCNC: 12 MMOL/L (ref 7–15)
AST SERPL W P-5'-P-CCNC: 32 U/L (ref 0–45)
BASOPHILS # BLD AUTO: 0 10E3/UL (ref 0–0.2)
BASOPHILS NFR BLD AUTO: 0 %
BILIRUB SERPL-MCNC: 0.7 MG/DL
BUN SERPL-MCNC: 11.8 MG/DL (ref 6–20)
CALCIUM SERPL-MCNC: 9.8 MG/DL (ref 8.6–10)
CHLORIDE SERPL-SCNC: 101 MMOL/L (ref 98–107)
CREAT SERPL-MCNC: 0.6 MG/DL (ref 0.51–0.95)
DEPRECATED HCO3 PLAS-SCNC: 24 MMOL/L (ref 22–29)
EGFRCR SERPLBLD CKD-EPI 2021: >90 ML/MIN/1.73M2
EOSINOPHIL # BLD AUTO: 0.4 10E3/UL (ref 0–0.7)
EOSINOPHIL NFR BLD AUTO: 3 %
ERYTHROCYTE [DISTWIDTH] IN BLOOD BY AUTOMATED COUNT: 12.1 % (ref 10–15)
GLUCOSE SERPL-MCNC: 85 MG/DL (ref 70–99)
HCG SERPL QL: NEGATIVE
HCT VFR BLD AUTO: 45.1 % (ref 35–47)
HGB BLD-MCNC: 15.1 G/DL (ref 11.7–15.7)
IMM GRANULOCYTES # BLD: 0.1 10E3/UL
IMM GRANULOCYTES NFR BLD: 0 %
LIPASE SERPL-CCNC: 11 U/L (ref 13–60)
LYMPHOCYTES # BLD AUTO: 0.5 10E3/UL (ref 0.8–5.3)
LYMPHOCYTES NFR BLD AUTO: 4 %
MCH RBC QN AUTO: 29.2 PG (ref 26.5–33)
MCHC RBC AUTO-ENTMCNC: 33.5 G/DL (ref 31.5–36.5)
MCV RBC AUTO: 87 FL (ref 78–100)
MONOCYTES # BLD AUTO: 0.8 10E3/UL (ref 0–1.3)
MONOCYTES NFR BLD AUTO: 6 %
NEUTROPHILS # BLD AUTO: 11.4 10E3/UL (ref 1.6–8.3)
NEUTROPHILS NFR BLD AUTO: 87 %
NRBC # BLD AUTO: 0 10E3/UL
NRBC BLD AUTO-RTO: 0 /100
PLATELET # BLD AUTO: 298 10E3/UL (ref 150–450)
POTASSIUM SERPL-SCNC: 4.8 MMOL/L (ref 3.4–5.3)
PROT SERPL-MCNC: 9.2 G/DL (ref 6.4–8.3)
RBC # BLD AUTO: 5.17 10E6/UL (ref 3.8–5.2)
SODIUM SERPL-SCNC: 137 MMOL/L (ref 135–145)
WBC # BLD AUTO: 13.2 10E3/UL (ref 4–11)

## 2024-02-18 PROCEDURE — 99284 EMERGENCY DEPT VISIT MOD MDM: CPT | Performed by: FAMILY MEDICINE

## 2024-02-18 PROCEDURE — 258N000003 HC RX IP 258 OP 636: Performed by: FAMILY MEDICINE

## 2024-02-18 PROCEDURE — 84703 CHORIONIC GONADOTROPIN ASSAY: CPT | Performed by: FAMILY MEDICINE

## 2024-02-18 PROCEDURE — 96361 HYDRATE IV INFUSION ADD-ON: CPT | Performed by: FAMILY MEDICINE

## 2024-02-18 PROCEDURE — 96374 THER/PROPH/DIAG INJ IV PUSH: CPT | Performed by: FAMILY MEDICINE

## 2024-02-18 PROCEDURE — 999N000127 HC STATISTIC PERIPHERAL IV START W US GUIDANCE

## 2024-02-18 PROCEDURE — 250N000011 HC RX IP 250 OP 636: Performed by: FAMILY MEDICINE

## 2024-02-18 PROCEDURE — 99285 EMERGENCY DEPT VISIT HI MDM: CPT | Mod: 25 | Performed by: FAMILY MEDICINE

## 2024-02-18 PROCEDURE — 83690 ASSAY OF LIPASE: CPT | Performed by: FAMILY MEDICINE

## 2024-02-18 PROCEDURE — 80053 COMPREHEN METABOLIC PANEL: CPT | Performed by: FAMILY MEDICINE

## 2024-02-18 PROCEDURE — 999N000285 HC STATISTIC VASC ACCESS LAB DRAW WITH PIV START

## 2024-02-18 PROCEDURE — 74176 CT ABD & PELVIS W/O CONTRAST: CPT

## 2024-02-18 PROCEDURE — 85025 COMPLETE CBC W/AUTO DIFF WBC: CPT | Performed by: FAMILY MEDICINE

## 2024-02-18 RX ORDER — KETOROLAC TROMETHAMINE 15 MG/ML
15 INJECTION, SOLUTION INTRAMUSCULAR; INTRAVENOUS ONCE
Status: COMPLETED | OUTPATIENT
Start: 2024-02-18 | End: 2024-02-18

## 2024-02-18 RX ORDER — ONDANSETRON 4 MG/1
8 TABLET, FILM COATED ORAL EVERY 8 HOURS PRN
Qty: 10 TABLET | Refills: 0 | Status: SHIPPED | OUTPATIENT
Start: 2024-02-18

## 2024-02-18 RX ORDER — ONDANSETRON 4 MG/1
8 TABLET, ORALLY DISINTEGRATING ORAL EVERY 8 HOURS PRN
Qty: 10 TABLET | Refills: 0 | Status: SHIPPED | OUTPATIENT
Start: 2024-02-18 | End: 2024-02-18

## 2024-02-18 RX ORDER — ONDANSETRON 2 MG/ML
8 INJECTION INTRAMUSCULAR; INTRAVENOUS ONCE
Status: COMPLETED | OUTPATIENT
Start: 2024-02-18 | End: 2024-02-18

## 2024-02-18 RX ORDER — IOPAMIDOL 755 MG/ML
100 INJECTION, SOLUTION INTRAVASCULAR ONCE
Status: DISCONTINUED | OUTPATIENT
Start: 2024-02-18 | End: 2024-02-18 | Stop reason: HOSPADM

## 2024-02-18 RX ORDER — ONDANSETRON 8 MG/1
8 TABLET, ORALLY DISINTEGRATING ORAL ONCE
Status: COMPLETED | OUTPATIENT
Start: 2024-02-18 | End: 2024-02-18

## 2024-02-18 RX ADMIN — KETOROLAC TROMETHAMINE 15 MG: 15 INJECTION, SOLUTION INTRAMUSCULAR; INTRAVENOUS at 12:20

## 2024-02-18 RX ADMIN — SODIUM CHLORIDE 1000 ML: 9 INJECTION, SOLUTION INTRAVENOUS at 12:18

## 2024-02-18 RX ADMIN — ONDANSETRON 8 MG: 8 TABLET, ORALLY DISINTEGRATING ORAL at 11:19

## 2024-02-18 ASSESSMENT — ACTIVITIES OF DAILY LIVING (ADL)
ADLS_ACUITY_SCORE: 33
ADLS_ACUITY_SCORE: 35
ADLS_ACUITY_SCORE: 35

## 2024-02-18 NOTE — ED NOTES
Pt in room with N/V/D. Nursing having hard time to place IV. Vascular access consulted. Pt just got oral Zofran.

## 2024-02-18 NOTE — ED TRIAGE NOTES
Nausea, vomiting, diarrhea since last night. Stomach pain 10/10.     Triage Assessment (Adult)       Row Name 02/18/24 0935          Triage Assessment    Airway WDL WDL        Respiratory WDL    Respiratory WDL WDL        Skin Circulation/Temperature WDL    Skin Circulation/Temperature WDL WDL        Cardiac WDL    Cardiac WDL WDL        Peripheral/Neurovascular WDL    Peripheral Neurovascular WDL WDL        Cognitive/Neuro/Behavioral WDL    Cognitive/Neuro/Behavioral WDL WDL

## 2024-02-18 NOTE — DISCHARGE INSTRUCTIONS
Thank you for choosing [facility].     Please closely monitor for further symptoms. Return to the Emergency Department if you develop any new or worsening signs or symptoms.    If you received any opiate pain medications or sedatives during your visit, please do not drive for at least 8 hours.     Labs, cultures or final xray interpretations may still need to be reviewed.  We will call you if your plan of care needs to be changed.    Please follow up with your primary care physician or clinic.

## 2024-02-18 NOTE — ED PROVIDER NOTES
[facility] EMERGENCY DEPARTMENT ([facility])    2/18/24      ED PROVIDER NOTE       History     Chief Complaint   Patient presents with    Nausea, Vomiting, & Diarrhea     Nausea, vomiting, diarrhea since last night. Stomach pain 10/10. Unable t eat and drink     HPI  Berta Oseguera is a 18 year old previously healthy female who presents emergency department with complaints of nausea, vomiting, diarrhea.  Patient states that she was eating Chinese food yesterday evening (egg rolls), and after 3 hours she woke up with cramping and multiple episodes of emesis.  She has no recent sick contacts, chronic medical problems, or substance abuse concerns.  She notes generalized abdominal pain and tightness that worsens slightly before her vomiting, however does not radiate and is constant.  She has no recent antibiotics or travel.      Past Medical History  No past medical history on file.  No past surgical history on file.  ondansetron (ZOFRAN) 4 MG tablet  Acetaminophen (TYLENOL PO)  albuterol (PROAIR HFA/PROVENTIL HFA/VENTOLIN HFA) 108 (90 Base) MCG/ACT inhaler  EPINEPHrine (ANY BX GENERIC EQUIV) 0.3 MG/0.3ML injection 2-pack  escitalopram (LEXAPRO) 10 MG tablet  IBUPROFEN PO  norgestimate-ethinyl estradiol (ORTHO-CYCLEN) 0.25-35 MG-MCG tablet  triamcinolone (KENALOG) 0.1 % external cream      Allergies   Allergen Reactions    Shrimp      Family History  Family History   Problem Relation Age of Onset    Depression Mother         not diagnosed or treated    Diabetes Type 2  Mother     Heart Disease Father     Substance Abuse Father     Depression Maternal Half-Sister         medicated    Diabetes Type 1 Maternal Half-Brother     Bipolar Disorder Maternal Half-Brother         Schizoaffective    Substance Abuse Maternal Half-Brother         weed    Depression Maternal Grandmother         medicated    Breast Cancer Maternal Grandmother     Prostate Cancer Maternal Grandfather     Glaucoma No family hx  "of     Macular Degeneration No family hx of     Suicide No family hx of      Social History   Social History     Tobacco Use    Smoking status: Some Days     Types: Cigarettes     Passive exposure: Never    Smokeless tobacco: Never   Vaping Use    Vaping Use: Never used         A complete review of systems was performed with pertinent positives and negatives noted in the HPI, and all other systems negative.    Physical Exam   BP: 127/88  Pulse: 74  Temp: 97.6  F (36.4  C)  Resp: 20  Height: 175.3 cm (5' 9\")  Weight: 81.6 kg (180 lb)  SpO2: 99 %  Physical Exam  Vitals and nursing note reviewed.   Constitutional:       General: She is in acute distress (Appears very uncomfortable).      Appearance: Normal appearance. She is not diaphoretic.   HENT:      Head: Atraumatic.      Mouth/Throat:      Mouth: Mucous membranes are dry.   Eyes:      General: No scleral icterus.     Conjunctiva/sclera: Conjunctivae normal.   Cardiovascular:      Rate and Rhythm: Normal rate.      Heart sounds: Normal heart sounds.   Pulmonary:      Effort: No respiratory distress.      Breath sounds: Normal breath sounds.   Abdominal:      General: Abdomen is flat.      Tenderness: There is abdominal tenderness in the right lower quadrant, epigastric area and left upper quadrant. There is no guarding or rebound.   Musculoskeletal:      Cervical back: Neck supple.   Skin:     General: Skin is warm.      Findings: No rash.   Neurological:      Mental Status: She is alert.           ED Course, Procedures, & Data      Procedures                  Results for orders placed or performed during the hospital encounter of 02/18/24   CT Abdomen Pelvis w/o Contrast     Status: None    Narrative    EXAM: CT ABDOMEN PELVIS W/O CONTRAST  LOCATION: [facility]  DATE: 2/18/2024    INDICATION: Right lower quadrant pain, leukocytosis, evaluate for appendicitis.  COMPARISON: None.  TECHNIQUE: CT scan of the abdomen and " pelvis was performed without IV contrast. Multiplanar reformats were obtained. Dose reduction techniques were used.  CONTRAST: None.    FINDINGS:   LOWER CHEST: Normal.    HEPATOBILIARY: Normal.    PANCREAS: Normal.    SPLEEN: Normal.    ADRENAL GLANDS: Normal.    KIDNEYS/BLADDER: Contrast seen in the renal collecting system. No hydroureteronephrosis.    BOWEL: Normal appendix. No obstruction, colitis, diverticulitis, or appendicitis. Nonspecific fluid distended loops of small and large bowel, correlate to exclude diarrhea and gastroenteritis.    LYMPH NODES: Normal.    VASCULATURE: Unremarkable.    PELVIC ORGANS: Normal.    MUSCULOSKELETAL: Normal.      Impression    IMPRESSION:   1.  Normal appendix. No bowel obstruction, colitis, or diverticulitis.  2.  Nonspecific fluid distended loops of small and large bowel, correlate to exclude gastroenteritis and diarrhea.  3.  No hydroureteronephrosis.     Comprehensive metabolic panel     Status: Abnormal   Result Value Ref Range    Sodium 137 135 - 145 mmol/L    Potassium 4.8 3.4 - 5.3 mmol/L    Carbon Dioxide (CO2) 24 22 - 29 mmol/L    Anion Gap 12 7 - 15 mmol/L    Urea Nitrogen 11.8 6.0 - 20.0 mg/dL    Creatinine 0.60 0.51 - 0.95 mg/dL    GFR Estimate >90 >60 mL/min/1.73m2    Calcium 9.8 8.6 - 10.0 mg/dL    Chloride 101 98 - 107 mmol/L    Glucose 85 70 - 99 mg/dL    Alkaline Phosphatase 68 40 - 150 U/L    AST 32 0 - 45 U/L    ALT 15 0 - 50 U/L    Protein Total 9.2 (H) 6.4 - 8.3 g/dL    Albumin 4.8 3.5 - 5.2 g/dL    Bilirubin Total 0.7 <=1.2 mg/dL   Lipase     Status: Abnormal   Result Value Ref Range    Lipase 11 (L) 13 - 60 U/L   HCG qualitative Blood     Status: Normal   Result Value Ref Range    hCG Serum Qualitative Negative Negative   CBC with platelets and differential     Status: Abnormal   Result Value Ref Range    WBC Count 13.2 (H) 4.0 - 11.0 10e3/uL    RBC Count 5.17 3.80 - 5.20 10e6/uL    Hemoglobin 15.1 11.7 - 15.7 g/dL    Hematocrit 45.1 35.0 - 47.0 %     MCV 87 78 - 100 fL    MCH 29.2 26.5 - 33.0 pg    MCHC 33.5 31.5 - 36.5 g/dL    RDW 12.1 10.0 - 15.0 %    Platelet Count 298 150 - 450 10e3/uL    % Neutrophils 87 %    % Lymphocytes 4 %    % Monocytes 6 %    % Eosinophils 3 %    % Basophils 0 %    % Immature Granulocytes 0 %    NRBCs per 100 WBC 0 <1 /100    Absolute Neutrophils 11.4 (H) 1.6 - 8.3 10e3/uL    Absolute Lymphocytes 0.5 (L) 0.8 - 5.3 10e3/uL    Absolute Monocytes 0.8 0.0 - 1.3 10e3/uL    Absolute Eosinophils 0.4 0.0 - 0.7 10e3/uL    Absolute Basophils 0.0 0.0 - 0.2 10e3/uL    Absolute Immature Granulocytes 0.1 <=0.4 10e3/uL    Absolute NRBCs 0.0 10e3/uL   CBC with platelets differential     Status: Abnormal    Narrative    The following orders were created for panel order CBC with platelets differential.  Procedure                               Abnormality         Status                     ---------                               -----------         ------                     CBC with platelets and d...[[id number]]  Abnormal            Final result                 Please view results for these tests on the individual orders.     Medications   iopamidol (ISOVUE-370) solution 100 mL (has no administration in time range)   sodium chloride 0.9 % bag 100mL (has no administration in time range)   sodium chloride 0.9% BOLUS 1,000 mL (0 mLs Intravenous Stopped 2/18/24 1320)   ondansetron (ZOFRAN) injection 8 mg (8 mg Intravenous Not Given 2/18/24 1345)   ketorolac (TORADOL) injection 15 mg (15 mg Intravenous $Given 2/18/24 1220)   ondansetron (ZOFRAN ODT) ODT tab 8 mg (8 mg Oral $Given 2/18/24 1119)     Labs Ordered and Resulted from Time of ED Arrival to Time of ED Departure   COMPREHENSIVE METABOLIC PANEL - Abnormal       Result Value    Sodium 137      Potassium 4.8      Carbon Dioxide (CO2) 24      Anion Gap 12      Urea Nitrogen 11.8      Creatinine 0.60      GFR Estimate >90      Calcium 9.8      Chloride 101      Glucose 85      Alkaline Phosphatase 68       AST 32      ALT 15      Protein Total 9.2 (*)     Albumin 4.8      Bilirubin Total 0.7     LIPASE - Abnormal    Lipase 11 (*)    CBC WITH PLATELETS AND DIFFERENTIAL - Abnormal    WBC Count 13.2 (*)     RBC Count 5.17      Hemoglobin 15.1      Hematocrit 45.1      MCV 87      MCH 29.2      MCHC 33.5      RDW 12.1      Platelet Count 298      % Neutrophils 87      % Lymphocytes 4      % Monocytes 6      % Eosinophils 3      % Basophils 0      % Immature Granulocytes 0      NRBCs per 100 WBC 0      Absolute Neutrophils 11.4 (*)     Absolute Lymphocytes 0.5 (*)     Absolute Monocytes 0.8      Absolute Eosinophils 0.4      Absolute Basophils 0.0      Absolute Immature Granulocytes 0.1      Absolute NRBCs 0.0     HCG QUALITATIVE PREGNANCY - Normal    hCG Serum Qualitative Negative     ENTERIC BACTERIA AND VIRUS PANEL BY PCR     CT Abdomen Pelvis w/o Contrast   Final Result   IMPRESSION:    1.  Normal appendix. No bowel obstruction, colitis, or diverticulitis.   2.  Nonspecific fluid distended loops of small and large bowel, correlate to exclude gastroenteritis and diarrhea.   3.  No hydroureteronephrosis.                Critical care was not performed.     Medical Decision Making  The patient's presentation was of moderate complexity (an acute illness with systemic symptoms).    The patient's evaluation involved:  ordering and/or review of 3+ test(s) in this encounter (see separate area of note for details)  independent interpretation of testing performed by another health professional (CT abdomen pelvis images personally reviewed and reviewed radiologist interpretation)    The patient's management necessitated moderate risk (prescription drug management including medications given in the ED).    Assessment & Plan    Previously healthy 18-year-old female with acute onset of abdominal pain vomiting and diarrhea, on exam most remarkable in the right lower quadrant and epigastric area.  Initial differential diagnosis  included but was not restricted to acute appendicitis, excess gastroenteritis or foodborne illness, pancreatitis, ischemic bowel, diverticulitis, ureterolithiasis,small bowel obstruction, pyelonephritis, slightly ovarian torsion, ovarian cyst, tubal ovarian abscess, PID, ectopic pregnancy, and other life threatening as well as nonlife threatening causes of vomiting/diarrhea, epigastric and right lower abdominal pain.  Vitals normal.  Patient appearing very uncomfortable.  Tender in the epigastrium and the right lower quadrant without guarding rebound or peritoneal signs.  No pelvic pain.  Labs reveal leukocytosis.  Patient is not pregnant.  Electrolytes LFTs and lipase normal.  Symptoms improving but still has persistent right lower quadrant pain.  A CT abdomen pelvis was entertained, this shows no significant acute pathology.  Patient now reporting feeling better, does not have a surgical abdomen is taking p.o. without difficulty.  Symptoms most likely related to infectious foodborne or viral gastroenteritis.  We discussed the indications for emergency department return and follow-up.  Stable for discharge.      I have reviewed the nursing notes. I have reviewed the findings, diagnosis, plan and need for follow up with the patient.    Current Discharge Medication List        START taking these medications    Details   ondansetron (ZOFRAN) 4 MG tablet Take 2 tablets (8 mg) by mouth every 8 hours as needed for nausea or vomiting  Qty: 10 tablet, Refills: 0             Final diagnoses:   Right lower quadrant abdominal pain   Nausea vomiting and diarrhea       Yony Landrum, am serving as a trained medical scribe to document services personally performed by Олег Pelaez MD based on the provider's statements to me on February 18, 2024.  This document has been checked and approved by the attending provider.    I, Олег Pelaez MD, was physically present and have reviewed and verified the accuracy of  this note documented by Yony Blanco, medical scribe.      Олег Pelaez MD   [facility] EMERGENCY DEPARTMENT  2/18/2024     Олег Pelaez MD  02/18/24 [id number]

## 2024-03-04 ENCOUNTER — TELEPHONE (OUTPATIENT)
Dept: PSYCHOLOGY | Facility: CLINIC | Age: 19
End: 2024-03-04
Payer: COMMERCIAL

## 2024-03-04 NOTE — TELEPHONE ENCOUNTER
Contacted from waitlist to offer appointment for 3/6 at 1000. Left VM advising that this appointment would be scheduled and requesting patient to callback to confirm.     JONATAN Knight on 3/4/2024 at 5:12 PM     Received callback from patient who confirms appointment for 3/6 at 1000.     JONATAN Knight on 3/4/2024 at 5:17 PM

## 2024-03-06 ENCOUNTER — VIRTUAL VISIT (OUTPATIENT)
Dept: PSYCHOLOGY | Facility: CLINIC | Age: 19
End: 2024-03-06
Payer: COMMERCIAL

## 2024-03-06 DIAGNOSIS — F43.23 ADJUSTMENT DISORDER WITH MIXED ANXIETY AND DEPRESSED MOOD: Primary | ICD-10-CM

## 2024-03-06 PROCEDURE — 90837 PSYTX W PT 60 MINUTES: CPT | Mod: 93 | Performed by: SOCIAL WORKER
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
## 2024-03-09 ENCOUNTER — HEALTH MAINTENANCE LETTER (OUTPATIENT)
Age: 19
End: 2024-03-09

## 2024-03-13 ENCOUNTER — VIRTUAL VISIT (OUTPATIENT)
Dept: PSYCHOLOGY | Facility: CLINIC | Age: 19
End: 2024-03-13
Payer: COMMERCIAL

## 2024-03-13 DIAGNOSIS — F43.23 ADJUSTMENT DISORDER WITH MIXED ANXIETY AND DEPRESSED MOOD: Primary | ICD-10-CM

## 2024-03-13 PROCEDURE — 90834 PSYTX W PT 45 MINUTES: CPT | Mod: 93 | Performed by: SOCIAL WORKER

## 2024-03-13 NOTE — PROGRESS NOTES
"  [facility]                                   Progress Note    Patient Name: Berta Oseguera  Date: 3/13/2024               Service Type: Individual  Attendee(s): Patient attended alone      Session Start Time: 1417  Session End Time: 1459     Session Length: 38 - 52 min  Session #: 17    Service Modality: Phone Visit:      Provider verified identity through the following two step process.  Patient provided:  Patient is known previously to provider and Patient was verified at admission/transfer  Telephone Visit: The patient's condition can be safely assessed and treated via synchronous audio telemedicine encounter.    Reason for Audio Telemedicine Visit: Patient has requested telehealth visit and Patient convenience (e.g. access to timely appointments / distance to available provider)  Originating Site (Patient Location): Patient's home  Distant Site (Provider Location): [facility]  Consent:  The patient/guardian has verbally consented to:   1. The potential risks and benefits of telemedicine (telephone visit) versus in person care;  The patient has been notified of the following:    \"We have found that certain health care needs can be provided without the need for a face to face visit.  This service lets us provide the care you need with a phone conversation.    I will have full access to your [facility] medical record during this entire phone call.   I will be taking notes for your medical record.   Since this is like an office visit, we will bill your insurance company for this service.    There are potential benefits and risks of telephone visits (e.g. limits to patient confidentiality) that differ from in-person visits.? Confidentiality still applies for telephone services, and nobody will record the visit.  It is important to be in a quiet, private space that is free of distractions (including cell phone or other devices) during the visit.??    If during " "the course of the call I believe a telephone visit is not appropriate, you will not be charged for this service\"  Consent has been obtained for this service by care team member: Yes     DATA  Extended Session (53+ minutes): No  Interactive Complexity: No  Crisis: No      Progress Since Last Session (related to symptoms/goals/homework):   Symptoms: Worsening - increased depressive and anxiety sx    Homework: Did not complete     Episode of Care Goals: Satisfactory progress - PREPARATION (Decided to change - considering how); Intervened by negotiating a change plan and determining options / strategies for behavior change, identifying triggers, exploring social supports, and working towards setting a date to begin behavior change    Current/Ongoing Stressors and Concerns: independent, social, strong family support, financial stress, trauma hx, recently quit marijuana (9/2023)     Treatment Objective(s) Addressed in This Session:   Verbalize an accurate understanding of depression.  Verbalize an understanding of the rationale for treatment of depression.  Identify and replace thoughts and beliefs that support depression.  Increasingly verbalize hopeful and positive statements regarding self, others, and the future.  Verbalize an understanding of healthy and unhealthy emotions with the intent of increasing the use of healthy emotions to guide actions.  Verbalize an understanding of the cognitive, physiological, and behavioral components of anxiety and its treatment.  Identify, challenge, and replace biased, fearful self-talk with positive, realistic, and empowering self-talk.  Increase insight into the historical and current sources of low self-esteem.  Decrease the verbalized fear of rejection while increasing statements of self-acceptance.  Identify positive traits and talents about self.  Identify and replace negative self-talk messages used to reinforce low self-confidence.  Identify and engage in activities that " would improve self-image by being consistent with one s values.  Decrease the frequency of negative self-descriptive statements and increase frequency of positive self-descriptive statements.  Demonstrate an increased ability to identify and express personal feelings.  Articulate a plan to be proactive in trying to get identified needs met.  Positively acknowledge verbal compliments from others.  Take verbal responsibility for accomplishments without discounting.  Learn and implement:  behavioral strategies to overcome depression  problem-solving and decision-making skills  calming skills to reduce overall anxiety and manage anxiety  problem-solving strategies for realistically addressing worries    Intervention: Scheduled from waitlist. Presents as sad, anxious, tearful. Endorses worsening symptoms. Shares that she was asked to meet with her boss tomorrow and fears she may be terminated. Examined anxious thoughts, noting how unrealistic worry tends to overestimate the probability of negative outcomes. Discussed how anxious fears are maintained by a cycle of unwarranted fear and avoidance that precludes positive, corrective experiences. Taught the role of distorted thinking in precipitating emotional responses. Assisted in replacing distorted messages with positive, realistic cognitions. Identified and challenged catastrophizing beliefs. Replaced irrational beliefs with more balanced ones. Provided empathy, validation, and unconditional positive regard. Patient was active and engaged in all aspects of the session. She responded well to the interventions and was able to use the session to make sense of her feelings and experiences.     Assessments completed prior to visit:  PHQ9:       6/21/2022    10:05 PM 9/13/2023    10:54 AM 9/27/2023     1:03 PM 10/18/2023     5:54 PM 11/15/2023    11:03 AM 12/20/2023     3:14 PM 1/24/2024    12:03 PM   PHQ-9 SCORE   PHQ-9 Total Score MyChart 19 (Moderately severe depression) 12  (Moderate depression) 13 (Moderate depression) 9 (Mild depression) 7 (Mild depression) 3 (Minimal depression) 21 (Severe depression)   PHQ-9 Total Score 19 12 13 9 7    7 3 21     GAD7:       9/13/2023    10:56 AM 9/27/2023     1:03 PM 10/18/2023     5:55 PM 11/15/2023    11:02 AM 12/20/2023     3:16 PM 1/24/2024    12:02 PM   YG-7 SCORE   Total Score 16 (severe anxiety) 20 (severe anxiety) 6 (mild anxiety) 7 (mild anxiety) 7 (mild anxiety) 15 (severe anxiety)   Total Score 16 20 6 7    7 7 15       ASSESSMENT: Current Emotional/Mental Status (status of significant symptoms):   Risk status (Self/Other harm or suicidal ideation)   Patient denies current fears or concerns for personal safety.   Patient denies current or recent suicidal ideation or behaviors.   Patient denies current or recent homicidal ideation or behaviors.   Patient denies current or recent self injurious behavior or ideation.   Patient denies other safety concerns.   Patient reports there has been no change in risk factors since their last session.     Patient reports there has been no change in protective factors since their last session.    Recommended that patient call 911 or go to the local ED should there be a change in any of these risk factors.     Appearance:   Unable to assess    Eye Contact:   Unable to assess    Psychomotor Behavior: Unable to assess    Attitude:   Cooperative  Pleasant   Orientation:   All   Speech    Rate/Production: Normal/ Responsive    Volume:  Normal    Mood:    Anxious  Sad    Affect:    Appropriate  Tearful   Thought Content:  Clear    Thought Form:  Coherent  Logical    Insight:    Fair      Medication Review: No current psychiatric medications prescribed     Medication Compliance: NA     Changes in Health Issues: None reported     Chemical Use Review:  Substance Use: Problem use continues with no change since last session, Reviewed concerns related to health related substance abuse risk  Patient declined  "discussion at this time      Quit marijuana September 2023; hx daily use.  Restarted marijuana late September 2023, using socially/recreationally.    Tobacco Use: Yes, decrease.  Patient reports frequency of use abstaining. Provided support and affirmation for steps taken towards quiting     Diagnosis:  1. Adjustment disorder with mixed anxiety and depressed mood      Collateral Reports Completed: Not Applicable    PLAN: Don't underestimate yourself. Schedule follow-up online and complete questionnaires.        Next appt(s): self-schedule; waitlist in the interim (review/update tx plan next session)   Prefers Mondays/Wednesdays ([facility]).    April ALMAS Abdirashid [facility]  3/13/2024                                            ______________________________________________________________________    Individual Treatment Plan    Patient's Name: Berta Oseguera  YOB: 2005    Date of Creation: 9/19/2023   Date Treatment Plan Last Reviewed/Revised: 12/20/2023; will next review 3/19/24      DSM5 Diagnoses: Adjustment Disorders  309.28 (F43.23) With mixed anxiety and depressed mood  Psychosocial/Contextual Factors: independent, social, strong family support, financial stress, trauma hx, recently quit marijuana (9/2023)  PROMIS (reviewed every 90 days): PROMIS-10 Scores      9/13/2023    11:11 AM 12/20/2023     3:19 PM   PROMIS-10 Total Score w/o Sub Scores   PROMIS TOTAL - SUBSCORES 20 25      Referral/Collaboration: Referral to another professional/service is not indicated at this time.    Anticipated number of session for this episode of care: 9-12 sessions  Anticipation frequency of session: Weekly  Anticipated Duration of each session: 38-52 minutes  Treatment plan will be reviewed in 90 days or when goals have been changed.     Measurable Treatment Goal(s) related to diagnosis/functional impairment(s)    \"To gain self-love and self-confidence again, talk about a lot of stuff that's been bothering me, " "to build a bonding relationship with you [therapist].\"     Goal 1: Patient will experience decrease in depressive symptoms as evidenced by PHQ-9 scores.    I will know I've met my goal when I have better conversations about my life and what I'm doing, and am less sad and irritable.   12/20/23: \"Marci the same. I'm less irritable, but still .... I\"m just always worried.\"     Objective A    Patient will verbalize an accurate understanding of depression.  Status:  Started 9/19/23, continued 12/20/23    Intervention(s): Therapist will, consistent with the treatment model, discuss how cognitive, behavioral, interpersonal, and/or other factors (e.g. family history) contribute to depression.     Objective B  Patient will verbalize an understanding of the rationale for treatment of depression.  Status:  Started 9/19/23, continued 12/20/23    Intervention(s): Therapist will, consistent with the treatment model, discuss how change in cognitive, behavioral, interpersonal, and/or other factors can help alleviate depression and return to previous level of effective functioning.     Objective C    Patient will identify and replace thoughts and beliefs that support depression.  Status:  Started 9/19/23, continued 12/20/23    Intervention(s): Therapist will conduct cognitive-behavioral therapy, first conveying the connection among cognition, depressive feelings, and actions. Assign the patient to self-monitor thoughts, feelings, and actions in a journal; process the journal material to identify, challenge, and change depressive thinking patterns and replace them with reality-based thoughts. Identify, challenge, and change depressive thinking patterns and replace them with reality-based thoughts. Assign  behavioral experiments  in and outside of sessions that test depressive automatic thoughts and beliefs against more reality-based ones toward a sustained shift reflecting hopefulness, motivation, confidence, and an improved " self-concept. Facilitate and reinforce the patient's shift from biased depressive self-talk and beliefs to reality-based alternatives that enhance emotion regulation and increase adaptive functioning. Explore and restructure underlying assumptions and schema reflected in biased self-talk that may place the patient at risk for relapse or recurrence; help build the patient's self-concept from unlovable, worthless, helpless, or incompetent to empowering alternatives.    Objective D  Patient will learn and implement behavioral strategies to overcome depression.  Status:  Started 9/19/23, continued 12/20/23     Intervention(s): Therapist will engage the patient in  behavioral activation,  increasing his/her/their activity level and contact with sources of reward, while identifying processes that inhibit or obstruct activation; use instruction, rehearsal, role-playing, role reversal, as needed, to facilitate activity in the patient's daily life; reinforce success, problem-solve obstacles. Assist the patient in developing skills that increase the likelihood of deriving pleasure and meaning from behavioral activation (e.g., assertiveness skills, developing an exercise plan, less internal/more external focus, increased social involvement); reinforce success; problem-solve obstacles toward sustained, rewarding activation.    Objective E  Patient will increasingly verbalize hopeful and positive statements regarding self, others, and the future.  Status:  Started 9/19/23, continued 12/20/23    Intervention(s): Therapist will teach more about depression and how to recognize and accept some sadness as a normal variation in feeling. Assign the patient to write positive affirmation statements regarding themselves and the future.      Objective F  Patient will learn and implement problem-solving and decision-making skills.                     Status:  Started 9/19/23, continued 12/20/23     Intervention(s): Therapist will conduct  "problem-solving therapy using techniques such as psychoeducation, modeling, and role-playing to teach patient problem-solving skills (i.e., defining a problem specifically, generating possible solutions, evaluating the pros and cons of each solution, selecting and implementing a plan of action, evaluating the efficacy of the plan, accepting or revising the plan); accepting or revising the plan); role-play application of the problem-solving skill to a real life issue. Encourage the development of a positive problem orientation in which problems and solving them are viewed as a natural part of life and not something to be feared, despaired, or avoided; reinforce successes toward sustained, effective use.    Objective G    Patient will verbalize an understanding of healthy and unhealthy emotions with the intent of increasing the use of healthy emotions to guide actions.  Status:  Started 9/19/23, continued 12/20/23    Intervention(s): Therapist will use a process-experiential approach consistent with emotion-focused therapy to create a safe, nurturing environment in which the patient can process emotions, learning to identify and regulate unhealthy feelings and to generate more adaptive ones that then guide actions.     Goal 2: Patient will experience decrease in anxiety symptoms as evidenced by YG-7 scores.   I will know I've met my goal when I am spending more time doing things just for myself, like being outside or going for walks, and doing things, not just sitting with friends looking at social media.    12/20/23: \"This has been better. I hang out with my siblings and family a lot more.\" Worrying more, always worried about the next bill, how I'm gonna pay it, how I'm gonna pay for food and gas.\"     Objective A    Patient will verbalize an understanding of the cognitive, physiological, and behavioral components of anxiety and its treatment.  Status:  Started 9/19/23, continued 12/20/23    Intervention(s): " Therapist will discuss how anxiety typically involves excessive worry about unrealistically appraised threats, various bodily expressions of overarousal, hypervigilance, and avoidance of what is threatening that interact to maintain the problem. Discuss how treatment targets worry, anxiety symptoms, and avoidance to help the patient manage worry effectively, reduce overarousal, eliminate unnecessary avoidance, and reengage in rewarding activities.    Objective B  Patient will verbalize an understanding of the role that thinking plays in worry, anxiety, and avoidance.  Status:  Started 9/19/23, continued 12/20/23    Intervention(s): Therapist will discuss examples demonstrating how unproductive worry typically overestimates the probability of threats and underestimates or overlooks the patient's ability to manage realistic demands. Assist the patient in analyzing worries by examining potential biases such as the probability of the negative expectation occurring, the real consequences of it occurring, ability to control the outcome, the worst possible outcome, and ability to accept it. Help the patient gain insight into the notion that worry creates acute and/or chronic anxious apprehension, leading to avoidance that precludes finding solutions to problems.    Objective C  Patient will identify, challenge, and replace biased, fearful self-talk with positive, realistic, and empowering self-talk.  Status:  Started 9/19/23, continued 12/20/23    Intervention(s): Therapist will utilize techniques from cognitive behavioral therapies including intolerance of uncertainty and metacognitive therapies explore the patient's self-talk, underlying assumptions, schema, or metacognition that mediate anxiety; assist the patient in challenging and changing biases; conduct behavioral experiments to test biased versus unbiased predictions toward dispelling unproductive worry and increasing self-confidence in addressing the subject of  worry. Assign homework exercises to identify fearful self-talk, identify biases in the self-talk, generate alternatives, and test them through behavioral experiments; review and reinforce success, providing corrective feedback toward improvement.    Objective D  Patient will learn and implement calming skills to reduce overall anxiety and manage anxiety.  Status:  Started 9/19/23, continued 12/20/23    Intervention(s): Therapist will teach calming/relaxation/mindfulness skills (e.g., applied relaxation, progressive muscle relaxation, cue controlled relaxation; mindful breathing; biofeedback) and how to discriminate better between relaxation and tension; teach the patient how to apply these skills to daily life. Assign homework in which he/she/they practice calming/relaxation/mindfulness skills, gradually applying them progressively from non-anxiety-provoking to anxiety-provoking situations; review and reinforce success; resolve obstacles toward sustained implementation.    Objective E  Patient will learn and implement problem-solving strategies for realistically addressing worries.  Status:  Started 9/19/23, continued 12/20/23    Intervention(s): Therapist will teach problem-solving/solution-finding strategies to replace unproductive worry involving specifically defining a problem, generating options for addressing it, evaluating the pros and cons of each option, selecting and implementing an action plan, and reevaluating and refining the action.    Goal 3: Patient will establish an inward sense of self-worth, confidence, and competence.    I will know I've met my goal when I am wearing something other than sweats outside of work.    12/20/23: No change. Wears sweats a lot in winter because of cold. Will take more pride in appearance. Would like to pick at pimples less.     Objective A  Patient will increase insight into the historical and current sources of low self-esteem.  Status:  Started 9/19/23, continued  12/20/23    Intervention(s): Therapist will help patient become aware of fear of rejection and its connection with past rejection or abandonment experiences; begin to contrast past experiences of pain with present experiences of acceptance and competence. Discuss, emphasize, and interpret the patient's incidents of abuse and how they have affected feelings about self.    Objective B  Patient will decrease the verbalized fear of rejection while increasing statements of self-acceptance.  Status:  Started 9/19/23, continued 12/20/23    Intervention(s): Therapist will assign the patient to write positive affirmation statements regarding themselves and the future. Verbally reinforce the patient s use of positive statements of confidence and accomplishments.    Objective C  Patient will identify positive traits and talents about self.  Status:  Started 9/19/23, continued 12/20/23    Intervention(s): Therapist will assign patient the exercise of identifying his/her/their positive physical characteristics in a mirror to help him/her/them become more comfortable with himself/herself/themselves. Ask the patient to keep building a list of positive traits and have him/her/them read the list at the beginning and end of each session     Objective D  Patient will identify and replace negative self-talk messages used to reinforce low self-confidence.  Status:  Started 9/19/23, continued 12/20/23     Intervention(s): Therapist will help the patient identify distorted, negative beliefs about self and the world and replace these messages with more realistic, affirmative messages. Ask the patient to complete and process self-esteem-building exercises from recommended self-help books (e.g., Ten Days to Self Esteem by Mcfarlane; The Self-Esteem  by Haroon Haines [place], and Ayad; 10 Simple Solutions for Building Self-Esteem by Mary Jane).     Objective E  Patient will identify and engage in activities that would improve  self-image by being consistent with one s values.  Status:  Started 9/19/23, continued 12/20/23    Intervention(s): Therapist will help patient analyze his/her/their values and the congruence or incongruence between them and the patient s daily activities. Identify and assign activities congruent with the patient s values; process them toward improving self-concept and self-esteem.    Objective F  Patient will decrease the frequency of negative self-descriptive statements and increase frequency of positive self-descriptive statements.  Status:  Started 9/19/23, continued 12/20/23    Intervention(s): Therapist will assist the patient in becoming aware of how he/she/they express or act out negative self-feelings. Help patient reframe his/her/their negative self-assessment. Assist in developing positive self-talk as a way of boosting confidence and self-image.    Objective G    Patient will demonstrate an increased ability to identify and express personal feelings.  Status:  Started 9/19/23, continued 12/20/23    Intervention(s): Therapist will assist patient in identifying and labeling emotions.    Objective H  Patient will articulate a plan to be proactive in trying to get identified needs met.  Status:  Started 9/19/23, continued 12/20/23    Intervention(s): Therapist will assist the patient in identifying and verbalizing needs, met and unmet. Assist the patient in developing a specific action plan to get each need met.    Objective I    Patient will positively acknowledge verbal compliments from others.  Status:  Started 9/19/23, continued 12/20/23    Intervention(s): Therapist will assign patient to be aware of and acknowledge graciously (without discounting) praise and compliments from others.    Objective J  Patient will take verbal responsibility for accomplishments without discounting.  Status:  Started 9/19/23, continued 12/20/23    Intervention(s): Therapist will ask patient list accomplishments; process the  integration of these into his/her/their self-image.    Patient has reviewed and agreed to the above plan.    Loree Mendenhall, [facility]  December 20, 2023

## 2024-03-18 ENCOUNTER — VIRTUAL VISIT (OUTPATIENT)
Dept: PSYCHOLOGY | Facility: CLINIC | Age: 19
End: 2024-03-18
Payer: COMMERCIAL

## 2024-03-18 DIAGNOSIS — F43.23 ADJUSTMENT DISORDER WITH MIXED ANXIETY AND DEPRESSED MOOD: Primary | ICD-10-CM

## 2024-03-18 PROCEDURE — 90834 PSYTX W PT 45 MINUTES: CPT | Mod: 93 | Performed by: SOCIAL WORKER

## 2024-03-18 ASSESSMENT — ANXIETY QUESTIONNAIRES
IF YOU CHECKED OFF ANY PROBLEMS ON THIS QUESTIONNAIRE, HOW DIFFICULT HAVE THESE PROBLEMS MADE IT FOR YOU TO DO YOUR WORK, TAKE CARE OF THINGS AT HOME, OR GET ALONG WITH OTHER PEOPLE: EXTREMELY DIFFICULT
1. FEELING NERVOUS, ANXIOUS, OR ON EDGE: NEARLY EVERY DAY
4. TROUBLE RELAXING: MORE THAN HALF THE DAYS
GAD7 TOTAL SCORE: 16
2. NOT BEING ABLE TO STOP OR CONTROL WORRYING: MORE THAN HALF THE DAYS
7. FEELING AFRAID AS IF SOMETHING AWFUL MIGHT HAPPEN: NEARLY EVERY DAY
6. BECOMING EASILY ANNOYED OR IRRITABLE: SEVERAL DAYS
GAD7 TOTAL SCORE: 16
5. BEING SO RESTLESS THAT IT IS HARD TO SIT STILL: NEARLY EVERY DAY
GAD7 TOTAL SCORE: 16
8. IF YOU CHECKED OFF ANY PROBLEMS, HOW DIFFICULT HAVE THESE MADE IT FOR YOU TO DO YOUR WORK, TAKE CARE OF THINGS AT HOME, OR GET ALONG WITH OTHER PEOPLE?: EXTREMELY DIFFICULT
7. FEELING AFRAID AS IF SOMETHING AWFUL MIGHT HAPPEN: NEARLY EVERY DAY
3. WORRYING TOO MUCH ABOUT DIFFERENT THINGS: MORE THAN HALF THE DAYS

## 2024-03-18 ASSESSMENT — PATIENT HEALTH QUESTIONNAIRE - PHQ9
SUM OF ALL RESPONSES TO PHQ QUESTIONS 1-9: 15
SUM OF ALL RESPONSES TO PHQ QUESTIONS 1-9: 15
10. IF YOU CHECKED OFF ANY PROBLEMS, HOW DIFFICULT HAVE THESE PROBLEMS MADE IT FOR YOU TO DO YOUR WORK, TAKE CARE OF THINGS AT HOME, OR GET ALONG WITH OTHER PEOPLE: EXTREMELY DIFFICULT
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
## 2024-04-02 ENCOUNTER — TELEPHONE (OUTPATIENT)
Dept: PSYCHOLOGY | Facility: CLINIC | Age: 19
End: 2024-04-02
Payer: COMMERCIAL

## 2024-04-02 NOTE — TELEPHONE ENCOUNTER
Contacted from waitlist to offer appointment. Unable to reach; left VM advising to callback if available.     ALANA Knight on 4/2/2024 at 3:04 PM

## 2024-04-15 ENCOUNTER — VIRTUAL VISIT (OUTPATIENT)
Dept: PSYCHOLOGY | Facility: CLINIC | Age: 19
End: 2024-04-15
Payer: COMMERCIAL

## 2024-04-15 DIAGNOSIS — F43.23 ADJUSTMENT DISORDER WITH MIXED ANXIETY AND DEPRESSED MOOD: Primary | ICD-10-CM

## 2024-04-15 PROCEDURE — 90837 PSYTX W PT 60 MINUTES: CPT | Mod: 93 | Performed by: SOCIAL WORKER

## 2024-04-15 ASSESSMENT — PATIENT HEALTH QUESTIONNAIRE - PHQ9
SUM OF ALL RESPONSES TO PHQ QUESTIONS 1-9: 9
SUM OF ALL RESPONSES TO PHQ QUESTIONS 1-9: 9
10. IF YOU CHECKED OFF ANY PROBLEMS, HOW DIFFICULT HAVE THESE PROBLEMS MADE IT FOR YOU TO DO YOUR WORK, TAKE CARE OF THINGS AT HOME, OR GET ALONG WITH OTHER PEOPLE: SOMEWHAT DIFFICULT

## 2024-04-15 ASSESSMENT — ANXIETY QUESTIONNAIRES
1. FEELING NERVOUS, ANXIOUS, OR ON EDGE: SEVERAL DAYS
GAD7 TOTAL SCORE: 9
7. FEELING AFRAID AS IF SOMETHING AWFUL MIGHT HAPPEN: SEVERAL DAYS
8. IF YOU CHECKED OFF ANY PROBLEMS, HOW DIFFICULT HAVE THESE MADE IT FOR YOU TO DO YOUR WORK, TAKE CARE OF THINGS AT HOME, OR GET ALONG WITH OTHER PEOPLE?: SOMEWHAT DIFFICULT
4. TROUBLE RELAXING: SEVERAL DAYS
GAD7 TOTAL SCORE: 9
5. BEING SO RESTLESS THAT IT IS HARD TO SIT STILL: SEVERAL DAYS
GAD7 TOTAL SCORE: 9
IF YOU CHECKED OFF ANY PROBLEMS ON THIS QUESTIONNAIRE, HOW DIFFICULT HAVE THESE PROBLEMS MADE IT FOR YOU TO DO YOUR WORK, TAKE CARE OF THINGS AT HOME, OR GET ALONG WITH OTHER PEOPLE: SOMEWHAT DIFFICULT
6. BECOMING EASILY ANNOYED OR IRRITABLE: MORE THAN HALF THE DAYS
2. NOT BEING ABLE TO STOP OR CONTROL WORRYING: SEVERAL DAYS
7. FEELING AFRAID AS IF SOMETHING AWFUL MIGHT HAPPEN: SEVERAL DAYS
3. WORRYING TOO MUCH ABOUT DIFFERENT THINGS: MORE THAN HALF THE DAYS

## 2024-04-15 NOTE — PROGRESS NOTES
"  [facility]                                   Progress Note    Patient Name: Berta Oseguera  Date: 4/15/2024                Service Type: Individual  Attendee(s): Patient attended alone      Session Start Time: 1531  Session End Time: 1641     Session Length: 53+ min       Session #: 19    Service Modality: Phone Visit:      Provider verified identity through the following two step process.  Patient provided:  Patient is known previously to provider and Patient was verified at admission/transfer  Telephone Visit: The patient's condition can be safely assessed and treated via synchronous audio telemedicine encounter.    Reason for Audio Telemedicine Visit: Patient has requested telehealth visit and Patient convenience (e.g. access to timely appointments / distance to available provider)  Originating Site (Patient Location): Patient's home  Distant Site (Provider Location): Provider Remote Setting- Home Office  Consent:  The patient/guardian has verbally consented to:   1. The potential risks and benefits of telemedicine (telephone visit) versus in person care;  The patient has been notified of the following:    \"We have found that certain health care needs can be provided without the need for a face to face visit.  This service lets us provide the care you need with a phone conversation.    I will have full access to your [facility] medical record during this entire phone call.   I will be taking notes for your medical record.   Since this is like an office visit, we will bill your insurance company for this service.    There are potential benefits and risks of telephone visits (e.g. limits to patient confidentiality) that differ from in-person visits.? Confidentiality still applies for telephone services, and nobody will record the visit.  It is important to be in a quiet, private space that is free of distractions (including cell phone or other devices) during the visit.??    If during " "the course of the call I believe a telephone visit is not appropriate, you will not be charged for this service\"  Consent has been obtained for this service by care team member: Yes     DATA  Extended Session (53+ minutes): PROLONGED SERVICE IN THE OUTPATIENT SETTING REQUIRING DIRECT (FACE-TO-FACE) PATIENT CONTACT BEYOND THE USUAL SERVICE:    - Longer session due to limited access to mental health appointments and necessity to address patient's distress / complexity  Interactive Complexity: No  Crisis: No      Progress Since Last Session (related to symptoms/goals/homework):   Symptoms: Improving - decreased depressive & anxiety sx    Homework: Partially completed     Episode of Care Goals: Satisfactory progress - PREPARATION (Decided to change - considering how); Intervened by negotiating a change plan and determining options / strategies for behavior change, identifying triggers, exploring social supports, and working towards setting a date to begin behavior change    Current/Ongoing Stressors and Concerns: independent, social, strong family support, financial stress, trauma hx, recently quit marijuana (9/2023)     Treatment Objective(s) Addressed in This Session:   Verbalize an accurate understanding of depression.  Verbalize an understanding of the rationale for treatment of depression.  Identify and replace thoughts and beliefs that support depression.  Increasingly verbalize hopeful and positive statements regarding self, others, and the future.  Verbalize an understanding of healthy and unhealthy emotions with the intent of increasing the use of healthy emotions to guide actions.  Verbalize an understanding of the cognitive, physiological, and behavioral components of anxiety and its treatment.  Identify, challenge, and replace biased, fearful self-talk with positive, realistic, and empowering self-talk.  Increase insight into the historical and current sources of low self-esteem.  Decrease the verbalized " "fear of rejection while increasing statements of self-acceptance.  Identify positive traits and talents about self.  Identify and replace negative self-talk messages used to reinforce low self-confidence.  Identify and engage in activities that would improve self-image by being consistent with one s values.  Decrease the frequency of negative self-descriptive statements and increase frequency of positive self-descriptive statements.  Demonstrate an increased ability to identify and express personal feelings.  Articulate a plan to be proactive in trying to get identified needs met.  Positively acknowledge verbal compliments from others.  Take verbal responsibility for accomplishments without discounting.  Learn and implement:  behavioral strategies to overcome depression  problem-solving and decision-making skills  calming skills to reduce overall anxiety and manage anxiety  problem-solving strategies for realistically addressing worries    Intervention: Scheduled from waitlist. Presents with euthymic mood, congruent affect. Endorses improving symptoms. Happily shares about new job that she will be starting tomorrow. Provided active listening as patient reviewed and processed events since last session, including birthday trip to [place] and interpersonal conflict. Assisted patient in resolving interpersonal conflicts (e.g., using problem-solving, assertiveness training, adaptive communication habits, etc.). Checked-in with patient with regards to therapeutic process, progress, and relationship. Reports overall symptom improvement. Continues to endorse positive therapeutic rapport, and denies concerns, aspects that aren't working, or need for change from writer with regards to approach or process. States, \"It's very helpful to talk to someone who gives reasonable feedback, and listens to what's going on. I enjoy it. I definitely feel like it's needed and very helpful.\" Expresses desire to continue working towards " previously identified treatment goals, and identifies additional goal related to budgeting, routine and physical health. Treatment plan updated to reflect these changes. Provided empathy, validation, and unconditional positive regard. Patient was active and engaged in all aspects of the session. She responded well to the interventions and was able to use the session to make sense of her feelings and experiences.     Assessments completed prior to visit:  PHQ9:       9/27/2023     1:03 PM 10/18/2023     5:54 PM 11/15/2023    11:03 AM 12/20/2023     3:14 PM 1/24/2024    12:03 PM 3/18/2024     1:11 PM 4/15/2024     4:17 PM   PHQ-9 SCORE   PHQ-9 Total Score MyChart 13 (Moderate depression) 9 (Mild depression) 7 (Mild depression) 3 (Minimal depression) 21 (Severe depression) 15 (Moderately severe depression) 9 (Mild depression)   PHQ-9 Total Score 13 9 7    7 3 21 15 9     GAD7:       9/27/2023     1:03 PM 10/18/2023     5:55 PM 11/15/2023    11:02 AM 12/20/2023     3:16 PM 1/24/2024    12:02 PM 3/18/2024     1:12 PM 4/15/2024     4:19 PM   YG-7 SCORE   Total Score 20 (severe anxiety) 6 (mild anxiety) 7 (mild anxiety) 7 (mild anxiety) 15 (severe anxiety) 16 (severe anxiety) 9 (mild anxiety)   Total Score 20 6 7    7 7 15 16 9       ASSESSMENT: Current Emotional/Mental Status (status of significant symptoms):   Risk status (Self/Other harm or suicidal ideation)   Patient denies current fears or concerns for personal safety.   Patient denies current or recent suicidal ideation or behaviors.   Patient denies current or recent homicidal ideation or behaviors.   Patient denies current or recent self injurious behavior or ideation.   Patient denies other safety concerns.   Patient reports there has been no change in risk factors since their last session.     Patient reports there has been no change in protective factors since their last session.    Recommended that patient call 911 or go to the local ED should there be a  change in any of these risk factors.     Appearance:   Unable to assess    Eye Contact:   Unable to assess    Psychomotor Behavior: Unable to assess    Attitude:   Cooperative  Pleasant   Orientation:   All   Speech    Rate/Production: Normal/ Responsive    Volume:  Normal    Mood:    Normal Euthymic   Affect:    Appropriate    Thought Content:  Clear    Thought Form:  Coherent  Logical    Insight:    Fair      Medication Review: No current psychiatric medications prescribed     Medication Compliance: NA     Changes in Health Issues: None reported     Chemical Use Review:  Substance Use: Problem use continues with no change since last session, Reviewed concerns related to health related substance abuse risk  Patient declined discussion at this time      Quit marijuana September 2023; hx daily use.  Restarted marijuana late September 2023, using socially/recreationally.  4/15/24: Estimates using marijuana every two days.    Tobacco Use: Yes, increase.  Patient reports frequency of use daily vaping. Provided encouragement to quit     Diagnosis:  1. Adjustment disorder with mixed anxiety and depressed mood      Collateral Reports Completed: Not Applicable    PLAN: self-schedule online, identify budgeting alexis          Next appt(s): self-schedule; waitlist in the interim     April JONATAN Bunn  4/15/2024                                           ______________________________________________________________________    Individual Treatment Plan    Patient's Name: Berta Oseguera  YOB: 2005    Date of Creation: 9/19/2023   Date Treatment Plan Last Reviewed/Revised: 4/15/2024; will next review 7/14/24       DSM5 Diagnoses: Adjustment Disorders  309.28 (F43.23) With mixed anxiety and depressed mood  Psychosocial/Contextual Factors: independent, social, strong family support, financial stress, trauma hx, recently quit marijuana (9/2023)  PROMIS (reviewed every 90 days): PROMIS-10 Scores       "9/13/2023    11:11 AM 12/20/2023     3:19 PM 4/15/2024     4:22 PM   PROMIS-10 Total Score w/o Sub Scores   PROMIS TOTAL - SUBSCORES 20 25 24      Referral/Collaboration: Referral to another professional/service is not indicated at this time.    Anticipated number of session for this episode of care: 9-12 sessions  Anticipation frequency of session: Biweekly  Anticipated Duration of each session: 38-52 minutes  Treatment plan will be reviewed in 90 days or when goals have been changed.     Measurable Treatment Goal(s) related to diagnosis/functional impairment(s)    \"To gain self-love and self-confidence again, talk about a lot of stuff that's been bothering me, to build a bonding relationship with you [therapist].\"     Goal 1: Patient will experience decrease in depressive symptoms as evidenced by PHQ-9 scores.    I will know I've met my goal when I have better conversations about my life and what I'm doing, and am less sad and irritable.   12/20/23: \"Marci the same. I'm less irritable, but still .... I\"m just always worried.\"   4/15/24: \"I feel like I came out of a really dark spot, but I still feel like that spot could be easily fallen back into. I want to work on not falling into it so easily or quickly.\"     Objective A    Patient will verbalize an accurate understanding of depression.  Status: New - date: 9/19/23. Continued - date(s): 12/20/23, 4/15/24.  Intervention(s): Therapist will, consistent with the treatment model, discuss how cognitive, behavioral, interpersonal, and/or other factors (e.g. family history) contribute to depression.     Objective B  Patient will verbalize an understanding of the rationale for treatment of depression.  Status: New - date: 9/19/23. Continued - date(s): 12/20/23, 4/15/24.  Intervention(s): Therapist will, consistent with the treatment model, discuss how change in cognitive, behavioral, interpersonal, and/or other factors can help alleviate depression and return to previous " level of effective functioning.     Objective C    Patient will identify and replace thoughts and beliefs that support depression.  Status: New - date: 9/19/23. Continued - date(s): 12/20/23, 4/15/24.  Intervention(s): Therapist will conduct cognitive-behavioral therapy, first conveying the connection among cognition, depressive feelings, and actions. Assign the patient to self-monitor thoughts, feelings, and actions in a journal; process the journal material to identify, challenge, and change depressive thinking patterns and replace them with reality-based thoughts. Identify, challenge, and change depressive thinking patterns and replace them with reality-based thoughts. Assign  behavioral experiments  in and outside of sessions that test depressive automatic thoughts and beliefs against more reality-based ones toward a sustained shift reflecting hopefulness, motivation, confidence, and an improved self-concept. Facilitate and reinforce the patient's shift from biased depressive self-talk and beliefs to reality-based alternatives that enhance emotion regulation and increase adaptive functioning. Explore and restructure underlying assumptions and schema reflected in biased self-talk that may place the patient at risk for relapse or recurrence; help build the patient's self-concept from unlovable, worthless, helpless, or incompetent to empowering alternatives.    Objective D  Patient will learn and implement behavioral strategies to overcome depression.  Status: New - date: 9/19/23. Continued - date(s): 12/20/23, 4/15/24.  Intervention(s): Therapist will engage the patient in  behavioral activation,  increasing his/her/their activity level and contact with sources of reward, while identifying processes that inhibit or obstruct activation; use instruction, rehearsal, role-playing, role reversal, as needed, to facilitate activity in the patient's daily life; reinforce success, problem-solve obstacles. Assist the  patient in developing skills that increase the likelihood of deriving pleasure and meaning from behavioral activation (e.g., assertiveness skills, developing an exercise plan, less internal/more external focus, increased social involvement); reinforce success; problem-solve obstacles toward sustained, rewarding activation.    Objective E  Patient will increasingly verbalize hopeful and positive statements regarding self, others, and the future.  Status: New - date: 9/19/23. Continued - date(s): 12/20/23, 4/15/24.  Intervention(s): Therapist will teach more about depression and how to recognize and accept some sadness as a normal variation in feeling. Assign the patient to write positive affirmation statements regarding themselves and the future.      Objective F  Patient will learn and implement problem-solving and decision-making skills.                     Status: New - date: 9/19/23. Continued - date(s): 12/20/23, 4/15/24.  Intervention(s): Therapist will conduct problem-solving therapy using techniques such as psychoeducation, modeling, and role-playing to teach patient problem-solving skills (i.e., defining a problem specifically, generating possible solutions, evaluating the pros and cons of each solution, selecting and implementing a plan of action, evaluating the efficacy of the plan, accepting or revising the plan); accepting or revising the plan); role-play application of the problem-solving skill to a real life issue. Encourage the development of a positive problem orientation in which problems and solving them are viewed as a natural part of life and not something to be feared, despaired, or avoided; reinforce successes toward sustained, effective use.    Objective G    Patient will verbalize an understanding of healthy and unhealthy emotions with the intent of increasing the use of healthy emotions to guide actions.  Status: New - date: 9/19/23. Continued - date(s): 12/20/23, 4/15/24.  Intervention(s):  "Therapist will use a process-experiential approach consistent with emotion-focused therapy to create a safe, nurturing environment in which the patient can process emotions, learning to identify and regulate unhealthy feelings and to generate more adaptive ones that then guide actions.     Goal 2: Patient will experience decrease in anxiety symptoms as evidenced by YG-7 scores.   I will know I've met my goal when I am spending more time doing things just for myself, like being outside or going for walks, and doing things, not just sitting with friends looking at social media.    12/20/23: \"This has been better. I hang out with my siblings and family a lot more.\" Worrying more, always worried about the next bill, how I'm gonna pay it, how I'm gonna pay for food and gas.\"   4/15/24: \"It's not totally under control, sometimes it gets uncomfortable.\"    Objective A    Patient will verbalize an understanding of the cognitive, physiological, and behavioral components of anxiety and its treatment.  Status: New - date: 9/19/23. Continued - date(s): 12/20/23, 4/15/24.  Intervention(s): Therapist will discuss how anxiety typically involves excessive worry about unrealistically appraised threats, various bodily expressions of overarousal, hypervigilance, and avoidance of what is threatening that interact to maintain the problem. Discuss how treatment targets worry, anxiety symptoms, and avoidance to help the patient manage worry effectively, reduce overarousal, eliminate unnecessary avoidance, and reengage in rewarding activities.    Objective B  Patient will verbalize an understanding of the role that thinking plays in worry, anxiety, and avoidance.  Status: New - date: 9/19/23. Continued - date(s): 12/20/23, 4/15/24.  Intervention(s): Therapist will discuss examples demonstrating how unproductive worry typically overestimates the probability of threats and underestimates or overlooks the patient's ability to manage " realistic demands. Assist the patient in analyzing worries by examining potential biases such as the probability of the negative expectation occurring, the real consequences of it occurring, ability to control the outcome, the worst possible outcome, and ability to accept it. Help the patient gain insight into the notion that worry creates acute and/or chronic anxious apprehension, leading to avoidance that precludes finding solutions to problems.    Objective C  Patient will identify, challenge, and replace biased, fearful self-talk with positive, realistic, and empowering self-talk.  Status: New - date: 9/19/23. Continued - date(s): 12/20/23, 4/15/24.  Intervention(s): Therapist will utilize techniques from cognitive behavioral therapies including intolerance of uncertainty and metacognitive therapies explore the patient's self-talk, underlying assumptions, schema, or metacognition that mediate anxiety; assist the patient in challenging and changing biases; conduct behavioral experiments to test biased versus unbiased predictions toward dispelling unproductive worry and increasing self-confidence in addressing the subject of worry. Assign homework exercises to identify fearful self-talk, identify biases in the self-talk, generate alternatives, and test them through behavioral experiments; review and reinforce success, providing corrective feedback toward improvement.    Objective D  Patient will learn and implement calming skills to reduce overall anxiety and manage anxiety.  Status: New - date: 9/19/23. Continued - date(s): 12/20/23, 4/15/24.  Intervention(s): Therapist will teach calming/relaxation/mindfulness skills (e.g., applied relaxation, progressive muscle relaxation, cue controlled relaxation; mindful breathing; biofeedback) and how to discriminate better between relaxation and tension; teach the patient how to apply these skills to daily life. Assign homework in which he/she/they practice  "calming/relaxation/mindfulness skills, gradually applying them progressively from non-anxiety-provoking to anxiety-provoking situations; review and reinforce success; resolve obstacles toward sustained implementation.    Objective E  Patient will learn and implement problem-solving strategies for realistically addressing worries.  Status: New - date: 9/19/23. Continued - date(s): 12/20/23, 4/15/24.  Intervention(s): Therapist will teach problem-solving/solution-finding strategies to replace unproductive worry involving specifically defining a problem, generating options for addressing it, evaluating the pros and cons of each option, selecting and implementing an action plan, and reevaluating and refining the action.    Goal 3: Patient will establish an inward sense of self-worth, confidence, and competence.    I will know I've met my goal when I am wearing something other than sweats outside of work.    12/20/23: No change. Wears sweats a lot in winter because of cold. Will take more pride in appearance. Would like to pick at pimples less.  4/15/24: \"I feel like my self-confidence is better, but still not to where I want it to be.\"      Objective A  Patient will increase insight into the historical and current sources of low self-esteem.  Status: New - date: 9/19/23. Continued - date(s): 12/20/23, 4/15/24.  Intervention(s): Therapist will help patient become aware of fear of rejection and its connection with past rejection or abandonment experiences; begin to contrast past experiences of pain with present experiences of acceptance and competence. Discuss, emphasize, and interpret the patient's incidents of abuse and how they have affected feelings about self.    Objective B  Patient will decrease the verbalized fear of rejection while increasing statements of self-acceptance.  Status: New - date: 9/19/23. Continued - date(s): 12/20/23, 4/15/24.  Intervention(s): Therapist will assign the patient to write positive " affirmation statements regarding themselves and the future. Verbally reinforce the patient s use of positive statements of confidence and accomplishments.    Objective C  Patient will identify positive traits and talents about self.  Status: New - date: 9/19/23. Continued - date(s): 12/20/23, 4/15/24.  Intervention(s): Therapist will assign patient the exercise of identifying his/her/their positive physical characteristics in a mirror to help him/her/them become more comfortable with himself/herself/themselves. Ask the patient to keep building a list of positive traits and have him/her/them read the list at the beginning and end of each session     Objective D  Patient will identify and replace negative self-talk messages used to reinforce low self-confidence.  Status: New - date: 9/19/23. Continued - date(s): 12/20/23, 4/15/24.  Intervention(s): Therapist will help the patient identify distorted, negative beliefs about self and the world and replace these messages with more realistic, affirmative messages. Ask the patient to complete and process self-esteem-building exercises from recommended self-help books (e.g., Ten Days to Self Esteem by Maeve; The Self-Esteem  by Haroon Haines [place], and Ayad; 10 Simple Solutions for Building Self-Esteem by Mary Jane).     Objective E  Patient will identify and engage in activities that would improve self-image by being consistent with one s values.  Status: New - date: 9/19/23. Continued - date(s): 12/20/23, 4/15/24.  Intervention(s): Therapist will help patient analyze his/her/their values and the congruence or incongruence between them and the patient s daily activities. Identify and assign activities congruent with the patient s values; process them toward improving self-concept and self-esteem.    Objective F  Patient will decrease the frequency of negative self-descriptive statements and increase frequency of positive self-descriptive  statements.  Status: New - date: 9/19/23. Continued - date(s): 12/20/23, 4/15/24.  Intervention(s): Therapist will assist the patient in becoming aware of how he/she/they express or act out negative self-feelings. Help patient reframe his/her/their negative self-assessment. Assist in developing positive self-talk as a way of boosting confidence and self-image.    Objective G    Patient will demonstrate an increased ability to identify and express personal feelings.  Status: New - date: 9/19/23. Continued - date(s): 12/20/23, 4/15/24.  Intervention(s): Therapist will assist patient in identifying and labeling emotions.    Objective H  Patient will articulate a plan to be proactive in trying to get identified needs met.  Status: New - date: 9/19/23. Continued - date(s): 12/20/23, 4/15/24.  Intervention(s): Therapist will assist the patient in identifying and verbalizing needs, met and unmet. Assist the patient in developing a specific action plan to get each need met.    Objective I    Patient will positively acknowledge verbal compliments from others.  Status: New - date: 9/19/23. Continued - date(s): 12/20/23, 4/15/24.  Intervention(s): Therapist will assign patient to be aware of and acknowledge graciously (without discounting) praise and compliments from others.    Objective J  Patient will take verbal responsibility for accomplishments without discounting.  Status: New - date: 9/19/23. Continued - date(s): 12/20/23, 4/15/24.  Intervention(s): Therapist will ask patient list accomplishments; process the integration of these into his/her/their self-image.    Goal 4: Patient will achieve a satisfactory level of balance, structure, and intimacy in personal life.    I will know I've met my goal when I am budgeting, going grocery shopping, have more of a routine, and focus more on my physical health (eat more regularly).       Objective A                  Patient will acknowledge procrastination and the need to reduce  it.  Status: New - date: 4/15/24.  Intervention(s): Therapist will assist in identifying positives and negatives of procrastinating toward the goal of engaging the patient in staying focused.      Objective B  Patient will learn and implement skills to reduce procrastination.  Status: New - date: 4/15/24.  Intervention(s): Therapist will teach to apply new problem-solving skills to planning as a first step in overcoming procrastination; for each plan, break it down into manageable time-limited steps to reduce the influence of distractibility. Assign homework asking the patient to accomplish identified tasks without procrastination using the techniques learned in therapy); and review and provide corrective feedback toward improving the skill and decreasing procrastination.      Objective C  Patient will learn and implement organization and planning skills.  Status: New - date: 4/15/24.  Intervention(s): Therapist will teach organization and planning skills including the routine use of a calendar and daily task list. Develop with the patient a procedure for classifying and managing mail and other papers. Teach problem-solving skills (i.e., identify problem, brainstorm all possible options, evaluate the pros and cons of each option, select best option, implement a course of action, and evaluate results) as an approach to planning; for each plan, break it down into manageable time-limited steps to reduce the influence of distractibility.    Patient has reviewed and agreed to the above plan.    Loree Mendenhall, [facility]  April 15, 2024

## 2024-05-01 ENCOUNTER — OFFICE VISIT (OUTPATIENT)
Dept: MIDWIFE SERVICES | Facility: CLINIC | Age: 19
End: 2024-05-01
Payer: COMMERCIAL

## 2024-05-01 VITALS
RESPIRATION RATE: 16 BRPM | BODY MASS INDEX: 27.62 KG/M2 | TEMPERATURE: 98.4 F | SYSTOLIC BLOOD PRESSURE: 105 MMHG | WEIGHT: 187 LBS | HEART RATE: 75 BPM | DIASTOLIC BLOOD PRESSURE: 63 MMHG

## 2024-05-01 DIAGNOSIS — B96.89 BACTERIAL VAGINOSIS: ICD-10-CM

## 2024-05-01 DIAGNOSIS — Z11.3 SCREEN FOR STD (SEXUALLY TRANSMITTED DISEASE): Primary | ICD-10-CM

## 2024-05-01 DIAGNOSIS — N76.0 BACTERIAL VAGINOSIS: ICD-10-CM

## 2024-05-01 DIAGNOSIS — N89.8 VAGINAL DISCHARGE: ICD-10-CM

## 2024-05-01 LAB
CLUE CELLS: PRESENT
TRICHOMONAS, WET PREP: ABNORMAL
WBC'S/HIGH POWER FIELD, WET PREP: ABNORMAL
YEAST, WET PREP: ABNORMAL

## 2024-05-01 PROCEDURE — 87491 CHLMYD TRACH DNA AMP PROBE: CPT | Performed by: ADVANCED PRACTICE MIDWIFE

## 2024-05-01 PROCEDURE — 87210 SMEAR WET MOUNT SALINE/INK: CPT | Performed by: ADVANCED PRACTICE MIDWIFE

## 2024-05-01 PROCEDURE — 87591 N.GONORRHOEAE DNA AMP PROB: CPT | Performed by: ADVANCED PRACTICE MIDWIFE

## 2024-05-01 PROCEDURE — 99213 OFFICE O/P EST LOW 20 MIN: CPT | Performed by: ADVANCED PRACTICE MIDWIFE

## 2024-05-01 RX ORDER — METRONIDAZOLE 500 MG/1
500 TABLET ORAL 2 TIMES DAILY
Qty: 14 TABLET | Refills: 0 | Status: SHIPPED | OUTPATIENT
Start: 2024-05-01 | End: 2024-05-08

## 2024-05-01 NOTE — PROGRESS NOTES
S:  Berta is here for STI testing and wet prep. She has noticed vaginal irritation/burning and malodor. She has the same sexual partner but previously had gonorrhea with this partner. She is due for first Pap smear. Discussed cervical cancer screening in detail today. She has received HPV vaccine. She will make future appointment for Pap as she needs to leave clinic quickly today.     O:  /63 (BP Location: Right arm, Patient Position: Sitting, Cuff Size: Adult Large)   Pulse 75   Temp 98.4  F (36.9  C)   Resp 16   Wt 84.8 kg (187 lb)   LMP 04/19/2024 (Approximate)   BMI 27.62 kg/m    Wet prep positive for clue cells indicating bacterial vaginosis    A/P:  (Z11.3) Screen for STD (sexually transmitted disease)  (primary encounter diagnosis)  Plan: Chlamydia trachomatis/Neisseria gonorrhoeae by         PCR, Wet preparation    (N89.8) Vaginal discharge  Plan: Wet preparation    (N76.0,  B96.89) Bacterial vaginosis  Plan: metroNIDAZOLE (FLAGYL) 500 MG tablet      Evonne Fox, CAROL[facility] SCHILLING

## 2024-05-02 LAB
C TRACH DNA SPEC QL PROBE+SIG AMP: NEGATIVE
N GONORRHOEA DNA SPEC QL NAA+PROBE: NEGATIVE

## 2024-06-17 ENCOUNTER — VIRTUAL VISIT (OUTPATIENT)
Dept: PSYCHOLOGY | Facility: CLINIC | Age: 19
End: 2024-06-17
Payer: COMMERCIAL

## 2024-06-17 DIAGNOSIS — F43.23 ADJUSTMENT DISORDER WITH MIXED ANXIETY AND DEPRESSED MOOD: Primary | ICD-10-CM

## 2024-06-17 PROCEDURE — 90832 PSYTX W PT 30 MINUTES: CPT | Mod: 95 | Performed by: SOCIAL WORKER

## 2024-06-17 NOTE — PROGRESS NOTES
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 regularly).       Objective A                  Patient will acknowledge procrastination and the need to reduce it.  Status: New - date: 4/15/24.  Intervention(s): Therapist will assist in identifying positives and negatives of procrastinating toward the goal of engaging the patient in staying focused.      Objective B  Patient will learn and implement skills to reduce procrastination.  Status: New - date: 4/15/24.  Intervention(s): Therapist will teach to apply new problem-solving skills to planning as a first step in overcoming procrastination; for each plan, break it down into manageable time-limited steps to reduce the influence of distractibility. Assign homework asking the patient to accomplish identified tasks without procrastination using the techniques learned in therapy); and review and provide corrective feedback toward improving the skill and decreasing procrastination.      Objective C  Patient will learn and implement organization and planning skills.  Status: New - date: 4/15/24.  Intervention(s): Therapist will teach organization and planning skills including the routine use of a calendar and daily task list. Develop with the patient a procedure for classifying and managing mail and other papers. Teach problem-solving skills (i.e., identify problem, brainstorm all possible  options, evaluate the pros and cons of each option, select best option, implement a course of action, and evaluate results) as an approach to planning; for each plan, break it down into manageable time-limited steps to reduce the influence of distractibility.    Patient has reviewed and agreed to the above plan.    Loree Mendenhall, ALANASW  April 15, 2024

## 2024-06-28 ENCOUNTER — OFFICE VISIT (OUTPATIENT)
Dept: MIDWIFE SERVICES | Facility: CLINIC | Age: 19
End: 2024-06-28
Payer: COMMERCIAL

## 2024-06-28 VITALS
DIASTOLIC BLOOD PRESSURE: 88 MMHG | WEIGHT: 184.4 LBS | OXYGEN SATURATION: 99 % | HEART RATE: 75 BPM | SYSTOLIC BLOOD PRESSURE: 132 MMHG | BODY MASS INDEX: 27.23 KG/M2

## 2024-06-28 DIAGNOSIS — Z11.3 ROUTINE SCREENING FOR STI (SEXUALLY TRANSMITTED INFECTION): Primary | ICD-10-CM

## 2024-06-28 PROCEDURE — 87210 SMEAR WET MOUNT SALINE/INK: CPT | Performed by: ADVANCED PRACTICE MIDWIFE

## 2024-06-28 PROCEDURE — 87591 N.GONORRHOEAE DNA AMP PROB: CPT | Performed by: ADVANCED PRACTICE MIDWIFE

## 2024-06-28 PROCEDURE — 87491 CHLMYD TRACH DNA AMP PROBE: CPT | Performed by: ADVANCED PRACTICE MIDWIFE

## 2024-06-28 PROCEDURE — 99213 OFFICE O/P EST LOW 20 MIN: CPT | Performed by: ADVANCED PRACTICE MIDWIFE

## 2024-06-28 NOTE — PROGRESS NOTES
S:  Berta is here for routine STI screening. She denies any pelvic pain, changes in vaginal odor or discharge.     O:  /88   Pulse 75   Wt 83.6 kg (184 lb 6.4 oz)   LMP 06/11/2024 (Within Days)   SpO2 99%   BMI 27.23 kg/m    Wet prep negative for infection  GC/CT pending    A/P:  (Z11.3) Routine screening for STI (sexually transmitted infection)  (primary encounter diagnosis)  Plan: Chlamydia trachomatis/Neisseria gonorrhoeae by         PCR, Wet preparation    CAROLA PatriciaM

## 2024-06-29 LAB
C TRACH DNA SPEC QL PROBE+SIG AMP: NEGATIVE
N GONORRHOEA DNA SPEC QL NAA+PROBE: NEGATIVE

## 2024-07-23 ENCOUNTER — VIRTUAL VISIT (OUTPATIENT)
Dept: PSYCHOLOGY | Facility: CLINIC | Age: 19
End: 2024-07-23
Payer: COMMERCIAL

## 2024-07-23 DIAGNOSIS — F43.23 ADJUSTMENT DISORDER WITH MIXED ANXIETY AND DEPRESSED MOOD: Primary | ICD-10-CM

## 2024-07-23 PROCEDURE — 90837 PSYTX W PT 60 MINUTES: CPT | Mod: 93 | Performed by: SOCIAL WORKER

## 2024-07-23 ASSESSMENT — PATIENT HEALTH QUESTIONNAIRE - PHQ9
SUM OF ALL RESPONSES TO PHQ QUESTIONS 1-9: 7
SUM OF ALL RESPONSES TO PHQ QUESTIONS 1-9: 7
10. IF YOU CHECKED OFF ANY PROBLEMS, HOW DIFFICULT HAVE THESE PROBLEMS MADE IT FOR YOU TO DO YOUR WORK, TAKE CARE OF THINGS AT HOME, OR GET ALONG WITH OTHER PEOPLE: SOMEWHAT DIFFICULT

## 2024-07-23 ASSESSMENT — ANXIETY QUESTIONNAIRES
7. FEELING AFRAID AS IF SOMETHING AWFUL MIGHT HAPPEN: MORE THAN HALF THE DAYS
7. FEELING AFRAID AS IF SOMETHING AWFUL MIGHT HAPPEN: MORE THAN HALF THE DAYS
4. TROUBLE RELAXING: SEVERAL DAYS
2. NOT BEING ABLE TO STOP OR CONTROL WORRYING: MORE THAN HALF THE DAYS
8. IF YOU CHECKED OFF ANY PROBLEMS, HOW DIFFICULT HAVE THESE MADE IT FOR YOU TO DO YOUR WORK, TAKE CARE OF THINGS AT HOME, OR GET ALONG WITH OTHER PEOPLE?: SOMEWHAT DIFFICULT
1. FEELING NERVOUS, ANXIOUS, OR ON EDGE: MORE THAN HALF THE DAYS
3. WORRYING TOO MUCH ABOUT DIFFERENT THINGS: MORE THAN HALF THE DAYS
GAD7 TOTAL SCORE: 11
6. BECOMING EASILY ANNOYED OR IRRITABLE: SEVERAL DAYS
IF YOU CHECKED OFF ANY PROBLEMS ON THIS QUESTIONNAIRE, HOW DIFFICULT HAVE THESE PROBLEMS MADE IT FOR YOU TO DO YOUR WORK, TAKE CARE OF THINGS AT HOME, OR GET ALONG WITH OTHER PEOPLE: SOMEWHAT DIFFICULT
GAD7 TOTAL SCORE: 11
5. BEING SO RESTLESS THAT IT IS HARD TO SIT STILL: SEVERAL DAYS
GAD7 TOTAL SCORE: 11

## 2024-07-23 NOTE — PROGRESS NOTES
"  [facility]                                   Progress Note    Patient Name: Berta Oseguera  Date: 7/23/2024                  Service Type: Individual  Attendee(s): Patient attended alone      Session Start Time: 0830  Session End Time: 0947     Session Length: 53+ min       Session #: 21    Service Modality: Phone Visit:      Provider verified identity through the following two step process.  Patient provided:  Patient is known previously to provider and Patient was verified at admission/transfer  Telephone Visit: The patient's condition can be safely assessed and treated via synchronous audio telemedicine encounter.    Reason for Audio Telemedicine Visit: Patient has requested telehealth visit and Patient convenience (e.g. access to timely appointments / distance to available provider)  Originating Site (Patient Location): Patient's home  Distant Site (Provider Location): Provider Remote Setting- Home Office  Consent:  The patient/guardian has verbally consented to:   1. The potential risks and benefits of telemedicine (telephone visit) versus in person care;  The patient has been notified of the following:    \"We have found that certain health care needs can be provided without the need for a face to face visit.  This service lets us provide the care you need with a phone conversation.    I will have full access to your [facility] medical record during this entire phone call.   I will be taking notes for your medical record.   Since this is like an office visit, we will bill your insurance company for this service.    There are potential benefits and risks of telephone visits (e.g. limits to patient confidentiality) that differ from in-person visits.? Confidentiality still applies for telephone services, and nobody will record the visit.  It is important to be in a quiet, private space that is free of distractions (including cell phone or other devices) during the visit.??    If during " "the course of the call I believe a telephone visit is not appropriate, you will not be charged for this service\"  Consent has been obtained for this service by care team member: Yes     DATA  Extended Session (53+ minutes): PROLONGED SERVICE IN THE OUTPATIENT SETTING REQUIRING DIRECT (FACE-TO-FACE) PATIENT CONTACT BEYOND THE USUAL SERVICE:    - Patient's presenting concerns require more intensive intervention than could be completed within the usual service  Interactive Complexity: No  Crisis: No      Progress Since Last Session (related to symptoms/goals/homework):   Symptoms: No change - stable    Homework: Partially completed     Episode of Care Goals: Satisfactory progress - PREPARATION (Decided to change - considering how); Intervened by negotiating a change plan and determining options / strategies for behavior change, identifying triggers, exploring social supports, and working towards setting a date to begin behavior change    Current/Ongoing Stressors and Concerns: independent, social, strong family support, financial stress, trauma hx, recently quit marijuana (9/2023)     Treatment Objective(s) Addressed in This Session:   Verbalize an accurate understanding of depression.  Verbalize an understanding of the rationale for treatment of depression.  Identify and replace thoughts and beliefs that support depression.  Increasingly verbalize hopeful and positive statements regarding self, others, and the future.  Verbalize an understanding of healthy and unhealthy emotions with the intent of increasing the use of healthy emotions to guide actions.  Verbalize an understanding of the cognitive, physiological, and behavioral components of anxiety and its treatment.  Identify, challenge, and replace biased, fearful self-talk with positive, realistic, and empowering self-talk.  Increase insight into the historical and current sources of low self-esteem.  Decrease the verbalized fear of rejection while increasing " statements of self-acceptance.  Identify positive traits and talents about self.  Identify and replace negative self-talk messages used to reinforce low self-confidence.  Identify and engage in activities that would improve self-image by being consistent with one s values.  Decrease the frequency of negative self-descriptive statements and increase frequency of positive self-descriptive statements.  Demonstrate an increased ability to identify and express personal feelings.  Articulate a plan to be proactive in trying to get identified needs met.  Positively acknowledge verbal compliments from others.  Take verbal responsibility for accomplishments without discounting.  Learn and implement:  behavioral strategies to overcome depression  problem-solving and decision-making skills  calming skills to reduce overall anxiety and manage anxiety  problem-solving strategies for realistically addressing worries    Intervention: Contacted from waitlist to offer appointment. Available and amenable to meeting. Presents with euthymic mood, congruent affect. Endorses stable symptoms. Denies new symptoms. Shares that things are going well at work in her new position; works from home two consecutive weeks, then in office one week. Continues to endorse interpersonal conflict and complex array of psychosocial stressors. Provided active listening and support as patient shared about and processed current stressors, including moving, and recent interpersonal challenges. Worked to build insight and awareness of the discrepancies between expressed goals and the likely consequences of her behaviors. Encouraged further reflection on the costs and benefits of current relationships, recreational activities, and life choices. Assisted in identifying how interpersonal disputes contribute to her distress. Explored ways of communicating feelings with others and taught effective communication techniques. Briefly checked-in for treatment plan  update. Agreed to meet biweekly or prn to continue working towards previously identified goals. Provided empathy, validation, and unconditional positive regard. Patient was openly engaged. She responded well to the interventions and was able to use the session to make sense of her feelings and experiences.      Assessments completed prior to visit:      10/18/2023     5:54 PM 11/15/2023    11:03 AM 12/20/2023     3:14 PM 1/24/2024    12:03 PM 3/18/2024     1:11 PM 4/15/2024     4:17 PM 7/23/2024     9:03 AM   PHQ-9 SCORE   PHQ-9 Total Score MyChart 9 (Mild depression) 7 (Mild depression) 3 (Minimal depression) 21 (Severe depression) 15 (Moderately severe depression) 9 (Mild depression) 7 (Mild depression)   PHQ-9 Total Score 9 7    7 3 21 15 9 7         10/18/2023     5:55 PM 11/15/2023    11:02 AM 12/20/2023     3:16 PM 1/24/2024    12:02 PM 3/18/2024     1:12 PM 4/15/2024     4:19 PM 7/23/2024     9:05 AM   YG-7 SCORE   Total Score 6 (mild anxiety) 7 (mild anxiety) 7 (mild anxiety) 15 (severe anxiety) 16 (severe anxiety) 9 (mild anxiety) 11 (moderate anxiety)   Total Score 6 7    7 7 15 16 9 11       ASSESSMENT: Current Emotional/Mental Status (status of significant symptoms):   Risk status (Self/Other harm or suicidal ideation)   Patient denies current fears or concerns for personal safety.   Patient denies current or recent suicidal ideation or behaviors.   Patient denies current or recent homicidal ideation or behaviors.   Patient denies current or recent self injurious behavior or ideation.   Patient denies other safety concerns.   Patient reports there has been no change in risk factors since their last session.     Patient reports there has been no change in protective factors since their last session.    Recommended that patient call 911 or go to the local ED should there be a change in any of these risk factors.     Appearance:   Unable to assess    Eye Contact:   Unable to assess    Psychomotor  Behavior: Unable to assess    Attitude:   Cooperative  Pleasant   Orientation:   All   Speech    Rate/Production: Normal/ Responsive    Volume:  Normal    Mood:    Normal Euthymic   Affect:    Appropriate    Thought Content:  Clear    Thought Form:  Coherent  Logical    Insight:    Fair      Medication Review: No current psychiatric medications prescribed     Medication Compliance: NA     Changes in Health Issues: None reported     Chemical Use Review:  Substance Use: Problem use continues with no change since last session, Not addressed in session      Quit marijuana September 2023; hx daily use.  Restarted marijuana late September 2023, using socially/recreationally.  4/15/24: Estimates using marijuana every two days.  7/23/24: Smoking weed daily.    Tobacco Use: No change in amount of tobacco use since last session.  Provided encouragement to quit   Patient declined discussion at this time    Diagnosis:  1. Adjustment disorder with mixed anxiety and depressed mood      Collateral Reports Completed: Not Applicable    PLAN: Evaluate recent choices, the people and activities you have in your life; what do they provide you/what are the benefits and what are the costs?            Next appt(s): self-schedule; waitlist in the interim     April JONATAN Bunn  7/23/2024                                            ______________________________________________________________________    Individual Treatment Plan    Patient's Name: Berta Oseguera  YOB: 2005    Date of Creation: 9/19/2023   Date Treatment Plan Last Reviewed/Revised: 7/23/2024; will next review 10/21/24        DSM5 Diagnoses: Adjustment Disorders  309.28 (F43.23) With mixed anxiety and depressed mood  Psychosocial/Contextual Factors: independent, social, strong family support, financial stress, trauma hx, recently quit marijuana (9/2023)  PROMIS (reviewed every 90 days): PROMIS-10 Scores      12/20/2023     3:19 PM 4/15/2024  "    4:22 PM 7/23/2024     9:09 AM   PROMIS-10 Total Score w/o Sub Scores   PROMIS TOTAL - SUBSCORES 25 24 23      Referral/Collaboration: Referral to another professional/service is not indicated at this time.    Anticipated number of session for this episode of care: 9-12 sessions  Anticipation frequency of session: Biweekly  Anticipated Duration of each session: 38-52 minutes  Treatment plan will be reviewed in 90 days or when goals have been changed.     Measurable Treatment Goal(s) related to diagnosis/functional impairment(s)    \"To gain self-love and self-confidence again, talk about a lot of stuff that's been bothering me, to build a bonding relationship with you [therapist].\"     Goal 1: Patient will experience decrease in depressive symptoms as evidenced by PHQ-9 scores.    I will know I've met my goal when I have better conversations about my life and what I'm doing, and am less sad and irritable.   12/20/23: \"Maric the same. I'm less irritable, but still .... I\"m just always worried.\"   4/15/24: \"I feel like I came out of a really dark spot, but I still feel like that spot could be easily fallen back into. I want to work on not falling into it so easily or quickly.\"     Objective A    Patient will verbalize an accurate understanding of depression.  Status: New - date: 9/19/23. Continued - date(s): 12/20/23, 4/15/24, 7/23/24.  Intervention(s): Therapist will, consistent with the treatment model, discuss how cognitive, behavioral, interpersonal, and/or other factors (e.g. family history) contribute to depression.     Objective B  Patient will verbalize an understanding of the rationale for treatment of depression.  Status: New - date: 9/19/23. Continued - date(s): 12/20/23, 4/15/24, 7/23/24.  Intervention(s): Therapist will, consistent with the treatment model, discuss how change in cognitive, behavioral, interpersonal, and/or other factors can help alleviate depression and return to previous level of " effective functioning.     Objective C    Patient will identify and replace thoughts and beliefs that support depression.  Status: New - date: 9/19/23. Continued - date(s): 12/20/23, 4/15/24, 7/23/24.  Intervention(s): Therapist will conduct cognitive-behavioral therapy, first conveying the connection among cognition, depressive feelings, and actions. Assign the patient to self-monitor thoughts, feelings, and actions in a journal; process the journal material to identify, challenge, and change depressive thinking patterns and replace them with reality-based thoughts. Identify, challenge, and change depressive thinking patterns and replace them with reality-based thoughts. Assign  behavioral experiments  in and outside of sessions that test depressive automatic thoughts and beliefs against more reality-based ones toward a sustained shift reflecting hopefulness, motivation, confidence, and an improved self-concept. Facilitate and reinforce the patient's shift from biased depressive self-talk and beliefs to reality-based alternatives that enhance emotion regulation and increase adaptive functioning. Explore and restructure underlying assumptions and schema reflected in biased self-talk that may place the patient at risk for relapse or recurrence; help build the patient's self-concept from unlovable, worthless, helpless, or incompetent to empowering alternatives.    Objective D  Patient will learn and implement behavioral strategies to overcome depression.  Status: New - date: 9/19/23. Continued - date(s): 12/20/23, 4/15/24, 7/23/24.  Intervention(s): Therapist will engage the patient in  behavioral activation,  increasing his/her/their activity level and contact with sources of reward, while identifying processes that inhibit or obstruct activation; use instruction, rehearsal, role-playing, role reversal, as needed, to facilitate activity in the patient's daily life; reinforce success, problem-solve obstacles. Assist  the patient in developing skills that increase the likelihood of deriving pleasure and meaning from behavioral activation (e.g., assertiveness skills, developing an exercise plan, less internal/more external focus, increased social involvement); reinforce success; problem-solve obstacles toward sustained, rewarding activation.    Objective E  Patient will increasingly verbalize hopeful and positive statements regarding self, others, and the future.  Status: New - date: 9/19/23. Continued - date(s): 12/20/23, 4/15/24, 7/23/24.  Intervention(s): Therapist will teach more about depression and how to recognize and accept some sadness as a normal variation in feeling. Assign the patient to write positive affirmation statements regarding themselves and the future.      Objective F  Patient will learn and implement problem-solving and decision-making skills.                     Status: New - date: 9/19/23. Continued - date(s): 12/20/23, 4/15/24, 7/23/24.  Intervention(s): Therapist will conduct problem-solving therapy using techniques such as psychoeducation, modeling, and role-playing to teach patient problem-solving skills (i.e., defining a problem specifically, generating possible solutions, evaluating the pros and cons of each solution, selecting and implementing a plan of action, evaluating the efficacy of the plan, accepting or revising the plan); accepting or revising the plan); role-play application of the problem-solving skill to a real life issue. Encourage the development of a positive problem orientation in which problems and solving them are viewed as a natural part of life and not something to be feared, despaired, or avoided; reinforce successes toward sustained, effective use.    Objective G    Patient will verbalize an understanding of healthy and unhealthy emotions with the intent of increasing the use of healthy emotions to guide actions.  Status: New - date: 9/19/23. Continued - date(s): 12/20/23,  "4/15/24, 7/23/24.  Intervention(s): Therapist will use a process-experiential approach consistent with emotion-focused therapy to create a safe, nurturing environment in which the patient can process emotions, learning to identify and regulate unhealthy feelings and to generate more adaptive ones that then guide actions.     Goal 2: Patient will experience decrease in anxiety symptoms as evidenced by YG-7 scores.   I will know I've met my goal when I am spending more time doing things just for myself, like being outside or going for walks, and doing things, not just sitting with friends looking at social media.    12/20/23: \"This has been better. I hang out with my siblings and family a lot more.\" Worrying more, always worried about the next bill, how I'm gonna pay it, how I'm gonna pay for food and gas.\"   4/15/24: \"It's not totally under control, sometimes it gets uncomfortable.\"    Objective A    Patient will verbalize an understanding of the cognitive, physiological, and behavioral components of anxiety and its treatment.  Status: New - date: 9/19/23. Continued - date(s): 12/20/23, 4/15/24, 7/23/24.  Intervention(s): Therapist will discuss how anxiety typically involves excessive worry about unrealistically appraised threats, various bodily expressions of overarousal, hypervigilance, and avoidance of what is threatening that interact to maintain the problem. Discuss how treatment targets worry, anxiety symptoms, and avoidance to help the patient manage worry effectively, reduce overarousal, eliminate unnecessary avoidance, and reengage in rewarding activities.    Objective B  Patient will verbalize an understanding of the role that thinking plays in worry, anxiety, and avoidance.  Status: New - date: 9/19/23. Continued - date(s): 12/20/23, 4/15/24, 7/23/24.  Intervention(s): Therapist will discuss examples demonstrating how unproductive worry typically overestimates the probability of threats and underestimates " or overlooks the patient's ability to manage realistic demands. Assist the patient in analyzing worries by examining potential biases such as the probability of the negative expectation occurring, the real consequences of it occurring, ability to control the outcome, the worst possible outcome, and ability to accept it. Help the patient gain insight into the notion that worry creates acute and/or chronic anxious apprehension, leading to avoidance that precludes finding solutions to problems.    Objective C  Patient will identify, challenge, and replace biased, fearful self-talk with positive, realistic, and empowering self-talk.  Status: New - date: 9/19/23. Continued - date(s): 12/20/23, 4/15/24, 7/23/24.  Intervention(s): Therapist will utilize techniques from cognitive behavioral therapies including intolerance of uncertainty and metacognitive therapies explore the patient's self-talk, underlying assumptions, schema, or metacognition that mediate anxiety; assist the patient in challenging and changing biases; conduct behavioral experiments to test biased versus unbiased predictions toward dispelling unproductive worry and increasing self-confidence in addressing the subject of worry. Assign homework exercises to identify fearful self-talk, identify biases in the self-talk, generate alternatives, and test them through behavioral experiments; review and reinforce success, providing corrective feedback toward improvement.    Objective D  Patient will learn and implement calming skills to reduce overall anxiety and manage anxiety.  Status: New - date: 9/19/23. Continued - date(s): 12/20/23, 4/15/24, 7/23/24.  Intervention(s): Therapist will teach calming/relaxation/mindfulness skills (e.g., applied relaxation, progressive muscle relaxation, cue controlled relaxation; mindful breathing; biofeedback) and how to discriminate better between relaxation and tension; teach the patient how to apply these skills to daily  "life. Assign homework in which he/she/they practice calming/relaxation/mindfulness skills, gradually applying them progressively from non-anxiety-provoking to anxiety-provoking situations; review and reinforce success; resolve obstacles toward sustained implementation.    Objective E  Patient will learn and implement problem-solving strategies for realistically addressing worries.  Status: New - date: 9/19/23. Continued - date(s): 12/20/23, 4/15/24, 7/23/24.  Intervention(s): Therapist will teach problem-solving/solution-finding strategies to replace unproductive worry involving specifically defining a problem, generating options for addressing it, evaluating the pros and cons of each option, selecting and implementing an action plan, and reevaluating and refining the action.    Goal 3: Patient will establish an inward sense of self-worth, confidence, and competence.    I will know I've met my goal when I am wearing something other than sweats outside of work.    12/20/23: No change. Wears sweats a lot in winter because of cold. Will take more pride in appearance. Would like to pick at pimples less.  4/15/24: \"I feel like my self-confidence is better, but still not to where I want it to be.\"      Objective A  Patient will increase insight into the historical and current sources of low self-esteem.  Status: New - date: 9/19/23. Continued - date(s): 12/20/23, 4/15/24, 7/23/24.  Intervention(s): Therapist will help patient become aware of fear of rejection and its connection with past rejection or abandonment experiences; begin to contrast past experiences of pain with present experiences of acceptance and competence. Discuss, emphasize, and interpret the patient's incidents of abuse and how they have affected feelings about self.    Objective B  Patient will decrease the verbalized fear of rejection while increasing statements of self-acceptance.  Status: New - date: 9/19/23. Continued - date(s): 12/20/23, 4/15/24, " 7/23/24.  Intervention(s): Therapist will assign the patient to write positive affirmation statements regarding themselves and the future. Verbally reinforce the patient s use of positive statements of confidence and accomplishments.    Objective C  Patient will identify positive traits and talents about self.  Status: New - date: 9/19/23. Continued - date(s): 12/20/23, 4/15/24, 7/23/24.  Intervention(s): Therapist will assign patient the exercise of identifying his/her/their positive physical characteristics in a mirror to help him/her/them become more comfortable with himself/herself/themselves. Ask the patient to keep building a list of positive traits and have him/her/them read the list at the beginning and end of each session     Objective D  Patient will identify and replace negative self-talk messages used to reinforce low self-confidence.  Status: New - date: 9/19/23. Continued - date(s): 12/20/23, 4/15/24, 7/23/24.  Intervention(s): Therapist will help the patient identify distorted, negative beliefs about self and the world and replace these messages with more realistic, affirmative messages. Ask the patient to complete and process self-esteem-building exercises from recommended self-help books (e.g., Ten Days to Self Esteem by Maeve; The Self-Esteem  by Haroon Haines [place], and Ayad; 10 Simple Solutions for Building Self-Esteem by Mary Jane).     Objective E  Patient will identify and engage in activities that would improve self-image by being consistent with one s values.  Status: New - date: 9/19/23. Continued - date(s): 12/20/23, 4/15/24, 7/23/24.  Intervention(s): Therapist will help patient analyze his/her/their values and the congruence or incongruence between them and the patient s daily activities. Identify and assign activities congruent with the patient s values; process them toward improving self-concept and self-esteem.    Objective F  Patient will decrease the frequency of  negative self-descriptive statements and increase frequency of positive self-descriptive statements.  Status: New - date: 9/19/23. Continued - date(s): 12/20/23, 4/15/24, 7/23/24.  Intervention(s): Therapist will assist the patient in becoming aware of how he/she/they express or act out negative self-feelings. Help patient reframe his/her/their negative self-assessment. Assist in developing positive self-talk as a way of boosting confidence and self-image.    Objective G    Patient will demonstrate an increased ability to identify and express personal feelings.  Status: New - date: 9/19/23. Continued - date(s): 12/20/23, 4/15/24, 7/23/24.  Intervention(s): Therapist will assist patient in identifying and labeling emotions.    Objective H  Patient will articulate a plan to be proactive in trying to get identified needs met.  Status: New - date: 9/19/23. Continued - date(s): 12/20/23, 4/15/24, 7/23/24.  Intervention(s): Therapist will assist the patient in identifying and verbalizing needs, met and unmet. Assist the patient in developing a specific action plan to get each need met.    Objective I    Patient will positively acknowledge verbal compliments from others.  Status: New - date: 9/19/23. Continued - date(s): 12/20/23, 4/15/24, 7/23/24.  Intervention(s): Therapist will assign patient to be aware of and acknowledge graciously (without discounting) praise and compliments from others.    Objective J  Patient will take verbal responsibility for accomplishments without discounting.  Status: New - date: 9/19/23. Continued - date(s): 12/20/23, 4/15/24, 7/23/24.  Intervention(s): Therapist will ask patient list accomplishments; process the integration of these into his/her/their self-image.    Goal 4: Patient will achieve a satisfactory level of balance, structure, and intimacy in personal life.    I will know I've met my goal when I am budgeting, going grocery shopping, have more of a routine, and focus more on my  physical health (eat more regularly).       Objective A                  Patient will acknowledge procrastination and the need to reduce it.  Status: New - date: 4/15/24. Continued - date(s): 7/23/24.  Intervention(s): Therapist will assist in identifying positives and negatives of procrastinating toward the goal of engaging the patient in staying focused.      Objective B  Patient will learn and implement skills to reduce procrastination.  Status: New - date: 4/15/24. Continued - date(s): 7/23/24.  Intervention(s): Therapist will teach to apply new problem-solving skills to planning as a first step in overcoming procrastination; for each plan, break it down into manageable time-limited steps to reduce the influence of distractibility. Assign homework asking the patient to accomplish identified tasks without procrastination using the techniques learned in therapy); and review and provide corrective feedback toward improving the skill and decreasing procrastination.      Objective C  Patient will learn and implement organization and planning skills.  Status: New - date: 4/15/24. Continued - date(s): 7/23/24.  Intervention(s): Therapist will teach organization and planning skills including the routine use of a calendar and daily task list. Develop with the patient a procedure for classifying and managing mail and other papers. Teach problem-solving skills (i.e., identify problem, brainstorm all possible options, evaluate the pros and cons of each option, select best option, implement a course of action, and evaluate results) as an approach to planning; for each plan, break it down into manageable time-limited steps to reduce the influence of distractibility.    Patient has reviewed and agreed to the above plan.    JONATAN Knight  July 23, 2024

## 2024-08-14 ENCOUNTER — TELEPHONE (OUTPATIENT)
Dept: NURSING | Facility: CLINIC | Age: 19
End: 2024-08-14

## 2024-08-14 ENCOUNTER — OFFICE VISIT (OUTPATIENT)
Dept: MIDWIFE SERVICES | Facility: CLINIC | Age: 19
End: 2024-08-14
Payer: COMMERCIAL

## 2024-08-14 VITALS
DIASTOLIC BLOOD PRESSURE: 96 MMHG | TEMPERATURE: 97.7 F | RESPIRATION RATE: 16 BRPM | SYSTOLIC BLOOD PRESSURE: 134 MMHG | HEART RATE: 69 BPM | BODY MASS INDEX: 27.91 KG/M2 | WEIGHT: 189 LBS

## 2024-08-14 DIAGNOSIS — N76.0 BV (BACTERIAL VAGINOSIS): ICD-10-CM

## 2024-08-14 DIAGNOSIS — N92.6 MISSED PERIOD: Primary | ICD-10-CM

## 2024-08-14 DIAGNOSIS — Z33.2 TERMINATION OF PREGNANCY (FETUS): Primary | ICD-10-CM

## 2024-08-14 DIAGNOSIS — N89.8 VAGINAL ODOR: ICD-10-CM

## 2024-08-14 DIAGNOSIS — B96.89 BV (BACTERIAL VAGINOSIS): ICD-10-CM

## 2024-08-14 DIAGNOSIS — Z11.3 SCREENING EXAMINATION FOR STI: ICD-10-CM

## 2024-08-14 DIAGNOSIS — Z32.01 PREGNANCY TEST POSITIVE: ICD-10-CM

## 2024-08-14 LAB
CLUE CELLS: PRESENT
HCG UR QL: POSITIVE
TRICHOMONAS, WET PREP: ABNORMAL
WBC'S/HIGH POWER FIELD, WET PREP: ABNORMAL
YEAST, WET PREP: ABNORMAL

## 2024-08-14 PROCEDURE — 87210 SMEAR WET MOUNT SALINE/INK: CPT | Performed by: ADVANCED PRACTICE MIDWIFE

## 2024-08-14 PROCEDURE — 99214 OFFICE O/P EST MOD 30 MIN: CPT | Performed by: ADVANCED PRACTICE MIDWIFE

## 2024-08-14 PROCEDURE — 87491 CHLMYD TRACH DNA AMP PROBE: CPT | Performed by: ADVANCED PRACTICE MIDWIFE

## 2024-08-14 PROCEDURE — 81025 URINE PREGNANCY TEST: CPT | Performed by: ADVANCED PRACTICE MIDWIFE

## 2024-08-14 PROCEDURE — 87591 N.GONORRHOEAE DNA AMP PROB: CPT | Performed by: ADVANCED PRACTICE MIDWIFE

## 2024-08-14 RX ORDER — METRONIDAZOLE 500 MG/1
500 TABLET ORAL 2 TIMES DAILY
Qty: 14 TABLET | Refills: 0 | Status: SHIPPED | OUTPATIENT
Start: 2024-08-14 | End: 2024-08-21

## 2024-08-14 NOTE — TELEPHONE ENCOUNTER
LMP 24 = 6w2d as of 24    Pre-Medication  Assessment:     Berta Oseguera    When was the first day of your last period? Patient's last menstrual period was 2024 (within days). Patient is sure of LMP date.    When was your positive pregnancy test? 24       Was the test more than 52 days ago (before 24)? No    Were you using hormonal birth control, an IUD or emergency contraception when you got pregnant? YES - ULTRASOUND NEEDED PLAN B    Have you ever been diagnosed with an ectopic pregnancy? No    Have you ever had a tubal ligation or sterilization procedure? No    Have you ever been diagnosed with pelvic inflammatory disease? No    Are you having one-sided pelvic pain? No    How long are your typical menstrual cycles /  How many days from the start of one period to the start of the next one?  Typically bleed for 6 day, and come every month (last week or first week of each month)     During the past 3 months, has the time between periods varied from your typical cycles by more than a few days? No, cycles have been regular.    Was LMP more than 10 weeks (70 days) ago (before 24)? No    Did your last period start earlier or later than you would have expected? No    Was your last period shorter than usual? No    Was the amount of bleeding/cramping in your last period normal for you? Yes    Have you had any other recent bleeding that was not normal for you? YES - ULTRASOUND NEEDED. Spotting when she wipes, bright red, started 24    Have you ever been diagnosed with:    An allergy or intolerance to mifepristone or misoprostol?  No    Chronic renal failure?  No    Hemorrhagic disorders? No    Inherited porphyria? No    Have you used systemic corticosteroid therapy long term?  No    Are you currently on anticoagulation therapy (excluding aspirin)?  No    Based on the responses given by the patient, an ultrasound is indicated.    Patient denies all contraindications,  will proceed with scheduling medication termination of pregnancy appointment and ultrasound appointment.      Elo Finney RN

## 2024-08-14 NOTE — PROGRESS NOTES
SUBJECTIVE:   19 year old female complains of  vaginal discharge with odor .  She had unplanned, unprotected intercourse in July.  She took Plan B.  Her LMP was .  She is now having some bleeding and cramping.   Denies significant pelvic pain or  fever. No UTI symptoms. She would like to be checked for GC/CT and declines blood tests.  She says that she is in a hurry to get back to work.  Her pap is due and she does not want to have it today.    Patient's last menstrual period was 2024 (within days).    OBJECTIVE:   She appears well, afebrile.  She is on her laptop computer.  Little eye contact. Tearful with results.    Exam offered and declined.   She wanted to self collect wet prep and GC/CT.    UPT positive.    Wet Prep    ASSESSMENT/Plan:    (N92.6) Missed period  (primary encounter diagnosis)  Plan: HCG qualitative urine    (Z11.3) Screening examination for STI  Plan: Wet prep - Clinic Collect, Chlamydia         trachomatis/Neisseria gonorrhoeae by PCR -         Clinic Collect  Wet prep negative for Trich  GC/CT pending  She declined blood tests, but I urged her to have syphilis and HIV testing done if any testing was positive.     (N89.8) Vaginal odor  Plan: Wet prep - Clinic Collect, Chlamydia         trachomatis/Neisseria gonorrhoeae by PCR -         Clinic Collect  Wet prep positive for BV -please see below     (Z32.01) Pregnancy test positive  Plan:  Pregnancy test positive. She is visibly upset and says that she would like medication .  I discussed next steps with clinic RN and clinic RN will call her today.  Patient notified.      (N76.0,  B96.89) BV (bacterial vaginosis)  Plan: metroNIDAZOLE (FLAGYL) 500 MG tablet

## 2024-08-15 ENCOUNTER — TELEPHONE (OUTPATIENT)
Dept: OBGYN | Facility: CLINIC | Age: 19
End: 2024-08-15

## 2024-08-15 LAB
C TRACH DNA SPEC QL PROBE+SIG AMP: NEGATIVE
N GONORRHOEA DNA SPEC QL NAA+PROBE: NEGATIVE

## 2024-08-15 NOTE — TELEPHONE ENCOUNTER
Pt missed US needed prior to a TOP appointment. Needs to r/s both, as the appt was also booked incorrectly with a provider who does not perform TOP. LVM to r/s both appts 8/15 SH

## 2024-08-15 NOTE — TELEPHONE ENCOUNTER
Patient was initially scheduled for MTOP for 8/16. Message from MD today that she does not do MTOP - nurse manager made aware of issue in scheduling visit type.     RN called Berta to inform her that the Friday appointment needs to be rescheduled.   RN reviewed open appointments for next week Monday, Tuesday, and Wednesday. Patient highly prefers to come to  or [facility] location. No openings at either location for over 4 weeks, outside of MTOP window.   Patient is scheduled for US today at  and asked to have help scheduling her MTOP appointment when she comes to the clinic.     RN spoke to RD reception and RN in the clinic informing them that patient wants to schedule her TOP appointment when she is there for her US later today.    Mercedes PAULA RN   [facility] Triage RN

## 2024-08-16 NOTE — TELEPHONE ENCOUNTER
Called pt, got US r/s. Pt had question for nurses and was also a bit unsure about scheduling the consult due to a very busy schedule, but knowing she needs to get it in. Let pt know I would reach out to nurses regarding question and that they could route it back to us to proceed with scheduling so that she has a bit of time to think about it. Pt good with this plan. 8/16     For the RNs: question is regarding if the process will be painful or how painful it will be. Pt is concerned as she is moving next week and is worried about being in pain during that.

## 2024-08-19 NOTE — TELEPHONE ENCOUNTER
Called patient and discussed cramping following MTOP - asked if she would like to proceed with scheduling.  Patient declined scheduling at this time.    Advised to call back if she changes her mind.    Eusebia Block RN

## 2024-08-19 NOTE — TELEPHONE ENCOUNTER
Per SUKUMAR and Eusebia, pt needs to have the consult scheduled to go ahead with the US. Called pt back, who restated that she was still not sure when she could schedule the consult. When mentioning that we needed it before we could do the US, she let us know to cancel it and asked if we could stop calling her. Let pt know the US was cancelled and that we would wait for if and when she was ready to book those appts. 8/19 SH

## 2024-09-12 ENCOUNTER — VIRTUAL VISIT (OUTPATIENT)
Dept: PSYCHOLOGY | Facility: CLINIC | Age: 19
End: 2024-09-12
Payer: COMMERCIAL

## 2024-09-12 DIAGNOSIS — F43.23 ADJUSTMENT DISORDER WITH MIXED ANXIETY AND DEPRESSED MOOD: Primary | ICD-10-CM

## 2024-09-12 PROCEDURE — 90837 PSYTX W PT 60 MINUTES: CPT | Mod: 93 | Performed by: SOCIAL WORKER

## 2024-09-12 NOTE — PROGRESS NOTES
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 each need met.    Objective I    Patient will positively acknowledge verbal compliments from others.  Status: New - date: 9/19/23. Continued - date(s): 12/20/23, 4/15/24, 7/23/24.  Intervention(s): Therapist will assign patient to be aware of and acknowledge graciously (without discounting) praise and compliments from others.    Objective J  Patient will take verbal responsibility for accomplishments without discounting.  Status: New - date: 9/19/23. Continued - date(s): 12/20/23, 4/15/24, 7/23/24.  Intervention(s): Therapist will ask patient list accomplishments; process the integration of these into his/her/their self-image.    Goal 4: Patient will achieve a satisfactory level of balance, structure, and intimacy in personal life.    I will know I've met my goal when I am budgeting, going grocery shopping, have more of a routine, and focus more on my physical health (eat more regularly).       Objective A                  Patient will acknowledge procrastination and the need to reduce it.  Status: New - date: 4/15/24. Continued - date(s): 7/23/24.  Intervention(s): Therapist will assist in identifying positives and negatives of procrastinating toward the goal of engaging the patient in staying focused.      Objective B  Patient will learn and  implement skills to reduce procrastination.  Status: New - date: 4/15/24. Continued - date(s): 7/23/24.  Intervention(s): Therapist will teach to apply new problem-solving skills to planning as a first step in overcoming procrastination; for each plan, break it down into manageable time-limited steps to reduce the influence of distractibility. Assign homework asking the patient to accomplish identified tasks without procrastination using the techniques learned in therapy); and review and provide corrective feedback toward improving the skill and decreasing procrastination.      Objective C  Patient will learn and implement organization and planning skills.  Status: New - date: 4/15/24. Continued - date(s): 7/23/24.  Intervention(s): Therapist will teach organization and planning skills including the routine use of a calendar and daily task list. Develop with the patient a procedure for classifying and managing mail and other papers. Teach problem-solving skills (i.e., identify problem, brainstorm all possible options, evaluate the pros and cons of each option, select best option, implement a course of action, and evaluate results) as an approach to planning; for each plan, break it down into manageable time-limited steps to reduce the influence of distractibility.    Patient has reviewed and agreed to the above plan.    JONATAN Knight  July 23, 2024

## 2024-09-26 ENCOUNTER — VIRTUAL VISIT (OUTPATIENT)
Dept: PSYCHOLOGY | Facility: CLINIC | Age: 19
End: 2024-09-26
Payer: COMMERCIAL

## 2024-09-26 DIAGNOSIS — F43.23 ADJUSTMENT DISORDER WITH MIXED ANXIETY AND DEPRESSED MOOD: Primary | ICD-10-CM

## 2024-09-26 PROCEDURE — 90834 PSYTX W PT 45 MINUTES: CPT | Mod: 93 | Performed by: SOCIAL WORKER

## 2024-09-26 ASSESSMENT — PATIENT HEALTH QUESTIONNAIRE - PHQ9
10. IF YOU CHECKED OFF ANY PROBLEMS, HOW DIFFICULT HAVE THESE PROBLEMS MADE IT FOR YOU TO DO YOUR WORK, TAKE CARE OF THINGS AT HOME, OR GET ALONG WITH OTHER PEOPLE: EXTREMELY DIFFICULT
SUM OF ALL RESPONSES TO PHQ QUESTIONS 1-9: 19
SUM OF ALL RESPONSES TO PHQ QUESTIONS 1-9: 19

## 2024-09-26 ASSESSMENT — ANXIETY QUESTIONNAIRES
7. FEELING AFRAID AS IF SOMETHING AWFUL MIGHT HAPPEN: MORE THAN HALF THE DAYS
GAD7 TOTAL SCORE: 10
8. IF YOU CHECKED OFF ANY PROBLEMS, HOW DIFFICULT HAVE THESE MADE IT FOR YOU TO DO YOUR WORK, TAKE CARE OF THINGS AT HOME, OR GET ALONG WITH OTHER PEOPLE?: SOMEWHAT DIFFICULT

## 2024-09-26 NOTE — PROGRESS NOTES
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 Therapist will assign patient to be aware of and acknowledge graciously (without discounting) praise and compliments from others.    Objective J  Patient will take verbal responsibility for accomplishments without discounting.  Status: New - date: 9/19/23. Continued - date(s): 12/20/23, 4/15/24, 7/23/24.  Intervention(s): Therapist will ask patient list accomplishments; process the integration of these into his/her/their self-image.    Goal 4: Patient will achieve a satisfactory level of balance, structure, and intimacy in personal life.    I will know I've met my goal when I am budgeting, going grocery shopping, have more of a routine, and focus more on my physical health (eat more regularly).       Objective A                  Patient will acknowledge procrastination and the need to reduce it.  Status: New - date: 4/15/24. Continued - date(s): 7/23/24.  Intervention(s): Therapist will assist in identifying positives and negatives of procrastinating toward the goal of engaging the patient in staying focused.      Objective B  Patient will learn and implement skills to reduce procrastination.  Status: New - date: 4/15/24. Continued - date(s): 7/23/24.  Intervention(s): Therapist will teach to apply new problem-solving skills to planning as a first step in overcoming procrastination; for  each plan, break it down into manageable time-limited steps to reduce the influence of distractibility. Assign homework asking the patient to accomplish identified tasks without procrastination using the techniques learned in therapy); and review and provide corrective feedback toward improving the skill and decreasing procrastination.      Objective C  Patient will learn and implement organization and planning skills.  Status: New - date: 4/15/24. Continued - date(s): 7/23/24.  Intervention(s): Therapist will teach organization and planning skills including the routine use of a calendar and daily task list. Develop with the patient a procedure for classifying and managing mail and other papers. Teach problem-solving skills (i.e., identify problem, brainstorm all possible options, evaluate the pros and cons of each option, select best option, implement a course of action, and evaluate results) as an approach to planning; for each plan, break it down into manageable time-limited steps to reduce the influence of distractibility.    Patient has reviewed and agreed to the above plan.    Loree Mendenhall, JONATAN  July 23, 2024

## 2024-10-01 ENCOUNTER — TELEPHONE (OUTPATIENT)
Dept: PSYCHOLOGY | Facility: CLINIC | Age: 19
End: 2024-10-01
Payer: COMMERCIAL

## 2024-10-14 ENCOUNTER — VIRTUAL VISIT (OUTPATIENT)
Dept: PSYCHOLOGY | Facility: CLINIC | Age: 19
End: 2024-10-14
Payer: COMMERCIAL

## 2024-10-14 DIAGNOSIS — F43.23 ADJUSTMENT DISORDER WITH MIXED ANXIETY AND DEPRESSED MOOD: Primary | ICD-10-CM

## 2024-10-14 PROCEDURE — 90837 PSYTX W PT 60 MINUTES: CPT | Mod: 93 | Performed by: SOCIAL WORKER

## 2024-10-14 NOTE — PROGRESS NOTES
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 get identified needs met.  Status: New - date: 9/19/23. Continued - date(s): 12/20/23, 4/15/24, 7/23/24.  Intervention(s): Therapist will assist the patient in identifying and verbalizing needs, met and unmet. Assist the patient in developing a specific action plan to get each need met.    Objective I    Patient will positively acknowledge verbal compliments from others.  Status: New - date: 9/19/23. Continued - date(s): 12/20/23, 4/15/24, 7/23/24.  Intervention(s): Therapist will assign patient to be aware of and acknowledge graciously (without discounting) praise and compliments from others.    Objective J  Patient will take verbal responsibility for accomplishments without discounting.  Status: New - date: 9/19/23. Continued - date(s): 12/20/23, 4/15/24, 7/23/24.  Intervention(s): Therapist will ask patient list accomplishments; process the integration of these into his/her/their self-image.    Goal 4: Patient will achieve a satisfactory level of balance, structure, and intimacy in personal life.    I will know I've met my goal when I am budgeting, going grocery shopping, have more of a routine, and focus more on my physical health (eat more regularly).       Objective A                  Patient will acknowledge procrastination and the need to  reduce it.  Status: New - date: 4/15/24. Continued - date(s): 7/23/24.  Intervention(s): Therapist will assist in identifying positives and negatives of procrastinating toward the goal of engaging the patient in staying focused.      Objective B  Patient will learn and implement skills to reduce procrastination.  Status: New - date: 4/15/24. Continued - date(s): 7/23/24.  Intervention(s): Therapist will teach to apply new problem-solving skills to planning as a first step in overcoming procrastination; for each plan, break it down into manageable time-limited steps to reduce the influence of distractibility. Assign homework asking the patient to accomplish identified tasks without procrastination using the techniques learned in therapy); and review and provide corrective feedback toward improving the skill and decreasing procrastination.      Objective C  Patient will learn and implement organization and planning skills.  Status: New - date: 4/15/24. Continued - date(s): 7/23/24.  Intervention(s): Therapist will teach organization and planning skills including the routine use of a calendar and daily task list. Develop with the patient a procedure for classifying and managing mail and other papers. Teach problem-solving skills (i.e., identify problem, brainstorm all possible options, evaluate the pros and cons of each option, select best option, implement a course of action, and evaluate results) as an approach to planning; for each plan, break it down into manageable time-limited steps to reduce the influence of distractibility.    Patient has reviewed and agreed to the above plan.    JONATAN Knight  July 23, 2024

## 2024-10-23 ENCOUNTER — VIRTUAL VISIT (OUTPATIENT)
Dept: PSYCHOLOGY | Facility: CLINIC | Age: 19
End: 2024-10-23
Payer: COMMERCIAL

## 2024-10-23 DIAGNOSIS — F43.23 ADJUSTMENT DISORDER WITH MIXED ANXIETY AND DEPRESSED MOOD: Primary | ICD-10-CM

## 2024-10-23 PROCEDURE — 90837 PSYTX W PT 60 MINUTES: CPT | Mod: 93 | Performed by: SOCIAL WORKER

## 2024-10-23 NOTE — PROGRESS NOTES
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 verbal compliments from others.  Status: New - date: 9/19/23. Continued - date(s): 12/20/23, 4/15/24, 7/23/24, 10/23/24.  Intervention(s): Therapist will assign patient to be aware of and acknowledge graciously (without discounting) praise and compliments from others.    Objective J  Patient will take verbal responsibility for accomplishments without discounting.  Status: New - date: 9/19/23. Continued - date(s): 12/20/23, 4/15/24, 7/23/24, 10/23/24.  Intervention(s): Therapist will ask patient list accomplishments; process the integration of these into his/her/their self-image.    Goal 4: Patient will achieve a satisfactory level of balance, structure, and intimacy in personal life.    I will know I've met my goal when I am budgeting, going grocery shopping, have more of a routine, and focus more on my physical health (eat more regularly).       Objective A                  Patient will acknowledge procrastination and the need to reduce it.  Status: New - date: 4/15/24. Continued - date(s): 7/23/24, 10/23/24.  Intervention(s): Therapist will assist in identifying positives and negatives of procrastinating toward the goal of engaging the patient in staying focused.      Objective B  Patient will learn and implement skills to reduce procrastination.  Status: New - date: 4/15/24. Continued - date(s):  7/23/24, 10/23/24.  Intervention(s): Therapist will teach to apply new problem-solving skills to planning as a first step in overcoming procrastination; for each plan, break it down into manageable time-limited steps to reduce the influence of distractibility. Assign homework asking the patient to accomplish identified tasks without procrastination using the techniques learned in therapy); and review and provide corrective feedback toward improving the skill and decreasing procrastination.      Objective C  Patient will learn and implement organization and planning skills.  Status: New - date: 4/15/24. Continued - date(s): 7/23/24, 10/23/24.  Intervention(s): Therapist will teach organization and planning skills including the routine use of a calendar and daily task list. Develop with the patient a procedure for classifying and managing mail and other papers. Teach problem-solving skills (i.e., identify problem, brainstorm all possible options, evaluate the pros and cons of each option, select best option, implement a course of action, and evaluate results) as an approach to planning; for each plan, break it down into manageable time-limited steps to reduce the influence of distractibility.    Patient has reviewed and agreed to the above plan.     Loree Mendenhall, ALANASW  October 23, 2024

## 2024-11-05 ENCOUNTER — VIRTUAL VISIT (OUTPATIENT)
Dept: PSYCHOLOGY | Facility: CLINIC | Age: 19
End: 2024-11-05
Payer: COMMERCIAL

## 2024-11-05 DIAGNOSIS — F43.23 ADJUSTMENT DISORDER WITH MIXED ANXIETY AND DEPRESSED MOOD: Primary | ICD-10-CM

## 2024-11-05 PROCEDURE — 90837 PSYTX W PT 60 MINUTES: CPT | Mod: 93 | Performed by: SOCIAL WORKER

## 2024-11-07 ENCOUNTER — VIRTUAL VISIT (OUTPATIENT)
Dept: PSYCHOLOGY | Facility: CLINIC | Age: 19
End: 2024-11-07
Payer: COMMERCIAL

## 2024-11-07 DIAGNOSIS — F43.23 ADJUSTMENT DISORDER WITH MIXED ANXIETY AND DEPRESSED MOOD: Primary | ICD-10-CM

## 2024-11-07 PROCEDURE — 90837 PSYTX W PT 60 MINUTES: CPT | Mod: 93 | Performed by: SOCIAL WORKER

## 2024-11-07 ASSESSMENT — ANXIETY QUESTIONNAIRES
2. NOT BEING ABLE TO STOP OR CONTROL WORRYING: SEVERAL DAYS
7. FEELING AFRAID AS IF SOMETHING AWFUL MIGHT HAPPEN: MORE THAN HALF THE DAYS
GAD7 TOTAL SCORE: 12
3. WORRYING TOO MUCH ABOUT DIFFERENT THINGS: SEVERAL DAYS
GAD7 TOTAL SCORE: 12
8. IF YOU CHECKED OFF ANY PROBLEMS, HOW DIFFICULT HAVE THESE MADE IT FOR YOU TO DO YOUR WORK, TAKE CARE OF THINGS AT HOME, OR GET ALONG WITH OTHER PEOPLE?: SOMEWHAT DIFFICULT
1. FEELING NERVOUS, ANXIOUS, OR ON EDGE: SEVERAL DAYS
7. FEELING AFRAID AS IF SOMETHING AWFUL MIGHT HAPPEN: MORE THAN HALF THE DAYS
4. TROUBLE RELAXING: NEARLY EVERY DAY
GAD7 TOTAL SCORE: 12
6. BECOMING EASILY ANNOYED OR IRRITABLE: NEARLY EVERY DAY
5. BEING SO RESTLESS THAT IT IS HARD TO SIT STILL: SEVERAL DAYS
IF YOU CHECKED OFF ANY PROBLEMS ON THIS QUESTIONNAIRE, HOW DIFFICULT HAVE THESE PROBLEMS MADE IT FOR YOU TO DO YOUR WORK, TAKE CARE OF THINGS AT HOME, OR GET ALONG WITH OTHER PEOPLE: SOMEWHAT DIFFICULT

## 2024-11-07 NOTE — PROGRESS NOTES
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 Assist the patient in developing a specific action plan to get each need met.    Objective I    Patient will positively acknowledge verbal compliments from others.  Status: New - date: 9/19/23. Continued - date(s): 12/20/23, 4/15/24, 7/23/24, 10/23/24.  Intervention(s): Therapist will assign patient to be aware of and acknowledge graciously (without discounting) praise and compliments from others.    Objective J  Patient will take verbal responsibility for accomplishments without discounting.  Status: New - date: 9/19/23. Continued - date(s): 12/20/23, 4/15/24, 7/23/24, 10/23/24.  Intervention(s): Therapist will ask patient list accomplishments; process the integration of these into his/her/their self-image.    Goal 4: Patient will achieve a satisfactory level of balance, structure, and intimacy in personal life.    I will know I've met my goal when I am budgeting, going grocery shopping, have more of a routine, and focus more on my physical health (eat more regularly).       Objective A                  Patient will acknowledge procrastination and the need to reduce it.  Status: New - date: 4/15/24. Continued - date(s): 7/23/24, 10/23/24.  Intervention(s): Therapist will assist in identifying positives and negatives of procrastinating  toward the goal of engaging the patient in staying focused.      Objective B  Patient will learn and implement skills to reduce procrastination.  Status: New - date: 4/15/24. Continued - date(s): 7/23/24, 10/23/24.  Intervention(s): Therapist will teach to apply new problem-solving skills to planning as a first step in overcoming procrastination; for each plan, break it down into manageable time-limited steps to reduce the influence of distractibility. Assign homework asking the patient to accomplish identified tasks without procrastination using the techniques learned in therapy); and review and provide corrective feedback toward improving the skill and decreasing procrastination.      Objective C  Patient will learn and implement organization and planning skills.  Status: New - date: 4/15/24. Continued - date(s): 7/23/24, 10/23/24.  Intervention(s): Therapist will teach organization and planning skills including the routine use of a calendar and daily task list. Develop with the patient a procedure for classifying and managing mail and other papers. Teach problem-solving skills (i.e., identify problem, brainstorm all possible options, evaluate the pros and cons of each option, select best option, implement a course of action, and evaluate results) as an approach to planning; for each plan, break it down into manageable time-limited steps to reduce the influence of distractibility.    Patient has reviewed and agreed to the above plan.     Loree Mendenhall, [facility]  October 23, 2024

## 2024-11-14 ENCOUNTER — VIRTUAL VISIT (OUTPATIENT)
Dept: PSYCHOLOGY | Facility: CLINIC | Age: 19
End: 2024-11-14
Payer: COMMERCIAL

## 2024-11-14 DIAGNOSIS — F43.23 ADJUSTMENT DISORDER WITH MIXED ANXIETY AND DEPRESSED MOOD: Primary | ICD-10-CM

## 2024-11-14 PROCEDURE — 90837 PSYTX W PT 60 MINUTES: CPT | Mod: 93 | Performed by: SOCIAL WORKER

## 2024-11-14 NOTE — PROGRESS NOTES
"  [facility]                                   Progress Note    Patient Name: Berta Oseguera  Date: 11/14/2024                       Service Type: Individual  Attendee(s): Patient attended alone      Session Start Time: 1220  Session End Time: 1317     Session Length: 53+ min       Session #: 28    Service Modality: Phone Visit:      Provider verified identity through the following two step process.  Patient provided:  Patient is known previously to provider and Patient was verified at admission/transfer  Telephone Visit: The patient's condition can be safely assessed and treated via synchronous audio telemedicine encounter.    Reason for Audio Telemedicine Visit: Patient has requested telehealth visit and Patient convenience (e.g. access to timely appointments / distance to available provider)  Originating Site (Patient Location): Patient's home  Distant Site (Provider Location): Provider Remote Setting- Home Office  Consent:  The patient/guardian has verbally consented to:   1. The potential risks and benefits of telemedicine (telephone visit) versus in person care;  The patient has been notified of the following:    \"We have found that certain health care needs can be provided without the need for a face to face visit.  This service lets us provide the care you need with a phone conversation.    I will have full access to your [facility] medical record during this entire phone call. I will be taking notes for your medical record.   Since this is like an office visit, we will bill your insurance company for this service.    There are potential benefits and risks of telephone visits (e.g. limits to patient confidentiality) that differ from in-person visits.? Confidentiality still applies for telephone services, and nobody will record the visit.  It is important to be in a quiet, private space that is free of distractions (including cell phone or other devices) during the visit.??    If " "during the course of the call I believe a telephone visit is not appropriate, you will not be charged for this service\"  Consent has been obtained for this service by care team member: Yes     DATA  Extended Session (53+ minutes): PROLONGED SERVICE IN THE OUTPATIENT SETTING REQUIRING DIRECT (FACE-TO-FACE) PATIENT CONTACT BEYOND THE USUAL SERVICE:    - Patient's presenting concerns require more intensive intervention than could be completed within the usual service  Interactive Complexity: No  Crisis: No      Progress Since Last Session (related to symptoms/goals/homework):   Symptoms: No change - stable, \"not the worst, but not the best\"    Homework: Achieved / completed to satisfaction     Episode of Care Goals: Satisfactory progress - ACTION (Actively working towards change); Intervened by reinforcing change plan / affirming steps taken    Current/Ongoing Stressors and Concerns: independent, social, strong family support, financial stress, trauma hx, recently quit marijuana (9/2023)     Treatment Objective(s) Addressed in This Session:   Verbalize an accurate understanding of depression.  Verbalize an understanding of the rationale for treatment of depression.  Identify and replace thoughts and beliefs that support depression.  Increasingly verbalize hopeful and positive statements regarding self, others, and the future.  Verbalize an understanding of healthy and unhealthy emotions with the intent of increasing the use of healthy emotions to guide actions.  Verbalize an understanding of the cognitive, physiological, and behavioral components of anxiety and its treatment.  Identify, challenge, and replace biased, fearful self-talk with positive, realistic, and empowering self-talk.  Increase insight into the historical and current sources of low self-esteem.  Decrease the verbalized fear of rejection while increasing statements of self-acceptance.  Identify positive traits and talents about self.  Identify and " replace negative self-talk messages used to reinforce low self-confidence.  Identify and engage in activities that would improve self-image by being consistent with one s values.  Decrease the frequency of negative self-descriptive statements and increase frequency of positive self-descriptive statements.  Demonstrate an increased ability to identify and express personal feelings.  Articulate a plan to be proactive in trying to get identified needs met.  Positively acknowledge verbal compliments from others.  Take verbal responsibility for accomplishments without discounting.  Learn and implement:  behavioral strategies to overcome depression  problem-solving and decision-making skills  calming skills to reduce overall anxiety and manage anxiety  problem-solving strategies for realistically addressing worries    Intervention: Contacted from waitlist to offer appointment. Presents with euthymic mood, congruent affect. Endorses stable symptoms. Verbalizes frustration with family members. Assisted in exploring the conflicted relationship, the nature of the dispute, level of impasse, and available options. Discussed assertive communication. Taught to use behaviors that protect their rights while respecting the rights of others. Encouraged development and maintenance of healthy boundaries. Provided empathy, validation, and unconditional positive regard. Patient was openly engaged. She responded well to the interventions and was able to use the session to make sense of her feelings and experiences.      Assessments completed prior to visit:      12/20/2023     3:14 PM 1/24/2024    12:03 PM 3/18/2024     1:11 PM 4/15/2024     4:17 PM 7/23/2024     9:03 AM 9/26/2024     1:23 PM 11/7/2024     4:07 PM   PHQ-9 SCORE   PHQ-9 Total Score [facility] 3 (Minimal depression) 21 (Severe depression) 15 (Moderately severe depression) 9 (Mild depression) 7 (Mild depression) 19 (Moderately severe depression) 9 (Mild depression)   PHQ-9  Total Score 3 21 15 9 7 19    19 9        Patient-reported    Multiple values from one day are sorted in reverse-chronological order         12/20/2023     3:16 PM 1/24/2024    12:02 PM 3/18/2024     1:12 PM 4/15/2024     4:19 PM 7/23/2024     9:05 AM 9/26/2024     1:22 PM 11/7/2024     4:09 PM   YG-7 SCORE   Total Score 7 (mild anxiety) 15 (severe anxiety) 16 (severe anxiety) 9 (mild anxiety) 11 (moderate anxiety) 10 (moderate anxiety) 12 (moderate anxiety)   Total Score 7 15 16 9 11 10    10 12        Patient-reported    Multiple values from one day are sorted in reverse-chronological order       ASSESSMENT: Current Emotional/Mental Status (status of significant symptoms):   Risk status (Self/Other harm or suicidal ideation)   Patient denies current fears or concerns for personal safety.   Patient denies current or recent suicidal ideation or behaviors.   Patient denies current or recent homicidal ideation or behaviors.   Patient denies current or recent self injurious behavior or ideation.   Patient denies other safety concerns.   Patient reports there has been no change in risk factors since their last session.    Patient reports there has been no change in protective factors since their last session.    Recommended that patient call 911 or go to the local ED should there be a change in any of these risk factors.     Appearance:   Unable to assess    Eye Contact:   Unable to assess    Psychomotor Behavior: Unable to assess    Attitude:   Cooperative  Pleasant   Orientation:   All   Speech    Rate/Production: Normal/ Responsive    Volume:  Normal    Mood:    Normal Euthymic   Affect:    Appropriate    Thought Content:  Clear    Thought Form:  Coherent  Logical    Insight:    Fair      Medication Review: No current psychiatric medications prescribed     Medication Compliance: NA     Changes in Health Issues: None reported     Chemical Use Review:  Substance Use: Problem use continues with no change since last  session, Not addressed in session      Quit marijuana September 2023; hx daily use.  Restarted marijuana late September 2023, using socially/recreationally.  4/15/24: Estimates using marijuana every two days.  7/23/24: Smoking weed daily.    Tobacco Use: No change in amount of tobacco use since last session.  Patient declined discussion at this time    Diagnosis:  1. Adjustment disorder with mixed anxiety and depressed mood      Collateral Reports Completed: Not Applicable    PLAN: focus energy and efforts on caring for you, remember boundary setting is not mean or unsupportive      Next appt(s): 11/26   Waitlist week(s) of: 11/18, noon or 4:30 pm     April JONATAN Bunn  11/14/2024                                                ______________________________________________________________________    Individual Treatment Plan    Patient's Name: Berta Oseguera  YOB: 2005    Date of Creation: 9/19/2023   Date Treatment Plan Last Reviewed/Revised: 10/23/2024; will next review 1/21/25         DSM5 Diagnosis: Adjustment Disorders  309.28 (F43.23) With mixed anxiety and depressed mood  Psychosocial/Contextual Factors: independent, social, strong family support, financial stress, trauma hx, recently quit marijuana (9/2023)  PROMIS (reviewed every 90 days): PROMIS-10 Scores      7/23/2024     9:09 AM 9/26/2024     1:25 PM 11/7/2024     4:12 PM   PROMIS-10 Total Score w/o Sub Scores   PROMIS TOTAL - SUBSCORES 23 14    14 21        Patient-reported    Multiple values from one day are sorted in reverse-chronological order      Referral/Collaboration: Referral to another professional/service is not indicated at this time.    Anticipated number of session for this episode of care: 9-12 sessions  Anticipation frequency of session: Biweekly  Anticipated Duration of each session: 38-52 minutes  Treatment plan will be reviewed in 90 days or when goals have been changed.     Measurable Treatment  "Goal(s) related to diagnosis/functional impairment(s)    \"To gain self-love and self-confidence again, talk about a lot of stuff that's been bothering me, to build a bonding relationship with you [therapist].\"     Goal 1: Patient will experience decrease in depressive symptoms as evidenced by PHQ-9 scores.    I will know I've met my goal when I have better conversations about my life and what I'm doing, and am less sad and irritable.   12/20/23: \"Marci the same. I'm less irritable, but still .... I\"m just always worried.\"   4/15/24: \"I feel like I came out of a really dark spot, but I still feel like that spot could be easily fallen back into. I want to work on not falling into it so easily or quickly.\"     Objective A    Patient will verbalize an accurate understanding of depression.  Status: New - date: 9/19/23. Continued - date(s): 12/20/23, 4/15/24, 7/23/24, 10/23/24.  Intervention(s): Therapist will, consistent with the treatment model, discuss how cognitive, behavioral, interpersonal, and/or other factors (e.g. family history) contribute to depression.     Objective B  Patient will verbalize an understanding of the rationale for treatment of depression.  Status: New - date: 9/19/23. Continued - date(s): 12/20/23, 4/15/24, 7/23/24, 10/23/24.  Intervention(s): Therapist will, consistent with the treatment model, discuss how change in cognitive, behavioral, interpersonal, and/or other factors can help alleviate depression and return to previous level of effective functioning.     Objective C    Patient will identify and replace thoughts and beliefs that support depression.  Status: New - date: 9/19/23. Continued - date(s): 12/20/23, 4/15/24, 7/23/24, 10/23/24.  Intervention(s): Therapist will conduct cognitive-behavioral therapy, first conveying the connection among cognition, depressive feelings, and actions. Assign the patient to self-monitor thoughts, feelings, and actions in a journal; process the journal " material to identify, challenge, and change depressive thinking patterns and replace them with reality-based thoughts. Identify, challenge, and change depressive thinking patterns and replace them with reality-based thoughts. Assign  behavioral experiments  in and outside of sessions that test depressive automatic thoughts and beliefs against more reality-based ones toward a sustained shift reflecting hopefulness, motivation, confidence, and an improved self-concept. Facilitate and reinforce the patient's shift from biased depressive self-talk and beliefs to reality-based alternatives that enhance emotion regulation and increase adaptive functioning. Explore and restructure underlying assumptions and schema reflected in biased self-talk that may place the patient at risk for relapse or recurrence; help build the patient's self-concept from unlovable, worthless, helpless, or incompetent to empowering alternatives.    Objective D  Patient will learn and implement behavioral strategies to overcome depression.  Status: New - date: 9/19/23. Continued - date(s): 12/20/23, 4/15/24, 7/23/24, 10/23/24.  Intervention(s): Therapist will engage the patient in  behavioral activation,  increasing his/her/their activity level and contact with sources of reward, while identifying processes that inhibit or obstruct activation; use instruction, rehearsal, role-playing, role reversal, as needed, to facilitate activity in the patient's daily life; reinforce success, problem-solve obstacles. Assist the patient in developing skills that increase the likelihood of deriving pleasure and meaning from behavioral activation (e.g., assertiveness skills, developing an exercise plan, less internal/more external focus, increased social involvement); reinforce success; problem-solve obstacles toward sustained, rewarding activation.    Objective E  Patient will increasingly verbalize hopeful and positive statements regarding self, others, and the  future.  Status: New - date: 9/19/23. Continued - date(s): 12/20/23, 4/15/24, 7/23/24, 10/23/24.  Intervention(s): Therapist will teach more about depression and how to recognize and accept some sadness as a normal variation in feeling. Assign the patient to write positive affirmation statements regarding themselves and the future.      Objective F  Patient will learn and implement problem-solving and decision-making skills.                     Status: New - date: 9/19/23. Continued - date(s): 12/20/23, 4/15/24, 7/23/24, 10/23/24.  Intervention(s): Therapist will conduct problem-solving therapy using techniques such as psychoeducation, modeling, and role-playing to teach patient problem-solving skills (i.e., defining a problem specifically, generating possible solutions, evaluating the pros and cons of each solution, selecting and implementing a plan of action, evaluating the efficacy of the plan, accepting or revising the plan); accepting or revising the plan); role-play application of the problem-solving skill to a real life issue. Encourage the development of a positive problem orientation in which problems and solving them are viewed as a natural part of life and not something to be feared, despaired, or avoided; reinforce successes toward sustained, effective use.    Objective G    Patient will verbalize an understanding of healthy and unhealthy emotions with the intent of increasing the use of healthy emotions to guide actions.  Status: New - date: 9/19/23. Continued - date(s): 12/20/23, 4/15/24, 7/23/24, 10/23/24.  Intervention(s): Therapist will use a process-experiential approach consistent with emotion-focused therapy to create a safe, nurturing environment in which the patient can process emotions, learning to identify and regulate unhealthy feelings and to generate more adaptive ones that then guide actions.     Goal 2: Patient will experience decrease in anxiety symptoms as evidenced by YG-7  "scores.   I will know I've met my goal when I am spending more time doing things just for myself, like being outside or going for walks, and doing things, not just sitting with friends looking at social media.    12/20/23: \"This has been better. I hang out with my siblings and family a lot more.\" Worrying more, always worried about the next bill, how I'm gonna pay it, how I'm gonna pay for food and gas.\"   4/15/24: \"It's not totally under control, sometimes it gets uncomfortable.\"    Objective A    Patient will verbalize an understanding of the cognitive, physiological, and behavioral components of anxiety and its treatment.  Status: New - date: 9/19/23. Continued - date(s): 12/20/23, 4/15/24, 7/23/24, 10/23/24.  Intervention(s): Therapist will discuss how anxiety typically involves excessive worry about unrealistically appraised threats, various bodily expressions of overarousal, hypervigilance, and avoidance of what is threatening that interact to maintain the problem. Discuss how treatment targets worry, anxiety symptoms, and avoidance to help the patient manage worry effectively, reduce overarousal, eliminate unnecessary avoidance, and reengage in rewarding activities.    Objective B  Patient will verbalize an understanding of the role that thinking plays in worry, anxiety, and avoidance.  Status: New - date: 9/19/23. Continued - date(s): 12/20/23, 4/15/24, 7/23/24, 10/23/24.  Intervention(s): Therapist will discuss examples demonstrating how unproductive worry typically overestimates the probability of threats and underestimates or overlooks the patient's ability to manage realistic demands. Assist the patient in analyzing worries by examining potential biases such as the probability of the negative expectation occurring, the real consequences of it occurring, ability to control the outcome, the worst possible outcome, and ability to accept it. Help the patient gain insight into the notion that worry creates " acute and/or chronic anxious apprehension, leading to avoidance that precludes finding solutions to problems.    Objective C  Patient will identify, challenge, and replace biased, fearful self-talk with positive, realistic, and empowering self-talk.  Status: New - date: 9/19/23. Continued - date(s): 12/20/23, 4/15/24, 7/23/24, 10/23/24.  Intervention(s): Therapist will utilize techniques from cognitive behavioral therapies including intolerance of uncertainty and metacognitive therapies explore the patient's self-talk, underlying assumptions, schema, or metacognition that mediate anxiety; assist the patient in challenging and changing biases; conduct behavioral experiments to test biased versus unbiased predictions toward dispelling unproductive worry and increasing self-confidence in addressing the subject of worry. Assign homework exercises to identify fearful self-talk, identify biases in the self-talk, generate alternatives, and test them through behavioral experiments; review and reinforce success, providing corrective feedback toward improvement.    Objective D  Patient will learn and implement calming skills to reduce overall anxiety and manage anxiety.  Status: New - date: 9/19/23. Continued - date(s): 12/20/23, 4/15/24, 7/23/24, 10/23/24.  Intervention(s): Therapist will teach calming/relaxation/mindfulness skills (e.g., applied relaxation, progressive muscle relaxation, cue controlled relaxation; mindful breathing; biofeedback) and how to discriminate better between relaxation and tension; teach the patient how to apply these skills to daily life. Assign homework in which he/she/they practice calming/relaxation/mindfulness skills, gradually applying them progressively from non-anxiety-provoking to anxiety-provoking situations; review and reinforce success; resolve obstacles toward sustained implementation.    Objective E  Patient will learn and implement problem-solving strategies for realistically  "addressing worries.  Status: New - date: 9/19/23. Continued - date(s): 12/20/23, 4/15/24, 7/23/24, 10/23/24.  Intervention(s): Therapist will teach problem-solving/solution-finding strategies to replace unproductive worry involving specifically defining a problem, generating options for addressing it, evaluating the pros and cons of each option, selecting and implementing an action plan, and reevaluating and refining the action.    Goal 3: Patient will establish an inward sense of self-worth, confidence, and competence.    I will know I've met my goal when I am wearing something other than sweats outside of work.    12/20/23: No change. Wears sweats a lot in winter because of cold. Will take more pride in appearance. Would like to pick at pimples less.  4/15/24: \"I feel like my self-confidence is better, but still not to where I want it to be.\"      Objective A  Patient will increase insight into the historical and current sources of low self-esteem.  Status: New - date: 9/19/23. Continued - date(s): 12/20/23, 4/15/24, 7/23/24, 10/23/24.  Intervention(s): Therapist will help patient become aware of fear of rejection and its connection with past rejection or abandonment experiences; begin to contrast past experiences of pain with present experiences of acceptance and competence. Discuss, emphasize, and interpret the patient's incidents of abuse and how they have affected feelings about self.    Objective B  Patient will decrease the verbalized fear of rejection while increasing statements of self-acceptance.  Status: New - date: 9/19/23. Continued - date(s): 12/20/23, 4/15/24, 7/23/24, 10/23/24.  Intervention(s): Therapist will assign the patient to write positive affirmation statements regarding themselves and the future. Verbally reinforce the patient s use of positive statements of confidence and accomplishments.    Objective C  Patient will identify positive traits and talents about self.  Status: New - date: " 9/19/23. Continued - date(s): 12/20/23, 4/15/24, 7/23/24, 10/23/24.  Intervention(s): Therapist will assign patient the exercise of identifying his/her/their positive physical characteristics in a mirror to help him/her/them become more comfortable with himself/herself/themselves. Ask the patient to keep building a list of positive traits and have him/her/them read the list at the beginning and end of each session     Objective D  Patient will identify and replace negative self-talk messages used to reinforce low self-confidence.  Status: New - date: 9/19/23. Continued - date(s): 12/20/23, 4/15/24, 7/23/24, 10/23/24.  Intervention(s): Therapist will help the patient identify distorted, negative beliefs about self and the world and replace these messages with more realistic, affirmative messages. Ask the patient to complete and process self-esteem-building exercises from recommended self-help books (e.g., Ten Days to Self Esteem by Maeve; The Self-Esteem  by Haroon Haines [place], and Ayad; 10 Simple Solutions for Building Self-Esteem by Mary Jane).     Objective E  Patient will identify and engage in activities that would improve self-image by being consistent with one s values.  Status: New - date: 9/19/23. Continued - date(s): 12/20/23, 4/15/24, 7/23/24, 10/23/24.  Intervention(s): Therapist will help patient analyze his/her/their values and the congruence or incongruence between them and the patient s daily activities. Identify and assign activities congruent with the patient s values; process them toward improving self-concept and self-esteem.    Objective F  Patient will decrease the frequency of negative self-descriptive statements and increase frequency of positive self-descriptive statements.  Status: New - date: 9/19/23. Continued - date(s): 12/20/23, 4/15/24, 7/23/24, 10/23/24.  Intervention(s): Therapist will assist the patient in becoming aware of how he/she/they express or act out  negative self-feelings. Help patient reframe his/her/their negative self-assessment. Assist in developing positive self-talk as a way of boosting confidence and self-image.    Objective G    Patient will demonstrate an increased ability to identify and express personal feelings.  Status: New - date: 9/19/23. Continued - date(s): 12/20/23, 4/15/24, 7/23/24, 10/23/24.  Intervention(s): Therapist will assist patient in identifying and labeling emotions.    Objective H  Patient will articulate a plan to be proactive in trying to get identified needs met.  Status: New - date: 9/19/23. Continued - date(s): 12/20/23, 4/15/24, 7/23/24, 10/23/24.  Intervention(s): Therapist will assist the patient in identifying and verbalizing needs, met and unmet. Assist the patient in developing a specific action plan to get each need met.    Objective I    Patient will positively acknowledge verbal compliments from others.  Status: New - date: 9/19/23. Continued - date(s): 12/20/23, 4/15/24, 7/23/24, 10/23/24.  Intervention(s): Therapist will assign patient to be aware of and acknowledge graciously (without discounting) praise and compliments from others.    Objective J  Patient will take verbal responsibility for accomplishments without discounting.  Status: New - date: 9/19/23. Continued - date(s): 12/20/23, 4/15/24, 7/23/24, 10/23/24.  Intervention(s): Therapist will ask patient list accomplishments; process the integration of these into his/her/their self-image.    Goal 4: Patient will achieve a satisfactory level of balance, structure, and intimacy in personal life.    I will know I've met my goal when I am budgeting, going grocery shopping, have more of a routine, and focus more on my physical health (eat more regularly).       Objective A                  Patient will acknowledge procrastination and the need to reduce it.  Status: New - date: 4/15/24. Continued - date(s): 7/23/24, 10/23/24.  Intervention(s): Therapist will assist  in identifying positives and negatives of procrastinating toward the goal of engaging the patient in staying focused.      Objective B  Patient will learn and implement skills to reduce procrastination.  Status: New - date: 4/15/24. Continued - date(s): 7/23/24, 10/23/24.  Intervention(s): Therapist will teach to apply new problem-solving skills to planning as a first step in overcoming procrastination; for each plan, break it down into manageable time-limited steps to reduce the influence of distractibility. Assign homework asking the patient to accomplish identified tasks without procrastination using the techniques learned in therapy); and review and provide corrective feedback toward improving the skill and decreasing procrastination.      Objective C  Patient will learn and implement organization and planning skills.  Status: New - date: 4/15/24. Continued - date(s): 7/23/24, 10/23/24.  Intervention(s): Therapist will teach organization and planning skills including the routine use of a calendar and daily task list. Develop with the patient a procedure for classifying and managing mail and other papers. Teach problem-solving skills (i.e., identify problem, brainstorm all possible options, evaluate the pros and cons of each option, select best option, implement a course of action, and evaluate results) as an approach to planning; for each plan, break it down into manageable time-limited steps to reduce the influence of distractibility.    Patient has reviewed and agreed to the above plan.     Loree Mendenhall, [facility]othe[facility] Psychiatric CenterSW  October 23, 2024

## 2024-11-25 ENCOUNTER — VIRTUAL VISIT (OUTPATIENT)
Dept: PSYCHOLOGY | Facility: CLINIC | Age: 19
End: 2024-11-25
Payer: COMMERCIAL

## 2024-11-25 DIAGNOSIS — F43.23 ADJUSTMENT DISORDER WITH MIXED ANXIETY AND DEPRESSED MOOD: Primary | ICD-10-CM

## 2024-11-25 PROCEDURE — 90837 PSYTX W PT 60 MINUTES: CPT | Mod: 93 | Performed by: SOCIAL WORKER

## 2024-11-25 NOTE — PROGRESS NOTES
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 and labeling emotions.    Objective H  Patient will articulate a plan to be proactive in trying to get identified needs met.  Status: New - date: 9/19/23. Continued - date(s): 12/20/23, 4/15/24, 7/23/24, 10/23/24.  Intervention(s): Therapist will assist the patient in identifying and verbalizing needs, met and unmet. Assist the patient in developing a specific action plan to get each need met.    Objective I    Patient will positively acknowledge verbal compliments from others.  Status: New - date: 9/19/23. Continued - date(s): 12/20/23, 4/15/24, 7/23/24, 10/23/24.  Intervention(s): Therapist will assign patient to be aware of and acknowledge graciously (without discounting) praise and compliments from others.    Objective J  Patient will take verbal responsibility for accomplishments without discounting.  Status: New - date: 9/19/23. Continued - date(s): 12/20/23, 4/15/24, 7/23/24, 10/23/24.  Intervention(s): Therapist will ask patient list accomplishments; process the integration of these into his/her/their self-image.    Goal 4: Patient will achieve a satisfactory level of balance, structure, and intimacy in personal life.    I will know I've met my goal when I am budgeting, going grocery shopping, have more of a routine, and focus more on my physical health (eat more  regularly).       Objective A                  Patient will acknowledge procrastination and the need to reduce it.  Status: New - date: 4/15/24. Continued - date(s): 7/23/24, 10/23/24.  Intervention(s): Therapist will assist in identifying positives and negatives of procrastinating toward the goal of engaging the patient in staying focused.      Objective B  Patient will learn and implement skills to reduce procrastination.  Status: New - date: 4/15/24. Continued - date(s): 7/23/24, 10/23/24.  Intervention(s): Therapist will teach to apply new problem-solving skills to planning as a first step in overcoming procrastination; for each plan, break it down into manageable time-limited steps to reduce the influence of distractibility. Assign homework asking the patient to accomplish identified tasks without procrastination using the techniques learned in therapy); and review and provide corrective feedback toward improving the skill and decreasing procrastination.      Objective C  Patient will learn and implement organization and planning skills.  Status: New - date: 4/15/24. Continued - date(s): 7/23/24, 10/23/24.  Intervention(s): Therapist will teach organization and planning skills including the routine use of a calendar and daily task list. Develop with the patient a procedure for classifying and managing mail and other papers. Teach problem-solving skills (i.e., identify problem, brainstorm all possible options, evaluate the pros and cons of each option, select best option, implement a course of action, and evaluate results) as an approach to planning; for each plan, break it down into manageable time-limited steps to reduce the influence of distractibility.    Patient has reviewed and agreed to the above plan.     Loree Mendenhall, LICSW  October 23, 2024

## 2024-11-26 ENCOUNTER — VIRTUAL VISIT (OUTPATIENT)
Dept: PSYCHOLOGY | Facility: CLINIC | Age: 19
End: 2024-11-26
Payer: COMMERCIAL

## 2024-11-26 DIAGNOSIS — F43.23 ADJUSTMENT DISORDER WITH MIXED ANXIETY AND DEPRESSED MOOD: Primary | ICD-10-CM

## 2024-11-26 PROCEDURE — 90837 PSYTX W PT 60 MINUTES: CPT | Mod: 93 | Performed by: SOCIAL WORKER

## 2024-11-26 ASSESSMENT — ANXIETY QUESTIONNAIRES
7. FEELING AFRAID AS IF SOMETHING AWFUL MIGHT HAPPEN: SEVERAL DAYS
IF YOU CHECKED OFF ANY PROBLEMS ON THIS QUESTIONNAIRE, HOW DIFFICULT HAVE THESE PROBLEMS MADE IT FOR YOU TO DO YOUR WORK, TAKE CARE OF THINGS AT HOME, OR GET ALONG WITH OTHER PEOPLE: SOMEWHAT DIFFICULT
5. BEING SO RESTLESS THAT IT IS HARD TO SIT STILL: SEVERAL DAYS
8. IF YOU CHECKED OFF ANY PROBLEMS, HOW DIFFICULT HAVE THESE MADE IT FOR YOU TO DO YOUR WORK, TAKE CARE OF THINGS AT HOME, OR GET ALONG WITH OTHER PEOPLE?: SOMEWHAT DIFFICULT
3. WORRYING TOO MUCH ABOUT DIFFERENT THINGS: SEVERAL DAYS
7. FEELING AFRAID AS IF SOMETHING AWFUL MIGHT HAPPEN: SEVERAL DAYS
1. FEELING NERVOUS, ANXIOUS, OR ON EDGE: MORE THAN HALF THE DAYS
6. BECOMING EASILY ANNOYED OR IRRITABLE: SEVERAL DAYS
GAD7 TOTAL SCORE: 7
GAD7 TOTAL SCORE: 7
4. TROUBLE RELAXING: NOT AT ALL
2. NOT BEING ABLE TO STOP OR CONTROL WORRYING: SEVERAL DAYS
GAD7 TOTAL SCORE: 7

## 2024-11-26 ASSESSMENT — PATIENT HEALTH QUESTIONNAIRE - PHQ9
SUM OF ALL RESPONSES TO PHQ QUESTIONS 1-9: 9
10. IF YOU CHECKED OFF ANY PROBLEMS, HOW DIFFICULT HAVE THESE PROBLEMS MADE IT FOR YOU TO DO YOUR WORK, TAKE CARE OF THINGS AT HOME, OR GET ALONG WITH OTHER PEOPLE: SOMEWHAT DIFFICULT
SUM OF ALL RESPONSES TO PHQ QUESTIONS 1-9: 9

## 2024-11-26 NOTE — PROGRESS NOTES
"  [facility]                                   Progress Note    Patient Name: Berta Oseguera  Date: 11/26/2024                       Service Type: Individual  Attendee(s): Patient attended alone      Session Start Time: 1109  Session End Time: 1209     Session Length: 53+ min       Session #: 30    Service Modality: Phone Visit:      Provider verified identity through the following two step process.  Patient provided:  Patient is known previously to provider and Patient was verified at admission/transfer  Telephone Visit: The patient's condition can be safely assessed and treated via synchronous audio telemedicine encounter.    Reason for Audio Telemedicine Visit: Patient has requested telehealth visit and Patient convenience (e.g. access to timely appointments / distance to available provider)  Originating Site (Patient Location): Patient's home  Distant Site (Provider Location): Provider Remote Setting- Home Office  Consent:  The patient/guardian has verbally consented to:   1. The potential risks and benefits of telemedicine (telephone visit) versus in person care;  The patient has been notified of the following:    \"We have found that certain health care needs can be provided without the need for a face to face visit.  This service lets us provide the care you need with a phone conversation.    I will have full access to your [facility] medical record during this entire phone call. I will be taking notes for your medical record.   Since this is like an office visit, we will bill your insurance company for this service.    There are potential benefits and risks of telephone visits (e.g. limits to patient confidentiality) that differ from in-person visits.? Confidentiality still applies for telephone services, and nobody will record the visit.  It is important to be in a quiet, private space that is free of distractions (including cell phone or other devices) during the visit.??    If " "during the course of the call I believe a telephone visit is not appropriate, you will not be charged for this service\"  Consent has been obtained for this service by care team member: Yes     DATA  Extended Session (53+ minutes): PROLONGED SERVICE IN THE OUTPATIENT SETTING REQUIRING DIRECT (FACE-TO-FACE) PATIENT CONTACT BEYOND THE USUAL SERVICE:    - Patient's presenting concerns require more intensive intervention than could be completed within the usual service  Interactive Complexity: No  Crisis: No      Progress Since Last Session (related to symptoms/goals/homework):   Symptoms: Improving - decreased anxiety sx    Homework: Completed in session     Episode of Care Goals: Satisfactory progress - ACTION (Actively working towards change); Intervened by reinforcing change plan / affirming steps taken    Current/Ongoing Stressors and Concerns: independent, social, strong family support, financial stress, trauma hx, recently quit marijuana (9/2023)     Treatment Objective(s) Addressed in This Session:   Verbalize an accurate understanding of depression.  Verbalize an understanding of the rationale for treatment of depression.  Identify and replace thoughts and beliefs that support depression.  Increasingly verbalize hopeful and positive statements regarding self, others, and the future.  Verbalize an understanding of healthy and unhealthy emotions with the intent of increasing the use of healthy emotions to guide actions.  Verbalize an understanding of the cognitive, physiological, and behavioral components of anxiety and its treatment.  Identify, challenge, and replace biased, fearful self-talk with positive, realistic, and empowering self-talk.  Increase insight into the historical and current sources of low self-esteem.  Decrease the verbalized fear of rejection while increasing statements of self-acceptance.  Identify positive traits and talents about self.  Identify and replace negative self-talk messages used " to reinforce low self-confidence.  Identify and engage in activities that would improve self-image by being consistent with one s values.  Decrease the frequency of negative self-descriptive statements and increase frequency of positive self-descriptive statements.  Demonstrate an increased ability to identify and express personal feelings.  Articulate a plan to be proactive in trying to get identified needs met.  Positively acknowledge verbal compliments from others.  Take verbal responsibility for accomplishments without discounting.  Learn and implement:  behavioral strategies to overcome depression  problem-solving and decision-making skills  calming skills to reduce overall anxiety and manage anxiety  problem-solving strategies for realistically addressing worries    Intervention: Presents with euthymic mood, congruent affect. Endorses improving anxiety symptoms. Provided active listening and support as patient talked about several interpersonal conflicts. Assisted patient in resolving interpersonal conflicts (e.g., using problem-solving, assertiveness training, adaptive communication habits, etc.). Taught to focus on matters that are within one's own control. Explored and identified boundaries to promote healthier interpersonal relationships. Utilized CBT strategies to challenge and reframe distorted thoughts. Provided empathy, validation, and unconditional positive regard. Patient was active and engaged throughout the session and responsive to the interventions offered.        Assessments completed prior to visit:      1/24/2024    12:03 PM 3/18/2024     1:11 PM 4/15/2024     4:17 PM 7/23/2024     9:03 AM 9/26/2024     1:23 PM 11/7/2024     4:07 PM 11/26/2024    11:13 AM   PHQ-9 SCORE   PHQ-9 Total Score [facility] 21 (Severe depression) 15 (Moderately severe depression) 9 (Mild depression) 7 (Mild depression) 19 (Moderately severe depression) 9 (Mild depression) 9 (Mild depression)   PHQ-9 Total Score 21 15 9 7  19    19 9  9        Patient-reported    Multiple values from one day are sorted in reverse-chronological order         1/24/2024    12:02 PM 3/18/2024     1:12 PM 4/15/2024     4:19 PM 7/23/2024     9:05 AM 9/26/2024     1:22 PM 11/7/2024     4:09 PM 11/26/2024    11:14 AM   YG-7 SCORE   Total Score 15 (severe anxiety) 16 (severe anxiety) 9 (mild anxiety) 11 (moderate anxiety) 10 (moderate anxiety) 12 (moderate anxiety) 7 (mild anxiety)   Total Score 15 16 9 11 10    10 12  7        Patient-reported    Multiple values from one day are sorted in reverse-chronological order       ASSESSMENT: Current Emotional/Mental Status (status of significant symptoms):   Risk status (Self/Other harm or suicidal ideation)   Patient denies current fears or concerns for personal safety.   Patient denies current or recent suicidal ideation or behaviors.   Patient denies current or recent homicidal ideation or behaviors.   Patient denies current or recent self injurious behavior or ideation.   Patient denies other safety concerns.   Patient reports there has been no change in risk factors since their last session.    Patient reports there has been no change in protective factors since their last session.    Recommended that patient call 911 or go to the local ED should there be a change in any of these risk factors.     Appearance:   Unable to assess    Eye Contact:   Unable to assess    Psychomotor Behavior: Unable to assess    Attitude:   Cooperative  Pleasant   Orientation:   All   Speech    Rate/Production: Normal/ Responsive    Volume:  Normal    Mood:    Normal Euthymic   Affect:    Appropriate    Thought Content:  Clear    Thought Form:  Coherent  Logical    Insight:    Fair      Medication Review: No current psychiatric medications prescribed     Medication Compliance: NA     Changes in Health Issues: None reported     Chemical Use Review:  Substance Use: Problem use continues with no change since last session, Not addressed  "in session      Quit marijuana September 2023; hx daily use.  Restarted marijuana late September 2023, using socially/recreationally.  4/15/24: Estimates using marijuana every two days.  7/23/24: Smoking weed daily.    Tobacco Use: No change in amount of tobacco use since last session.  Patient declined discussion at this time    Diagnosis:  1. Adjustment disorder with mixed anxiety and depressed mood      Collateral Reports Completed: Not Applicable    PLAN: it's okay to set boundaries     Next appt(s): self-schedule; waitlist in the interim     April ALMAS MendenhallJONATAN  11/26/2024                                                ______________________________________________________________________    Individual Treatment Plan    Patient's Name: Berta Oseguera  YOB: 2005    Date of Creation: 9/19/2023   Date Treatment Plan Last Reviewed/Revised: 10/23/2024; will next review 1/21/25         DSM5 Diagnosis: Adjustment Disorders  309.28 (F43.23) With mixed anxiety and depressed mood  Psychosocial/Contextual Factors: independent, social, strong family support, financial stress, trauma hx, recently quit marijuana (9/2023)  PROMIS (reviewed every 90 days): PROMIS-10 Scores      7/23/2024     9:09 AM 9/26/2024     1:25 PM 11/7/2024     4:12 PM   PROMIS-10 Total Score w/o Sub Scores   PROMIS TOTAL - SUBSCORES 23 14    14 21        Patient-reported    Multiple values from one day are sorted in reverse-chronological order      Referral/Collaboration: Referral to another professional/service is not indicated at this time.    Anticipated number of session for this episode of care: 9-12 sessions  Anticipation frequency of session: Biweekly  Anticipated Duration of each session: 38-52 minutes  Treatment plan will be reviewed in 90 days or when goals have been changed.     Measurable Treatment Goal(s) related to diagnosis/functional impairment(s)    \"To gain self-love and self-confidence again, talk " "about a lot of stuff that's been bothering me, to build a bonding relationship with you [therapist].\"     Goal 1: Patient will experience decrease in depressive symptoms as evidenced by PHQ-9 scores.    I will know I've met my goal when I have better conversations about my life and what I'm doing, and am less sad and irritable.   12/20/23: \"Marci the same. I'm less irritable, but still .... I\"m just always worried.\"   4/15/24: \"I feel like I came out of a really dark spot, but I still feel like that spot could be easily fallen back into. I want to work on not falling into it so easily or quickly.\"     Objective A    Patient will verbalize an accurate understanding of depression.  Status: New - date: 9/19/23. Continued - date(s): 12/20/23, 4/15/24, 7/23/24, 10/23/24.  Intervention(s): Therapist will, consistent with the treatment model, discuss how cognitive, behavioral, interpersonal, and/or other factors (e.g. family history) contribute to depression.     Objective B  Patient will verbalize an understanding of the rationale for treatment of depression.  Status: New - date: 9/19/23. Continued - date(s): 12/20/23, 4/15/24, 7/23/24, 10/23/24.  Intervention(s): Therapist will, consistent with the treatment model, discuss how change in cognitive, behavioral, interpersonal, and/or other factors can help alleviate depression and return to previous level of effective functioning.     Objective C    Patient will identify and replace thoughts and beliefs that support depression.  Status: New - date: 9/19/23. Continued - date(s): 12/20/23, 4/15/24, 7/23/24, 10/23/24.  Intervention(s): Therapist will conduct cognitive-behavioral therapy, first conveying the connection among cognition, depressive feelings, and actions. Assign the patient to self-monitor thoughts, feelings, and actions in a journal; process the journal material to identify, challenge, and change depressive thinking patterns and replace them with reality-based " thoughts. Identify, challenge, and change depressive thinking patterns and replace them with reality-based thoughts. Assign  behavioral experiments  in and outside of sessions that test depressive automatic thoughts and beliefs against more reality-based ones toward a sustained shift reflecting hopefulness, motivation, confidence, and an improved self-concept. Facilitate and reinforce the patient's shift from biased depressive self-talk and beliefs to reality-based alternatives that enhance emotion regulation and increase adaptive functioning. Explore and restructure underlying assumptions and schema reflected in biased self-talk that may place the patient at risk for relapse or recurrence; help build the patient's self-concept from unlovable, worthless, helpless, or incompetent to empowering alternatives.    Objective D  Patient will learn and implement behavioral strategies to overcome depression.  Status: New - date: 9/19/23. Continued - date(s): 12/20/23, 4/15/24, 7/23/24, 10/23/24.  Intervention(s): Therapist will engage the patient in  behavioral activation,  increasing his/her/their activity level and contact with sources of reward, while identifying processes that inhibit or obstruct activation; use instruction, rehearsal, role-playing, role reversal, as needed, to facilitate activity in the patient's daily life; reinforce success, problem-solve obstacles. Assist the patient in developing skills that increase the likelihood of deriving pleasure and meaning from behavioral activation (e.g., assertiveness skills, developing an exercise plan, less internal/more external focus, increased social involvement); reinforce success; problem-solve obstacles toward sustained, rewarding activation.    Objective E  Patient will increasingly verbalize hopeful and positive statements regarding self, others, and the future.  Status: New - date: 9/19/23. Continued - date(s): 12/20/23, 4/15/24, 7/23/24,  10/23/24.  Intervention(s): Therapist will teach more about depression and how to recognize and accept some sadness as a normal variation in feeling. Assign the patient to write positive affirmation statements regarding themselves and the future.      Objective F  Patient will learn and implement problem-solving and decision-making skills.                     Status: New - date: 9/19/23. Continued - date(s): 12/20/23, 4/15/24, 7/23/24, 10/23/24.  Intervention(s): Therapist will conduct problem-solving therapy using techniques such as psychoeducation, modeling, and role-playing to teach patient problem-solving skills (i.e., defining a problem specifically, generating possible solutions, evaluating the pros and cons of each solution, selecting and implementing a plan of action, evaluating the efficacy of the plan, accepting or revising the plan); accepting or revising the plan); role-play application of the problem-solving skill to a real life issue. Encourage the development of a positive problem orientation in which problems and solving them are viewed as a natural part of life and not something to be feared, despaired, or avoided; reinforce successes toward sustained, effective use.    Objective G    Patient will verbalize an understanding of healthy and unhealthy emotions with the intent of increasing the use of healthy emotions to guide actions.  Status: New - date: 9/19/23. Continued - date(s): 12/20/23, 4/15/24, 7/23/24, 10/23/24.  Intervention(s): Therapist will use a process-experiential approach consistent with emotion-focused therapy to create a safe, nurturing environment in which the patient can process emotions, learning to identify and regulate unhealthy feelings and to generate more adaptive ones that then guide actions.     Goal 2: Patient will experience decrease in anxiety symptoms as evidenced by YG-7 scores.   I will know I've met my goal when I am spending more time doing things just for myself,  "like being outside or going for walks, and doing things, not just sitting with friends looking at social media.    12/20/23: \"This has been better. I hang out with my siblings and family a lot more.\" Worrying more, always worried about the next bill, how I'm gonna pay it, how I'm gonna pay for food and gas.\"   4/15/24: \"It's not totally under control, sometimes it gets uncomfortable.\"    Objective A    Patient will verbalize an understanding of the cognitive, physiological, and behavioral components of anxiety and its treatment.  Status: New - date: 9/19/23. Continued - date(s): 12/20/23, 4/15/24, 7/23/24, 10/23/24.  Intervention(s): Therapist will discuss how anxiety typically involves excessive worry about unrealistically appraised threats, various bodily expressions of overarousal, hypervigilance, and avoidance of what is threatening that interact to maintain the problem. Discuss how treatment targets worry, anxiety symptoms, and avoidance to help the patient manage worry effectively, reduce overarousal, eliminate unnecessary avoidance, and reengage in rewarding activities.    Objective B  Patient will verbalize an understanding of the role that thinking plays in worry, anxiety, and avoidance.  Status: New - date: 9/19/23. Continued - date(s): 12/20/23, 4/15/24, 7/23/24, 10/23/24.  Intervention(s): Therapist will discuss examples demonstrating how unproductive worry typically overestimates the probability of threats and underestimates or overlooks the patient's ability to manage realistic demands. Assist the patient in analyzing worries by examining potential biases such as the probability of the negative expectation occurring, the real consequences of it occurring, ability to control the outcome, the worst possible outcome, and ability to accept it. Help the patient gain insight into the notion that worry creates acute and/or chronic anxious apprehension, leading to avoidance that precludes finding solutions to " problems.    Objective C  Patient will identify, challenge, and replace biased, fearful self-talk with positive, realistic, and empowering self-talk.  Status: New - date: 9/19/23. Continued - date(s): 12/20/23, 4/15/24, 7/23/24, 10/23/24.  Intervention(s): Therapist will utilize techniques from cognitive behavioral therapies including intolerance of uncertainty and metacognitive therapies explore the patient's self-talk, underlying assumptions, schema, or metacognition that mediate anxiety; assist the patient in challenging and changing biases; conduct behavioral experiments to test biased versus unbiased predictions toward dispelling unproductive worry and increasing self-confidence in addressing the subject of worry. Assign homework exercises to identify fearful self-talk, identify biases in the self-talk, generate alternatives, and test them through behavioral experiments; review and reinforce success, providing corrective feedback toward improvement.    Objective D  Patient will learn and implement calming skills to reduce overall anxiety and manage anxiety.  Status: New - date: 9/19/23. Continued - date(s): 12/20/23, 4/15/24, 7/23/24, 10/23/24.  Intervention(s): Therapist will teach calming/relaxation/mindfulness skills (e.g., applied relaxation, progressive muscle relaxation, cue controlled relaxation; mindful breathing; biofeedback) and how to discriminate better between relaxation and tension; teach the patient how to apply these skills to daily life. Assign homework in which he/she/they practice calming/relaxation/mindfulness skills, gradually applying them progressively from non-anxiety-provoking to anxiety-provoking situations; review and reinforce success; resolve obstacles toward sustained implementation.    Objective E  Patient will learn and implement problem-solving strategies for realistically addressing worries.  Status: New - date: 9/19/23. Continued - date(s): 12/20/23, 4/15/24, 7/23/24,  "10/23/24.  Intervention(s): Therapist will teach problem-solving/solution-finding strategies to replace unproductive worry involving specifically defining a problem, generating options for addressing it, evaluating the pros and cons of each option, selecting and implementing an action plan, and reevaluating and refining the action.    Goal 3: Patient will establish an inward sense of self-worth, confidence, and competence.    I will know I've met my goal when I am wearing something other than sweats outside of work.    12/20/23: No change. Wears sweats a lot in winter because of cold. Will take more pride in appearance. Would like to pick at pimples less.  4/15/24: \"I feel like my self-confidence is better, but still not to where I want it to be.\"      Objective A  Patient will increase insight into the historical and current sources of low self-esteem.  Status: New - date: 9/19/23. Continued - date(s): 12/20/23, 4/15/24, 7/23/24, 10/23/24.  Intervention(s): Therapist will help patient become aware of fear of rejection and its connection with past rejection or abandonment experiences; begin to contrast past experiences of pain with present experiences of acceptance and competence. Discuss, emphasize, and interpret the patient's incidents of abuse and how they have affected feelings about self.    Objective B  Patient will decrease the verbalized fear of rejection while increasing statements of self-acceptance.  Status: New - date: 9/19/23. Continued - date(s): 12/20/23, 4/15/24, 7/23/24, 10/23/24.  Intervention(s): Therapist will assign the patient to write positive affirmation statements regarding themselves and the future. Verbally reinforce the patient s use of positive statements of confidence and accomplishments.    Objective C  Patient will identify positive traits and talents about self.  Status: New - date: 9/19/23. Continued - date(s): 12/20/23, 4/15/24, 7/23/24, 10/23/24.  Intervention(s): Therapist will " assign patient the exercise of identifying his/her/their positive physical characteristics in a mirror to help him/her/them become more comfortable with himself/herself/themselves. Ask the patient to keep building a list of positive traits and have him/her/them read the list at the beginning and end of each session     Objective D  Patient will identify and replace negative self-talk messages used to reinforce low self-confidence.  Status: New - date: 9/19/23. Continued - date(s): 12/20/23, 4/15/24, 7/23/24, 10/23/24.  Intervention(s): Therapist will help the patient identify distorted, negative beliefs about self and the world and replace these messages with more realistic, affirmative messages. Ask the patient to complete and process self-esteem-building exercises from recommended self-help books (e.g., Ten Days to Self Esteem by Maeve; The Self-Esteem  by Haroon Haines [place], and Ayad; 10 Simple Solutions for Building Self-Esteem by Mary Jane).     Objective E  Patient will identify and engage in activities that would improve self-image by being consistent with one s values.  Status: New - date: 9/19/23. Continued - date(s): 12/20/23, 4/15/24, 7/23/24, 10/23/24.  Intervention(s): Therapist will help patient analyze his/her/their values and the congruence or incongruence between them and the patient s daily activities. Identify and assign activities congruent with the patient s values; process them toward improving self-concept and self-esteem.    Objective F  Patient will decrease the frequency of negative self-descriptive statements and increase frequency of positive self-descriptive statements.  Status: New - date: 9/19/23. Continued - date(s): 12/20/23, 4/15/24, 7/23/24, 10/23/24.  Intervention(s): Therapist will assist the patient in becoming aware of how he/she/they express or act out negative self-feelings. Help patient reframe his/her/their negative self-assessment. Assist in developing  positive self-talk as a way of boosting confidence and self-image.    Objective G    Patient will demonstrate an increased ability to identify and express personal feelings.  Status: New - date: 9/19/23. Continued - date(s): 12/20/23, 4/15/24, 7/23/24, 10/23/24.  Intervention(s): Therapist will assist patient in identifying and labeling emotions.    Objective H  Patient will articulate a plan to be proactive in trying to get identified needs met.  Status: New - date: 9/19/23. Continued - date(s): 12/20/23, 4/15/24, 7/23/24, 10/23/24.  Intervention(s): Therapist will assist the patient in identifying and verbalizing needs, met and unmet. Assist the patient in developing a specific action plan to get each need met.    Objective I    Patient will positively acknowledge verbal compliments from others.  Status: New - date: 9/19/23. Continued - date(s): 12/20/23, 4/15/24, 7/23/24, 10/23/24.  Intervention(s): Therapist will assign patient to be aware of and acknowledge graciously (without discounting) praise and compliments from others.    Objective J  Patient will take verbal responsibility for accomplishments without discounting.  Status: New - date: 9/19/23. Continued - date(s): 12/20/23, 4/15/24, 7/23/24, 10/23/24.  Intervention(s): Therapist will ask patient list accomplishments; process the integration of these into his/her/their self-image.    Goal 4: Patient will achieve a satisfactory level of balance, structure, and intimacy in personal life.    I will know I've met my goal when I am budgeting, going grocery shopping, have more of a routine, and focus more on my physical health (eat more regularly).       Objective A                  Patient will acknowledge procrastination and the need to reduce it.  Status: New - date: 4/15/24. Continued - date(s): 7/23/24, 10/23/24.  Intervention(s): Therapist will assist in identifying positives and negatives of procrastinating toward the goal of engaging the patient in  staying focused.      Objective B  Patient will learn and implement skills to reduce procrastination.  Status: New - date: 4/15/24. Continued - date(s): 7/23/24, 10/23/24.  Intervention(s): Therapist will teach to apply new problem-solving skills to planning as a first step in overcoming procrastination; for each plan, break it down into manageable time-limited steps to reduce the influence of distractibility. Assign homework asking the patient to accomplish identified tasks without procrastination using the techniques learned in therapy); and review and provide corrective feedback toward improving the skill and decreasing procrastination.      Objective C  Patient will learn and implement organization and planning skills.  Status: New - date: 4/15/24. Continued - date(s): 7/23/24, 10/23/24.  Intervention(s): Therapist will teach organization and planning skills including the routine use of a calendar and daily task list. Develop with the patient a procedure for classifying and managing mail and other papers. Teach problem-solving skills (i.e., identify problem, brainstorm all possible options, evaluate the pros and cons of each option, select best option, implement a course of action, and evaluate results) as an approach to planning; for each plan, break it down into manageable time-limited steps to reduce the influence of distractibility.    Patient has reviewed and agreed to the above plan.     Loree Mendenhall, ALANASW  October 23, 2024

## 2024-12-16 ENCOUNTER — TELEPHONE (OUTPATIENT)
Dept: PSYCHOLOGY | Facility: CLINIC | Age: 19
End: 2024-12-16
Payer: COMMERCIAL

## 2024-12-19 ENCOUNTER — TELEPHONE (OUTPATIENT)
Dept: PSYCHOLOGY | Facility: CLINIC | Age: 19
End: 2024-12-19
Payer: COMMERCIAL

## 2024-12-19 NOTE — TELEPHONE ENCOUNTER
Contacted from waitlist to offer appointment.     JONATAN Knight on 12/19/2024 at 3:28 PM   
Headache    A headache is pain or discomfort felt around the head or neck area. The specific cause of a headache may not be found as there are many types including tension headaches, migraine headaches, and cluster headaches. Watch your condition for any changes. Things you can do to manage your pain include taking over the counter and prescription medications as instructed by your health care provider, lying down in a dark quiet room, limiting stress, getting regular sleep, and refraining from alcohol and tobacco products.    SEEK IMMEDIATE MEDICAL CARE IF YOU EXPERIENCE THE FOLLOWING SYMPTOMS: fever, vomiting, stiff neck, loss of vision, problems with speech, muscle weakness, loss of balance, trouble walking, pass out, or confusion.

## 2024-12-31 ENCOUNTER — TELEPHONE (OUTPATIENT)
Dept: OBGYN | Facility: CLINIC | Age: 19
End: 2024-12-31
Payer: COMMERCIAL

## 2024-12-31 NOTE — TELEPHONE ENCOUNTER
Attempted to call patient to confirm location of 1/8 appointment, as patient has been previously seen at 7th floor [facility]. Patient answered phone, then immediately ended call.

## 2025-01-08 ENCOUNTER — OFFICE VISIT (OUTPATIENT)
Dept: OBGYN | Facility: CLINIC | Age: 20
End: 2025-01-08
Attending: ADVANCED PRACTICE MIDWIFE
Payer: COMMERCIAL

## 2025-01-08 VITALS
HEART RATE: 76 BPM | SYSTOLIC BLOOD PRESSURE: 123 MMHG | HEIGHT: 69 IN | DIASTOLIC BLOOD PRESSURE: 72 MMHG | BODY MASS INDEX: 27.91 KG/M2

## 2025-01-08 DIAGNOSIS — Z72.51 UNPROTECTED SEXUAL INTERCOURSE: Primary | ICD-10-CM

## 2025-01-08 DIAGNOSIS — Z11.3 SCREEN FOR STD (SEXUALLY TRANSMITTED DISEASE): ICD-10-CM

## 2025-01-08 DIAGNOSIS — Z30.011 INITIATION OF ORAL CONTRACEPTION: ICD-10-CM

## 2025-01-08 DIAGNOSIS — R30.0 DYSURIA: ICD-10-CM

## 2025-01-08 LAB
ALBUMIN UR-MCNC: NEGATIVE MG/DL
APPEARANCE UR: CLEAR
BACTERIAL VAGINOSIS VAG-IMP: POSITIVE
BILIRUB UR QL STRIP: NEGATIVE
CANDIDA DNA VAG QL NAA+PROBE: NOT DETECTED
CANDIDA GLABRATA / CANDIDA KRUSEI DNA: NOT DETECTED
COLOR UR AUTO: YELLOW
GLUCOSE UR STRIP-MCNC: NEGATIVE MG/DL
HCG UR QL: NEGATIVE
HGB UR QL STRIP: NEGATIVE
INTERNAL QC OK POCT: NORMAL
KETONES UR STRIP-MCNC: NEGATIVE MG/DL
LEUKOCYTE ESTERASE UR QL STRIP: NEGATIVE
NITRATE UR QL: NEGATIVE
PH UR STRIP: 5.5 [PH] (ref 5–8)
POCT KIT EXPIRATION DATE: NORMAL
POCT KIT LOT NUMBER: NORMAL
SP GR UR STRIP: >=1.03 (ref 1–1.03)
T VAGINALIS DNA VAG QL NAA+PROBE: NOT DETECTED
UROBILINOGEN UR STRIP-ACNC: 0.2 E.U./DL

## 2025-01-08 PROCEDURE — 87491 CHLMYD TRACH DNA AMP PROBE: CPT | Performed by: ADVANCED PRACTICE MIDWIFE

## 2025-01-08 PROCEDURE — 99213 OFFICE O/P EST LOW 20 MIN: CPT | Performed by: ADVANCED PRACTICE MIDWIFE

## 2025-01-08 PROCEDURE — 87591 N.GONORRHOEAE DNA AMP PROB: CPT | Performed by: ADVANCED PRACTICE MIDWIFE

## 2025-01-08 PROCEDURE — 81025 URINE PREGNANCY TEST: CPT | Performed by: ADVANCED PRACTICE MIDWIFE

## 2025-01-08 PROCEDURE — 81003 URINALYSIS AUTO W/O SCOPE: CPT | Performed by: ADVANCED PRACTICE MIDWIFE

## 2025-01-08 PROCEDURE — 81515 NFCT DS BV&VAGINITIS DNA ALG: CPT | Performed by: ADVANCED PRACTICE MIDWIFE

## 2025-01-08 RX ORDER — NORGESTIMATE AND ETHINYL ESTRADIOL 0.25-0.035
1 KIT ORAL DAILY
Qty: 90 TABLET | Refills: 3 | Status: SHIPPED | OUTPATIENT
Start: 2025-01-08 | End: 2026-01-08

## 2025-01-08 NOTE — LETTER
"2025       RE: Berta Oseguera  [address] [place] [address]     Dear Colleague,    Thank you for referring your patient, Berta Oseguera, to the [facility] [place] at [facility]. Please see a copy of my visit note below.    Gyn Clinic Visit Note  2025    S: Berta Oseguera is a 19 year old  here today for UTI testing. Also desires STI screening and UPT today. Has some vaginal odor which has improved but not resolved. Did have a little bleeding with sex  but wonders if it was because of just ending her period the day before- it was spotting only that day after sex. No itching, burning, pain with sex. +Urinary frequency and urgency, no dysuria. Interested in starting a birth control method.     Gyn Hx:   Patient's last menstrual period was 2024 (exact date).  Menses:   Every 28 days, lasting 4-5 days. Denies heavy bleeding or large clots with typical periods. -12/10 and then LMP -. This is closer than her typical periods and the second one in December was heavier than her typical and the cramps were severe. The cramps were so severe they made her nauseous.     Had a pregnancy in August, terminated. [place] that her recently bleeding was similar to what she had at the beginning of that pregnancy, which worried her.     Contraception:   None currently, last intercourse - unprotected. Used pills in the past. Did not like the shot due to prolonged bleeding. Amenable to restarting a pill.  Sexual Activity  3 male partners past 6 months  Sexually transmitted disease history:  Chlamydia, Gonorrhea, and Trichomonas in high school, treated.   PAP smear history:  Will schedule her first, declines completing today.    O:   /72   Pulse 76   Ht 1.753 m (5' 9\")   LMP 2024 (Exact Date)   BMI 27.91 kg/m      Pelvic exam: Deferred, pt desires self swabs and " declines pelvic exam.    Reviewed UPT neg and UA WNL other than elevated SG.     A:  Berta Oseguera is a 19 year old y/o  here today for UTI testing. Initiate contraceptive pill, screen sexual infections.     P: Pt will return for pelvic exam with pap when able, RTC sooner prn for problems. GC/CT and multiplex ordered. UC sent. Reviewed significant fam hx breast cancer MGM age 20s. Discussed option to see cancer risk management team. She declines. Her mom is getting her a visit to discuss breast cancer family risk and she will discuss there. Rx sent for Sprintec with refills for the year. Reviewed ACHES warning si/sy and to call if they arise.     Mulu SRIVASTAVA CNM           Again, thank you for allowing me to participate in the care of your patient.      Sincerely,    Mulu Taveras CNM

## 2025-01-08 NOTE — PROGRESS NOTES
"Gyn Clinic Visit Note  2025    S: Berta Oseguera is a 19 year old  here today for UTI testing. Also desires STI screening and UPT today. Has some vaginal odor which has improved but not resolved. Did have a little bleeding with sex  but wonders if it was because of just ending her period the day before- it was spotting only that day after sex. No itching, burning, pain with sex. +Urinary frequency and urgency, no dysuria. Interested in starting a birth control method.     Gyn Hx:   Patient's last menstrual period was 2024 (exact date).  Menses:   Every 28 days, lasting 4-5 days. Denies heavy bleeding or large clots with typical periods. -12/10 and then LMP -. This is closer than her typical periods and the second one in December was heavier than her typical and the cramps were severe. The cramps were so severe they made her nauseous.     Had a pregnancy in August, terminated. [place] that her recently bleeding was similar to what she had at the beginning of that pregnancy, which worried her.     Contraception:   None currently, last intercourse - unprotected. Used pills in the past. Did not like the shot due to prolonged bleeding. Amenable to restarting a pill.  Sexual Activity  3 male partners past 6 months  Sexually transmitted disease history:  Chlamydia, Gonorrhea, and Trichomonas in high school, treated.   PAP smear history:  Will schedule her first, declines completing today.    O:   /72   Pulse 76   Ht 1.753 m (5' 9\")   LMP 2024 (Exact Date)   BMI 27.91 kg/m      Pelvic exam: Deferred, pt desires self swabs and declines pelvic exam.    Reviewed UPT neg and UA WNL other than elevated SG.     A:  Berta Oseguera is a 19 year old y/o  here today for UTI testing. Initiate contraceptive pill, screen sexual infections.     P: Pt will return for pelvic exam with pap when able, RTC sooner prn for problems. GC/CT and multiplex " ordered. UC sent. Reviewed significant fam hx breast cancer MGM age 20s. Discussed option to see cancer risk management team. She declines. Her mom is getting her a visit to discuss breast cancer family risk and she will discuss there. Rx sent for Sprintec with refills for the year. Reviewed ACHES warning si/sy and to call if they arise.     Mulu SRIVASTAVA, RODM

## 2025-01-09 DIAGNOSIS — B96.89 BV (BACTERIAL VAGINOSIS): Primary | ICD-10-CM

## 2025-01-09 DIAGNOSIS — N76.0 BV (BACTERIAL VAGINOSIS): Primary | ICD-10-CM

## 2025-01-09 LAB
C TRACH DNA SPEC QL PROBE+SIG AMP: NEGATIVE
N GONORRHOEA DNA SPEC QL NAA+PROBE: NEGATIVE
SPECIMEN TYPE: NORMAL

## 2025-01-09 RX ORDER — METRONIDAZOLE 500 MG/1
500 TABLET ORAL 2 TIMES DAILY
Qty: 14 TABLET | Refills: 0 | Status: SHIPPED | OUTPATIENT
Start: 2025-01-09 | End: 2025-01-16

## 2025-03-16 ENCOUNTER — HEALTH MAINTENANCE LETTER (OUTPATIENT)
Age: 20
End: 2025-03-16

## 2025-06-16 ENCOUNTER — VIRTUAL VISIT (OUTPATIENT)
Dept: PSYCHOLOGY | Facility: CLINIC | Age: 20
End: 2025-06-16
Payer: COMMERCIAL

## 2025-06-16 DIAGNOSIS — F43.23 ADJUSTMENT DISORDER WITH MIXED ANXIETY AND DEPRESSED MOOD: Primary | ICD-10-CM

## 2025-06-16 PROCEDURE — 90837 PSYTX W PT 60 MINUTES: CPT | Mod: 93 | Performed by: SOCIAL WORKER

## 2025-06-16 ASSESSMENT — ANXIETY QUESTIONNAIRES
GAD7 TOTAL SCORE: 8
4. TROUBLE RELAXING: MORE THAN HALF THE DAYS
3. WORRYING TOO MUCH ABOUT DIFFERENT THINGS: MORE THAN HALF THE DAYS
GAD7 TOTAL SCORE: 8
7. FEELING AFRAID AS IF SOMETHING AWFUL MIGHT HAPPEN: SEVERAL DAYS
GAD7 TOTAL SCORE: 8
2. NOT BEING ABLE TO STOP OR CONTROL WORRYING: SEVERAL DAYS
5. BEING SO RESTLESS THAT IT IS HARD TO SIT STILL: NOT AT ALL
6. BECOMING EASILY ANNOYED OR IRRITABLE: SEVERAL DAYS
8. IF YOU CHECKED OFF ANY PROBLEMS, HOW DIFFICULT HAVE THESE MADE IT FOR YOU TO DO YOUR WORK, TAKE CARE OF THINGS AT HOME, OR GET ALONG WITH OTHER PEOPLE?: SOMEWHAT DIFFICULT
1. FEELING NERVOUS, ANXIOUS, OR ON EDGE: SEVERAL DAYS
7. FEELING AFRAID AS IF SOMETHING AWFUL MIGHT HAPPEN: SEVERAL DAYS
IF YOU CHECKED OFF ANY PROBLEMS ON THIS QUESTIONNAIRE, HOW DIFFICULT HAVE THESE PROBLEMS MADE IT FOR YOU TO DO YOUR WORK, TAKE CARE OF THINGS AT HOME, OR GET ALONG WITH OTHER PEOPLE: SOMEWHAT DIFFICULT

## 2025-06-16 ASSESSMENT — PATIENT HEALTH QUESTIONNAIRE - PHQ9
10. IF YOU CHECKED OFF ANY PROBLEMS, HOW DIFFICULT HAVE THESE PROBLEMS MADE IT FOR YOU TO DO YOUR WORK, TAKE CARE OF THINGS AT HOME, OR GET ALONG WITH OTHER PEOPLE: SOMEWHAT DIFFICULT
SUM OF ALL RESPONSES TO PHQ QUESTIONS 1-9: 12
SUM OF ALL RESPONSES TO PHQ QUESTIONS 1-9: 12

## 2025-06-16 NOTE — PROGRESS NOTES
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 Continued - date(s): 12/20/23, 4/15/24, 7/23/24, 10/23/24, 6/16/25.  Intervention(s): Therapist will assist the patient in identifying and verbalizing needs, met and unmet. Assist the patient in developing a specific action plan to get each need met.    Objective I    Patient will positively acknowledge verbal compliments from others.  Status: New - date: 9/19/23. Continued - date(s): 12/20/23, 4/15/24, 7/23/24, 10/23/24, 6/16/25.  Intervention(s): Therapist will assign patient to be aware of and acknowledge graciously (without discounting) praise and compliments from others.    Objective J  Patient will take verbal responsibility for accomplishments without discounting.  Status: New - date: 9/19/23. Continued - date(s): 12/20/23, 4/15/24, 7/23/24, 10/23/24, 6/16/25.  Intervention(s): Therapist will ask patient list accomplishments; process the integration of these into his/her/their self-image.    Goal 4: Patient will achieve a satisfactory level of balance, structure, and intimacy in personal life.    I will know I've met my goal when I am budgeting, going grocery shopping, have more of a routine, and focus more on my physical health (eat more regularly).       Objective A                  Patient will acknowledge procrastination and the need to reduce it.  Status: New - date: 4/15/24. Continued -  date(s): 7/23/24, 10/23/24, 6/16/25.  Intervention(s): Therapist will assist in identifying positives and negatives of procrastinating toward the goal of engaging the patient in staying focused.      Objective B  Patient will learn and implement skills to reduce procrastination.  Status: New - date: 4/15/24. Continued - date(s): 7/23/24, 10/23/24, 6/16/25.  Intervention(s): Therapist will teach to apply new problem-solving skills to planning as a first step in overcoming procrastination; for each plan, break it down into manageable time-limited steps to reduce the influence of distractibility. Assign homework asking the patient to accomplish identified tasks without procrastination using the techniques learned in therapy); and review and provide corrective feedback toward improving the skill and decreasing procrastination.      Objective C  Patient will learn and implement organization and planning skills.  Status: New - date: 4/15/24. Continued - date(s): 7/23/24, 10/23/24, 6/16/25.  Intervention(s): Therapist will teach organization and planning skills including the routine use of a calendar and daily task list. Develop with the patient a procedure for classifying and managing mail and other papers. Teach problem-solving skills (i.e., identify problem, brainstorm all possible options, evaluate the pros and cons of each option, select best option, implement a course of action, and evaluate results) as an approach to planning; for each plan, break it down into manageable time-limited steps to reduce the influence of distractibility.    Patient has reviewed and agreed to the above plan.     Loree Mendenhall, JONATAN  June 16, 2025

## 2025-06-25 ENCOUNTER — TELEPHONE (OUTPATIENT)
Dept: PSYCHOLOGY | Facility: CLINIC | Age: 20
End: 2025-06-25
Payer: COMMERCIAL

## 2025-07-14 ENCOUNTER — VIRTUAL VISIT (OUTPATIENT)
Dept: PSYCHOLOGY | Facility: CLINIC | Age: 20
End: 2025-07-14
Payer: COMMERCIAL

## 2025-07-14 ENCOUNTER — TELEPHONE (OUTPATIENT)
Dept: PSYCHOLOGY | Facility: CLINIC | Age: 20
End: 2025-07-14
Payer: COMMERCIAL

## 2025-07-14 DIAGNOSIS — F43.23 ADJUSTMENT DISORDER WITH MIXED ANXIETY AND DEPRESSED MOOD: Primary | ICD-10-CM

## 2025-07-14 PROCEDURE — 90837 PSYTX W PT 60 MINUTES: CPT | Mod: 93 | Performed by: SOCIAL WORKER

## 2025-07-14 NOTE — PROGRESS NOTES
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 needs, met and unmet. Assist the patient in developing a specific action plan to get each need met.    Objective I    Patient will positively acknowledge verbal compliments from others.  Status: New - date: 9/19/23. Continued - date(s): 12/20/23, 4/15/24, 7/23/24, 10/23/24, 6/16/25.  Intervention(s): Therapist will assign patient to be aware of and acknowledge graciously (without discounting) praise and compliments from others.    Objective J  Patient will take verbal responsibility for accomplishments without discounting.  Status: New - date: 9/19/23. Continued - date(s): 12/20/23, 4/15/24, 7/23/24, 10/23/24, 6/16/25.  Intervention(s): Therapist will ask patient list accomplishments; process the integration of these into his/her/their self-image.    Goal 4: Patient will achieve a satisfactory level of balance, structure, and intimacy in personal life.    I will know I've met my goal when I am budgeting, going grocery shopping, have more of a routine, and focus more on my physical health (eat more regularly).       Objective A                  Patient will acknowledge procrastination and the need to reduce it.  Status: New - date: 4/15/24. Continued - date(s): 7/23/24, 10/23/24, 6/16/25.  Intervention(s): Therapist will assist in identifying positives and negatives of  procrastinating toward the goal of engaging the patient in staying focused.      Objective B  Patient will learn and implement skills to reduce procrastination.  Status: New - date: 4/15/24. Continued - date(s): 7/23/24, 10/23/24, 6/16/25.  Intervention(s): Therapist will teach to apply new problem-solving skills to planning as a first step in overcoming procrastination; for each plan, break it down into manageable time-limited steps to reduce the influence of distractibility. Assign homework asking the patient to accomplish identified tasks without procrastination using the techniques learned in therapy); and review and provide corrective feedback toward improving the skill and decreasing procrastination.      Objective C  Patient will learn and implement organization and planning skills.  Status: New - date: 4/15/24. Continued - date(s): 7/23/24, 10/23/24, 6/16/25.  Intervention(s): Therapist will teach organization and planning skills including the routine use of a calendar and daily task list. Develop with the patient a procedure for classifying and managing mail and other papers. Teach problem-solving skills (i.e., identify problem, brainstorm all possible options, evaluate the pros and cons of each option, select best option, implement a course of action, and evaluate results) as an approach to planning; for each plan, break it down into manageable time-limited steps to reduce the influence of distractibility.    Patient has reviewed and agreed to the above plan.     JONATAN Knight  June 16, 2025

## 2025-07-17 ENCOUNTER — VIRTUAL VISIT (OUTPATIENT)
Dept: PSYCHOLOGY | Facility: CLINIC | Age: 20
End: 2025-07-17
Payer: COMMERCIAL

## 2025-07-17 DIAGNOSIS — F43.23 ADJUSTMENT DISORDER WITH MIXED ANXIETY AND DEPRESSED MOOD: Primary | ICD-10-CM

## 2025-07-17 NOTE — PROGRESS NOTES
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 9/19/23. Continued - date(s): 12/20/23, 4/15/24, 7/23/24, 10/23/24, 6/16/25.  Intervention(s): Therapist will assist the patient in identifying and verbalizing needs, met and unmet. Assist the patient in developing a specific action plan to get each need met.    Objective I    Patient will positively acknowledge verbal compliments from others.  Status: New - date: 9/19/23. Continued - date(s): 12/20/23, 4/15/24, 7/23/24, 10/23/24, 6/16/25.  Intervention(s): Therapist will assign patient to be aware of and acknowledge graciously (without discounting) praise and compliments from others.    Objective J  Patient will take verbal responsibility for accomplishments without discounting.  Status: New - date: 9/19/23. Continued - date(s): 12/20/23, 4/15/24, 7/23/24, 10/23/24, 6/16/25.  Intervention(s): Therapist will ask patient list accomplishments; process the integration of these into his/her/their self-image.    Goal 4: Patient will achieve a satisfactory level of balance, structure, and intimacy in personal life.    I will know I've met my goal when I am budgeting, going grocery shopping, have more of a routine, and focus more on my physical health (eat more regularly).       Objective A                  Patient will acknowledge procrastination and the need to reduce it.  Status:  New - date: 4/15/24. Continued - date(s): 7/23/24, 10/23/24, 6/16/25.  Intervention(s): Therapist will assist in identifying positives and negatives of procrastinating toward the goal of engaging the patient in staying focused.      Objective B  Patient will learn and implement skills to reduce procrastination.  Status: New - date: 4/15/24. Continued - date(s): 7/23/24, 10/23/24, 6/16/25.  Intervention(s): Therapist will teach to apply new problem-solving skills to planning as a first step in overcoming procrastination; for each plan, break it down into manageable time-limited steps to reduce the influence of distractibility. Assign homework asking the patient to accomplish identified tasks without procrastination using the techniques learned in therapy); and review and provide corrective feedback toward improving the skill and decreasing procrastination.      Objective C  Patient will learn and implement organization and planning skills.  Status: New - date: 4/15/24. Continued - date(s): 7/23/24, 10/23/24, 6/16/25.  Intervention(s): Therapist will teach organization and planning skills including the routine use of a calendar and daily task list. Develop with the patient a procedure for classifying and managing mail and other papers. Teach problem-solving skills (i.e., identify problem, brainstorm all possible options, evaluate the pros and cons of each option, select best option, implement a course of action, and evaluate results) as an approach to planning; for each plan, break it down into manageable time-limited steps to reduce the influence of distractibility.    Patient has reviewed and agreed to the above plan.     JONATAN Knight  June 16, 2025

## 2025-08-07 ENCOUNTER — TELEPHONE (OUTPATIENT)
Dept: PSYCHOLOGY | Facility: CLINIC | Age: 20
End: 2025-08-07
Payer: COMMERCIAL

## 2025-08-11 ENCOUNTER — VIRTUAL VISIT (OUTPATIENT)
Dept: PSYCHOLOGY | Facility: CLINIC | Age: 20
End: 2025-08-11
Payer: COMMERCIAL

## 2025-08-11 DIAGNOSIS — F43.23 ADJUSTMENT DISORDER WITH MIXED ANXIETY AND DEPRESSED MOOD: Primary | ICD-10-CM

## 2025-08-11 PROCEDURE — 90785 PSYTX COMPLEX INTERACTIVE: CPT | Mod: 93 | Performed by: SOCIAL WORKER

## 2025-08-11 PROCEDURE — 90837 PSYTX W PT 60 MINUTES: CPT | Mod: 93 | Performed by: SOCIAL WORKER

## 2025-08-11 ASSESSMENT — ANXIETY QUESTIONNAIRES
GAD7 TOTAL SCORE: 15
6. BECOMING EASILY ANNOYED OR IRRITABLE: MORE THAN HALF THE DAYS
2. NOT BEING ABLE TO STOP OR CONTROL WORRYING: MORE THAN HALF THE DAYS
4. TROUBLE RELAXING: MORE THAN HALF THE DAYS
7. FEELING AFRAID AS IF SOMETHING AWFUL MIGHT HAPPEN: NEARLY EVERY DAY
IF YOU CHECKED OFF ANY PROBLEMS ON THIS QUESTIONNAIRE, HOW DIFFICULT HAVE THESE PROBLEMS MADE IT FOR YOU TO DO YOUR WORK, TAKE CARE OF THINGS AT HOME, OR GET ALONG WITH OTHER PEOPLE: VERY DIFFICULT
5. BEING SO RESTLESS THAT IT IS HARD TO SIT STILL: MORE THAN HALF THE DAYS
8. IF YOU CHECKED OFF ANY PROBLEMS, HOW DIFFICULT HAVE THESE MADE IT FOR YOU TO DO YOUR WORK, TAKE CARE OF THINGS AT HOME, OR GET ALONG WITH OTHER PEOPLE?: VERY DIFFICULT
1. FEELING NERVOUS, ANXIOUS, OR ON EDGE: MORE THAN HALF THE DAYS
3. WORRYING TOO MUCH ABOUT DIFFERENT THINGS: MORE THAN HALF THE DAYS
7. FEELING AFRAID AS IF SOMETHING AWFUL MIGHT HAPPEN: NEARLY EVERY DAY
GAD7 TOTAL SCORE: 15
GAD7 TOTAL SCORE: 15

## 2025-08-11 ASSESSMENT — PATIENT HEALTH QUESTIONNAIRE - PHQ9
10. IF YOU CHECKED OFF ANY PROBLEMS, HOW DIFFICULT HAVE THESE PROBLEMS MADE IT FOR YOU TO DO YOUR WORK, TAKE CARE OF THINGS AT HOME, OR GET ALONG WITH OTHER PEOPLE: VERY DIFFICULT
SUM OF ALL RESPONSES TO PHQ QUESTIONS 1-9: 15
SUM OF ALL RESPONSES TO PHQ QUESTIONS 1-9: 15

## 2025-08-21 ENCOUNTER — VIRTUAL VISIT (OUTPATIENT)
Dept: PSYCHOLOGY | Facility: CLINIC | Age: 20
End: 2025-08-21
Payer: COMMERCIAL

## 2025-08-21 DIAGNOSIS — F43.23 ADJUSTMENT DISORDER WITH MIXED ANXIETY AND DEPRESSED MOOD: Primary | ICD-10-CM

## 2025-08-26 ENCOUNTER — TELEPHONE (OUTPATIENT)
Dept: PSYCHOLOGY | Facility: CLINIC | Age: 20
End: 2025-08-26
Payer: COMMERCIAL